# Patient Record
Sex: MALE | Race: WHITE
[De-identification: names, ages, dates, MRNs, and addresses within clinical notes are randomized per-mention and may not be internally consistent; named-entity substitution may affect disease eponyms.]

---

## 2019-12-30 ENCOUNTER — RX ONLY (RX ONLY)
Age: 44
End: 2019-12-30

## 2019-12-30 ENCOUNTER — OPTICAL OFFICE (OUTPATIENT)
Dept: URBAN - NONMETROPOLITAN AREA CLINIC 5 | Facility: CLINIC | Age: 44
Setting detail: OPHTHALMOLOGY
End: 2019-12-30
Payer: COMMERCIAL

## 2019-12-30 ENCOUNTER — DOCTOR'S OFFICE (OUTPATIENT)
Dept: URBAN - NONMETROPOLITAN AREA CLINIC 2 | Facility: CLINIC | Age: 44
Setting detail: OPHTHALMOLOGY
End: 2019-12-30
Payer: COMMERCIAL

## 2019-12-30 DIAGNOSIS — H52.13: ICD-10-CM

## 2019-12-30 DIAGNOSIS — H50.10: ICD-10-CM

## 2019-12-30 PROCEDURE — V2102 SINGL VISN SPHERE 7.12-20.00: HCPCS | Performed by: OPTOMETRIST

## 2019-12-30 PROCEDURE — V2100 LENS SPHER SINGLE PLANO 4.00: HCPCS | Performed by: OPTOMETRIST

## 2019-12-30 PROCEDURE — V2784 LENS POLYCARB OR EQUAL: HCPCS | Performed by: OPTOMETRIST

## 2019-12-30 PROCEDURE — V2020 VISION SVCS FRAMES PURCHASES: HCPCS | Performed by: OPTOMETRIST

## 2019-12-30 PROCEDURE — 92004 COMPRE OPH EXAM NEW PT 1/>: CPT | Performed by: OPTOMETRIST

## 2019-12-30 PROCEDURE — 92015 DETERMINE REFRACTIVE STATE: CPT | Performed by: OPTOMETRIST

## 2019-12-30 ASSESSMENT — SPHEQUIV_DERIVED
OD_SPHEQUIV: -2.875
OS_SPHEQUIV: -3.875
OD_SPHEQUIV: -3

## 2019-12-30 ASSESSMENT — REFRACTION_MANIFEST
OD_VA1: 20/25
OD_CYLINDER: -0.50
OU_VA: 20/20
OD_VA2: 20/25
OS_SPHERE: -3.00
OD_AXIS: 095
OD_VA3: 20/
OS_VA2: 20/20
OD_SPHERE: -2.75
OS_VA3: 20/
OS_VA1: 20/25

## 2019-12-30 ASSESSMENT — REFRACTION_CURRENTRX
OD_VPRISM_DIRECTION: SV
OD_OVR_VA: 20/
OD_SPHERE: -2.75
OS_VPRISM_DIRECTION: SV
OD_AXIS: 113
OD_CYLINDER: -0.75
OS_OVR_VA: 20/
OS_SPHERE: -3.00
OS_CYLINDER: SPH

## 2019-12-30 ASSESSMENT — CONFRONTATIONAL VISUAL FIELD TEST (CVF)
OD_FINDINGS: FULL
OS_FINDINGS: FULL

## 2019-12-30 ASSESSMENT — AXIALLENGTH_DERIVED
OS_AL: 24.4919
OD_AL: 24.0313
OD_AL: 24.082

## 2019-12-30 ASSESSMENT — REFRACTION_AUTOREFRACTION
OD_CYLINDER: -0.75
OS_CYLINDER: -0.25
OS_SPHERE: -3.75
OS_AXIS: 153
OD_AXIS: 097
OD_SPHERE: -2.50

## 2019-12-30 ASSESSMENT — KERATOMETRY
OS_K2POWER_DIOPTERS: 45.50
OD_AXISANGLE_DEGREES: 073
OD_K2POWER_DIOPTERS: 45.75
OS_K1POWER_DIOPTERS: 45.00
OD_K1POWER_DIOPTERS: 45.00
OS_AXISANGLE_DEGREES: 167

## 2019-12-30 ASSESSMENT — VISUAL ACUITY
OD_BCVA: 20/25
OS_BCVA: 20/25-2

## 2023-03-08 ENCOUNTER — OFFICE VISIT (OUTPATIENT)
Dept: URGENT CARE | Facility: CLINIC | Age: 48
End: 2023-03-08

## 2023-03-08 VITALS
TEMPERATURE: 98.6 F | OXYGEN SATURATION: 98 % | HEART RATE: 85 BPM | RESPIRATION RATE: 20 BRPM | WEIGHT: 192.4 LBS | SYSTOLIC BLOOD PRESSURE: 138 MMHG | DIASTOLIC BLOOD PRESSURE: 75 MMHG

## 2023-03-08 DIAGNOSIS — M79.89 BILATERAL SWELLING OF FEET: Primary | ICD-10-CM

## 2023-03-08 RX ORDER — CEPHALEXIN 500 MG/1
500 CAPSULE ORAL EVERY 8 HOURS SCHEDULED
Qty: 21 CAPSULE | Refills: 0 | Status: SHIPPED | OUTPATIENT
Start: 2023-03-08 | End: 2023-03-15

## 2023-03-08 NOTE — PATIENT INSTRUCTIONS
Take prescribed medications as instructed  Sure to call your family doctor tonight or as early as tomorrow morning to follow-up regarding bilateral swelling of the feet  Compression stockings  Place 1-2 pillows under your feet while resting at home for elevation and to help with swelling  Wear proper footwear  If you experience any chest pain, shortness of breath, or worsening pain in the feet/calves -go to the emergency room  Follow up with PCP in 3-5 days  Proceed to  ER if symptoms worsen  Edema   WHAT YOU NEED TO KNOW:   Edema is swelling throughout your body  Edema is usually a sign that you are retaining fluid  The swelling may be caused by heart failure or kidney, thyroid, or liver disease  It may also be caused by medicines such as antidepressants, blood pressure medicines, or hormones  Sudden swelling around the lips or face may be a sign of a severe allergic reaction  Swelling of an arm or leg may be caused by blockage of your veins  DISCHARGE INSTRUCTIONS:   Return to the emergency department if:   You have shortness of breath at rest, especially when you lie down  You cough up pink, foamy sputum  You have chest pain  Your heartbeat is fast or uneven  Call your doctor if:   The swollen area feels cold and is pale or blue in color  The swollen area feels warm, painful, and is red in color  You have increased swelling or swelling in other parts of your body  You have questions or concerns about your condition or care  Medicines:   Medicines  help to get rid of extra body fluid  Take your medicine as directed  Contact your healthcare provider if you think your medicine is not helping or if you have side effects  Tell your provider if you are allergic to any medicine  Keep a list of the medicines, vitamins, and herbs you take  Include the amounts, and when and why you take them  Bring the list or the pill bottles to follow-up visits   Carry your medicine list with you in case of an emergency  Manage edema:   Elevate  your arms or legs as directed  Raise them above the level of your heart as often as you can  This will help decrease swelling and pain  Prop them on pillows or blankets to keep them elevated comfortably  Wear pressure stockings as directed  The stockings are tight and put pressure on your legs  This helps to keep fluid from collecting in your legs or ankles  Limit your salt intake  Salt causes your body to hold water  Ask about any other changes to your diet  Stay active  Do not stand or sit for long periods of time  Ask your healthcare provider about the best exercise plan for you  Keep your skin moist  using lotion, cream, or ointment  Ask your healthcare provider what to use and how often to use it  Follow up with your doctor as directed:  Write down your questions so you remember to ask them during your visits  © Copyright [employer] 2022 Information is for End User's use only and may not be sold, redistributed or otherwise used for commercial purposes  The above information is an  only  It is not intended as medical advice for individual conditions or treatments  Talk to your doctor, nurse or pharmacist before following any medical regimen to see if it is safe and effective for you

## 2023-03-08 NOTE — PROGRESS NOTES
[facility]        NAME: Nona Galvez is a 50 y o  male  : 1975    MRN: [id number]  DATE: 2023  TIME: 4:46 PM    Assessment and Plan   Bilateral swelling of feet [M79 89]  1  Bilateral swelling of feet  cephalexin (KEFLEX) 500 mg capsule        Patient has bilateral 2+ pitting edema of the feet started 2 days ago  Swelling starts at the high ankles bilaterally and radiates down the feet  Denies any calf pain or weakness in the calf  Palpable pulses bilaterally in the dorsalis pedis and posterior tibialis  There is mild redness of the left lower leg, sending in Keflex  PT has history of Crohn's and gets infusions per his GI doctor  PT admits to pain with bearing weight  Strongly encourage patient to call his family doctor today or as early as tomorrow morning for follow-up regarding swelling in the feet  Given advice for at home remedies  Strongly encourage patient to go to the emergency room if symptoms worsen or do not improve  Patient Instructions     Take prescribed medications as instructed  Sure to call your family doctor tonight or as early as tomorrow morning to follow-up regarding bilateral swelling of the feet  Compression stockings  Place 1-2 pillows under your feet while resting at home for elevation and to help with swelling  Wear proper footwear  If you experience any chest pain, shortness of breath, or worsening pain in the feet/calves -go to the emergency room  Follow up with PCP in 3-5 days  Proceed to  ER if symptoms worsen  Chief Complaint     Chief Complaint   Patient presents with   • feet swelling     With pain  Started yesterday  History of Present Illness       27-year-old male presents with bilateral swelling of the feet that began about 2 days ago  PT states that he has had this in the past once, states it went away on its own  PT has not been taking any medications for pain    Admits to mild pain in the feet bilaterally with walking and bearing weight  He denies any pain or swelling in the calves  PT denies any recent symptoms or illnesses  Denies any fever, chills, chest pain, shortness of breath, abdominal pain, nausea, vomiting, diarrhea  Denies any recent injuries to the lower extremities  Review of Systems   Review of Systems   Constitutional: Negative  HENT: Negative  Respiratory: Negative  Cardiovascular: Negative  Gastrointestinal: Negative  Musculoskeletal: Positive for arthralgias and joint swelling  Skin: Positive for color change  Neurological: Negative  Current Medications       Current Outpatient Medications:   •  cephalexin (KEFLEX) 500 mg capsule, Take 1 capsule (500 mg total) by mouth every 8 (eight) hours for 7 days, Disp: 21 capsule, Rfl: 0    Current Allergies     Allergies as of 03/08/2023 - never reviewed   Allergen Reaction Noted   • Tetanus-diphth-acell pertussis Other (See Comments) 03/08/2023            The following portions of the patient's history were reviewed and updated as appropriate: allergies, current medications, past family history, past medical history, past social history, past surgical history and problem list      Past Medical History:   Diagnosis Date   • Crohn's disease ([facility] [[email or web address] [facility] )        No past surgical history on file  No family history on file  Medications have been verified  Objective   /75   Pulse 85   Temp 98 6 °F (37 °C)   Resp 20   Wt 87 3 kg (192 lb 6 4 oz)   SpO2 98%        Physical Exam     Physical Exam  Constitutional:       General: He is not in acute distress  Appearance: Normal appearance  HENT:      Head: Normocephalic  Eyes:      Extraocular Movements: Extraocular movements intact  Pupils: Pupils are equal, round, and reactive to light  Cardiovascular:      Rate and Rhythm: Normal rate and regular rhythm  Pulses: Normal pulses  Heart sounds: Normal heart sounds  No murmur heard      No friction rub  No gallop  Pulmonary:      Effort: Pulmonary effort is normal  No respiratory distress  Breath sounds: Normal breath sounds  No stridor  No wheezing, rhonchi or rales  Chest:      Chest wall: No tenderness  Abdominal:      General: Abdomen is flat  Bowel sounds are normal       Palpations: Abdomen is soft  Musculoskeletal:      Right ankle: Swelling present  No deformity  Normal pulse  Right Achilles Tendon: Normal       Left ankle: Swelling present  No deformity  Normal pulse  Left Achilles Tendon: Normal       Right foot: Normal capillary refill  Swelling and tenderness (mild) present  Normal pulse  Left foot: Normal capillary refill  Swelling and tenderness (mild) present  Normal pulse  Comments: There is bilateral 2+ pitting edema from the high ankle level bilaterally extending down into the feet  There is very mild erythema of the left medial high ankle region  No open wounds or drainage  Equal sensation bilaterally  Good range of motion with plantar/dorsiflexion/inversion/eversion  Dorsalis pedis and posterior tibialis pulses palpable  Equal sensation bilaterally  Skin:     General: Skin is warm and dry  Capillary Refill: Capillary refill takes less than 2 seconds  Neurological:      General: No focal deficit present  Mental Status: He is alert and oriented to person, place, and time     Psychiatric:         Mood and Affect: Mood normal          Behavior: Behavior normal

## 2023-12-05 ENCOUNTER — HOSPITAL ENCOUNTER (INPATIENT)
Facility: HOSPITAL | Age: 48
LOS: 5 days | Discharge: HOME | DRG: 245 | End: 2023-12-10
Attending: EMERGENCY MEDICINE | Admitting: FAMILY MEDICINE
Payer: COMMERCIAL

## 2023-12-05 ENCOUNTER — APPOINTMENT (EMERGENCY)
Dept: RADIOLOGY | Facility: HOSPITAL | Age: 48
DRG: 245 | End: 2023-12-05
Payer: COMMERCIAL

## 2023-12-05 ENCOUNTER — APPOINTMENT (EMERGENCY)
Dept: CT IMAGING | Facility: HOSPITAL | Age: 48
DRG: 245 | End: 2023-12-05
Payer: COMMERCIAL

## 2023-12-05 ENCOUNTER — TELEPHONE (OUTPATIENT)
Dept: OTHER | Facility: HOSPITAL | Age: 48
End: 2023-12-05

## 2023-12-05 ENCOUNTER — APPOINTMENT (INPATIENT)
Dept: RADIOLOGY | Facility: HOSPITAL | Age: 48
DRG: 245 | End: 2023-12-05
Payer: COMMERCIAL

## 2023-12-05 DIAGNOSIS — N28.89 RIGHT KIDNEY MASS: ICD-10-CM

## 2023-12-05 DIAGNOSIS — I10 ESSENTIAL (PRIMARY) HYPERTENSION: ICD-10-CM

## 2023-12-05 DIAGNOSIS — K56.609 SBO (SMALL BOWEL OBSTRUCTION) (HCC): Primary | ICD-10-CM

## 2023-12-05 DIAGNOSIS — K50.118 CROHN'S COLITIS, OTHER COMPLICATION (HCC): ICD-10-CM

## 2023-12-05 LAB
ALBUMIN SERPL BCP-MCNC: 4.5 G/DL (ref 3.5–5)
ALP SERPL-CCNC: 74 U/L (ref 34–104)
ALT SERPL W P-5'-P-CCNC: 28 U/L (ref 7–52)
ANION GAP SERPL CALCULATED.3IONS-SCNC: 11 MMOL/L
AST SERPL W P-5'-P-CCNC: 12 U/L (ref 13–39)
BASOPHILS # BLD AUTO: 0.09 THOUSANDS/ÂΜL (ref 0–0.1)
BASOPHILS NFR BLD AUTO: 1 % (ref 0–1)
BILIRUB SERPL-MCNC: 0.82 MG/DL (ref 0.2–1)
BUN SERPL-MCNC: 19 MG/DL (ref 5–25)
CALCIUM SERPL-MCNC: 9.8 MG/DL (ref 8.4–10.2)
CHLORIDE SERPL-SCNC: 99 MMOL/L (ref 96–108)
CO2 SERPL-SCNC: 28 MMOL/L (ref 21–32)
CREAT SERPL-MCNC: 1.01 MG/DL (ref 0.6–1.3)
CRP SERPL QL: 2.4 MG/L
EOSINOPHIL # BLD AUTO: 0.03 THOUSAND/ÂΜL (ref 0–0.61)
EOSINOPHIL NFR BLD AUTO: 0 % (ref 0–6)
ERYTHROCYTE [DISTWIDTH] IN BLOOD BY AUTOMATED COUNT: 15.9 % (ref 11.6–15.1)
ERYTHROCYTE [SEDIMENTATION RATE] IN BLOOD: <1 MM/HOUR (ref 0–14)
GFR SERPL CREATININE-BSD FRML MDRD: 87 ML/MIN/1.73SQ M
GLUCOSE SERPL-MCNC: 102 MG/DL (ref 65–140)
HCT VFR BLD AUTO: 47.9 % (ref 36.5–49.3)
HGB BLD-MCNC: 15.6 G/DL (ref 12–17)
IMM GRANULOCYTES # BLD AUTO: 0.37 THOUSAND/UL (ref 0–0.2)
IMM GRANULOCYTES NFR BLD AUTO: 2 % (ref 0–2)
LACTATE SERPL-SCNC: 1.6 MMOL/L (ref 0.5–2)
LACTATE SERPL-SCNC: 2.3 MMOL/L (ref 0.5–2)
LIPASE SERPL-CCNC: 13 U/L (ref 11–82)
LYMPHOCYTES # BLD AUTO: 1.29 THOUSANDS/ÂΜL (ref 0.6–4.47)
LYMPHOCYTES NFR BLD AUTO: 8 % (ref 14–44)
MCH RBC QN AUTO: 26.8 PG (ref 26.8–34.3)
MCHC RBC AUTO-ENTMCNC: 32.6 G/DL (ref 31.4–37.4)
MCV RBC AUTO: 82 FL (ref 82–98)
MONOCYTES # BLD AUTO: 0.84 THOUSAND/ÂΜL (ref 0.17–1.22)
MONOCYTES NFR BLD AUTO: 6 % (ref 4–12)
NEUTROPHILS # BLD AUTO: 12.72 THOUSANDS/ÂΜL (ref 1.85–7.62)
NEUTS SEG NFR BLD AUTO: 83 % (ref 43–75)
NRBC BLD AUTO-RTO: 0 /100 WBCS
PLATELET # BLD AUTO: 363 THOUSANDS/UL (ref 149–390)
PMV BLD AUTO: 8.9 FL (ref 8.9–12.7)
POTASSIUM SERPL-SCNC: 3.4 MMOL/L (ref 3.5–5.3)
PROT SERPL-MCNC: 6.4 G/DL (ref 6.4–8.4)
RBC # BLD AUTO: 5.82 MILLION/UL (ref 3.88–5.62)
SODIUM SERPL-SCNC: 138 MMOL/L (ref 135–147)
WBC # BLD AUTO: 15.34 THOUSAND/UL (ref 4.31–10.16)

## 2023-12-05 PROCEDURE — 85652 RBC SED RATE AUTOMATED: CPT | Performed by: EMERGENCY MEDICINE

## 2023-12-05 PROCEDURE — 96365 THER/PROPH/DIAG IV INF INIT: CPT

## 2023-12-05 PROCEDURE — 99222 1ST HOSP IP/OBS MODERATE 55: CPT | Performed by: UROLOGY

## 2023-12-05 PROCEDURE — 99284 EMERGENCY DEPT VISIT MOD MDM: CPT

## 2023-12-05 PROCEDURE — 83605 ASSAY OF LACTIC ACID: CPT | Performed by: EMERGENCY MEDICINE

## 2023-12-05 PROCEDURE — 85025 COMPLETE CBC W/AUTO DIFF WBC: CPT | Performed by: EMERGENCY MEDICINE

## 2023-12-05 PROCEDURE — 71045 X-RAY EXAM CHEST 1 VIEW: CPT

## 2023-12-05 PROCEDURE — G1004 CDSM NDSC: HCPCS

## 2023-12-05 PROCEDURE — 96376 TX/PRO/DX INJ SAME DRUG ADON: CPT

## 2023-12-05 PROCEDURE — 96361 HYDRATE IV INFUSION ADD-ON: CPT

## 2023-12-05 PROCEDURE — 86140 C-REACTIVE PROTEIN: CPT | Performed by: EMERGENCY MEDICINE

## 2023-12-05 PROCEDURE — 80053 COMPREHEN METABOLIC PANEL: CPT | Performed by: EMERGENCY MEDICINE

## 2023-12-05 PROCEDURE — 96366 THER/PROPH/DIAG IV INF ADDON: CPT

## 2023-12-05 PROCEDURE — 83605 ASSAY OF LACTIC ACID: CPT | Performed by: FAMILY MEDICINE

## 2023-12-05 PROCEDURE — 96375 TX/PRO/DX INJ NEW DRUG ADDON: CPT

## 2023-12-05 PROCEDURE — 36415 COLL VENOUS BLD VENIPUNCTURE: CPT | Performed by: EMERGENCY MEDICINE

## 2023-12-05 PROCEDURE — 99223 1ST HOSP IP/OBS HIGH 75: CPT | Performed by: SURGERY

## 2023-12-05 PROCEDURE — C9113 INJ PANTOPRAZOLE SODIUM, VIA: HCPCS | Performed by: PHYSICIAN ASSISTANT

## 2023-12-05 PROCEDURE — 83690 ASSAY OF LIPASE: CPT | Performed by: EMERGENCY MEDICINE

## 2023-12-05 PROCEDURE — 74177 CT ABD & PELVIS W/CONTRAST: CPT

## 2023-12-05 PROCEDURE — 99223 1ST HOSP IP/OBS HIGH 75: CPT | Performed by: FAMILY MEDICINE

## 2023-12-05 PROCEDURE — 99285 EMERGENCY DEPT VISIT HI MDM: CPT | Performed by: EMERGENCY MEDICINE

## 2023-12-05 RX ORDER — SODIUM CHLORIDE, SODIUM GLUCONATE, SODIUM ACETATE, POTASSIUM CHLORIDE, MAGNESIUM CHLORIDE, SODIUM PHOSPHATE, DIBASIC, AND POTASSIUM PHOSPHATE .53; .5; .37; .037; .03; .012; .00082 G/100ML; G/100ML; G/100ML; G/100ML; G/100ML; G/100ML; G/100ML
100 INJECTION, SOLUTION INTRAVENOUS CONTINUOUS
Status: DISCONTINUED | OUTPATIENT
Start: 2023-12-05 | End: 2023-12-08

## 2023-12-05 RX ORDER — ONDANSETRON 2 MG/ML
4 INJECTION INTRAMUSCULAR; INTRAVENOUS EVERY 6 HOURS PRN
Status: DISCONTINUED | OUTPATIENT
Start: 2023-12-05 | End: 2023-12-10 | Stop reason: HOSPADM

## 2023-12-05 RX ORDER — HYDROMORPHONE HCL/PF 1 MG/ML
0.5 SYRINGE (ML) INJECTION ONCE
Status: COMPLETED | OUTPATIENT
Start: 2023-12-05 | End: 2023-12-05

## 2023-12-05 RX ORDER — OMEPRAZOLE 40 MG/1
40 CAPSULE, DELAYED RELEASE ORAL DAILY
COMMUNITY
Start: 2023-11-29

## 2023-12-05 RX ORDER — SULFAMETHOXAZOLE AND TRIMETHOPRIM 400; 80 MG/1; MG/1
1 TABLET ORAL DAILY
COMMUNITY
Start: 2023-11-28 | End: 2023-12-10

## 2023-12-05 RX ORDER — UPADACITINIB 45 MG/1
45 TABLET, EXTENDED RELEASE ORAL DAILY
Status: ON HOLD | COMMUNITY
Start: 2023-10-04 | End: 2023-12-10 | Stop reason: SDUPTHER

## 2023-12-05 RX ORDER — POTASSIUM CHLORIDE 14.9 MG/ML
20 INJECTION INTRAVENOUS ONCE
Status: COMPLETED | OUTPATIENT
Start: 2023-12-05 | End: 2023-12-05

## 2023-12-05 RX ORDER — ONDANSETRON 2 MG/ML
4 INJECTION INTRAMUSCULAR; INTRAVENOUS ONCE
Status: COMPLETED | OUTPATIENT
Start: 2023-12-05 | End: 2023-12-05

## 2023-12-05 RX ORDER — MORPHINE SULFATE 4 MG/ML
4 INJECTION, SOLUTION INTRAMUSCULAR; INTRAVENOUS EVERY 4 HOURS PRN
Status: DISCONTINUED | OUTPATIENT
Start: 2023-12-05 | End: 2023-12-09

## 2023-12-05 RX ORDER — AMLODIPINE BESYLATE 2.5 MG/1
2.5 TABLET ORAL DAILY
COMMUNITY
Start: 2023-09-14

## 2023-12-05 RX ORDER — HYDROMORPHONE HCL/PF 1 MG/ML
0.5 SYRINGE (ML) INJECTION
Status: DISCONTINUED | OUTPATIENT
Start: 2023-12-05 | End: 2023-12-10 | Stop reason: HOSPADM

## 2023-12-05 RX ORDER — PANTOPRAZOLE SODIUM 40 MG/10ML
40 INJECTION, POWDER, LYOPHILIZED, FOR SOLUTION INTRAVENOUS
Status: DISCONTINUED | OUTPATIENT
Start: 2023-12-05 | End: 2023-12-10 | Stop reason: HOSPADM

## 2023-12-05 RX ORDER — FLUTICASONE PROPIONATE 50 MCG
1 SPRAY, SUSPENSION (ML) NASAL DAILY
COMMUNITY
Start: 2023-09-14 | End: 2023-12-10

## 2023-12-05 RX ORDER — PREDNISONE 10 MG/1
TABLET ORAL DAILY
Status: ON HOLD | COMMUNITY
Start: 2023-11-28 | End: 2023-12-10 | Stop reason: SDUPTHER

## 2023-12-05 RX ORDER — HYDROMORPHONE HCL/PF 1 MG/ML
1 SYRINGE (ML) INJECTION ONCE
Status: COMPLETED | OUTPATIENT
Start: 2023-12-05 | End: 2023-12-05

## 2023-12-05 RX ORDER — LABETALOL HYDROCHLORIDE 5 MG/ML
10 INJECTION, SOLUTION INTRAVENOUS EVERY 4 HOURS PRN
Status: DISCONTINUED | OUTPATIENT
Start: 2023-12-05 | End: 2023-12-10 | Stop reason: HOSPADM

## 2023-12-05 RX ORDER — ENOXAPARIN SODIUM 100 MG/ML
40 INJECTION SUBCUTANEOUS DAILY
Status: DISCONTINUED | OUTPATIENT
Start: 2023-12-05 | End: 2023-12-10 | Stop reason: HOSPADM

## 2023-12-05 RX ADMIN — MORPHINE SULFATE 4 MG: 4 INJECTION, SOLUTION INTRAMUSCULAR; INTRAVENOUS at 15:38

## 2023-12-05 RX ADMIN — ONDANSETRON 4 MG: 2 INJECTION INTRAMUSCULAR; INTRAVENOUS at 06:17

## 2023-12-05 RX ADMIN — SODIUM CHLORIDE, SODIUM GLUCONATE, SODIUM ACETATE, POTASSIUM CHLORIDE, MAGNESIUM CHLORIDE, SODIUM PHOSPHATE, DIBASIC, AND POTASSIUM PHOSPHATE 100 ML/HR: .53; .5; .37; .037; .03; .012; .00082 INJECTION, SOLUTION INTRAVENOUS at 13:02

## 2023-12-05 RX ADMIN — ENOXAPARIN SODIUM 40 MG: 40 INJECTION SUBCUTANEOUS at 12:54

## 2023-12-05 RX ADMIN — LABETALOL HYDROCHLORIDE 10 MG: 5 INJECTION, SOLUTION INTRAVENOUS at 12:56

## 2023-12-05 RX ADMIN — POTASSIUM CHLORIDE 20 MEQ: 14.9 INJECTION, SOLUTION INTRAVENOUS at 07:17

## 2023-12-05 RX ADMIN — IOHEXOL 100 ML: 350 INJECTION, SOLUTION INTRAVENOUS at 08:15

## 2023-12-05 RX ADMIN — HYDROMORPHONE HYDROCHLORIDE 0.5 MG: 1 INJECTION, SOLUTION INTRAMUSCULAR; INTRAVENOUS; SUBCUTANEOUS at 20:07

## 2023-12-05 RX ADMIN — HYDROMORPHONE HYDROCHLORIDE 0.5 MG: 1 INJECTION, SOLUTION INTRAMUSCULAR; INTRAVENOUS; SUBCUTANEOUS at 06:18

## 2023-12-05 RX ADMIN — HYDROMORPHONE HYDROCHLORIDE 1 MG: 1 INJECTION, SOLUTION INTRAMUSCULAR; INTRAVENOUS; SUBCUTANEOUS at 09:07

## 2023-12-05 RX ADMIN — PANTOPRAZOLE SODIUM 40 MG: 40 INJECTION, POWDER, LYOPHILIZED, FOR SOLUTION INTRAVENOUS at 17:15

## 2023-12-05 RX ADMIN — SODIUM CHLORIDE 1000 ML: 0.9 INJECTION, SOLUTION INTRAVENOUS at 06:16

## 2023-12-05 NOTE — ASSESSMENT & PLAN NOTE
CT abdomen and pelvis 12/05/2023: Heterogeneously enhancing mass within the right kidney most concerning for renal cell carcinoma.    Urology consult appreciated

## 2023-12-05 NOTE — ASSESSMENT & PLAN NOTE
PMH of Crohn's disease, complicated by fibrostenotic region, s/p R hemicolectomy w anastomosis 2012,   H/o recent admission for Crohn's flare,   - CT scan 11/22/2023: possible SBO, enteritis, colitis. - Colonoscopy 11/24/2023: ulcer in colon, regions of severe colitis. - Was discharged on prednisone taper, Rinvoq 45 mg daily, states he is compliant.     GI consult appreciated

## 2023-12-05 NOTE — PLAN OF CARE
Problem: PAIN - ADULT  Goal: Verbalizes/displays adequate comfort level or baseline comfort level  Description: Interventions:  - Encourage patient to monitor pain and request assistance  - Assess pain using appropriate pain scale  - Administer analgesics based on type and severity of pain and evaluate response  - Implement non-pharmacological measures as appropriate and evaluate response  - Consider cultural and social influences on pain and pain management  - Notify physician/advanced practitioner if interventions unsuccessful or patient reports new pain  Outcome: Progressing     Problem: INFECTION - ADULT  Goal: Absence or prevention of progression during hospitalization  Description: INTERVENTIONS:  - Assess and monitor for signs and symptoms of infection  - Monitor lab/diagnostic results  - Monitor all insertion sites, i.e. indwelling lines, tubes, and drains  - Monitor endotracheal if appropriate and nasal secretions for changes in amount and color  - [place] appropriate cooling/warming therapies per order  - Administer medications as ordered  - Instruct and encourage patient and family to use good hand hygiene technique  - Identify and instruct in appropriate isolation precautions for identified infection/condition  Outcome: Progressing  Goal: Absence of fever/infection during neutropenic period  Description: INTERVENTIONS:  - Monitor WBC    Outcome: Progressing     Problem: SAFETY ADULT  Goal: Patient will remain free of falls  Description: INTERVENTIONS:  - Educate patient/family on patient safety including physical limitations  - Instruct patient to call for assistance with activity   - Consult OT/PT to assist with strengthening/mobility   - Keep Call bell within reach  - Keep bed low and locked with side rails adjusted as appropriate  - Keep care items and personal belongings within reach  - Initiate and maintain comfort rounds  - Make Fall Risk Sign visible to staff  - Offer Toileting every 2 Hours, in advance of need  - Initiate/Maintain fall alarm  - Obtain necessary fall risk management equipment: non-skid footwear  - Apply yellow socks and bracelet for high fall risk patients  - Consider moving patient to room near nurses station  Outcome: Progressing  Goal: Maintain or return to baseline ADL function  Description: INTERVENTIONS:  -  Assess patient's ability to carry out ADLs; assess patient's baseline for ADL function and identify physical deficits which impact ability to perform ADLs (bathing, care of mouth/teeth, toileting, grooming, dressing, etc.)  - Assess/evaluate cause of self-care deficits   - Assess range of motion  - Assess patient's mobility; develop plan if impaired  - Assess patient's need for assistive devices and provide as appropriate  - Encourage maximum independence but intervene and supervise when necessary  - Involve family in performance of ADLs  - Assess for home care needs following discharge   - Consider OT consult to assist with ADL evaluation and planning for discharge  - Provide patient education as appropriate  Outcome: Progressing  Goal: Maintains/Returns to pre admission functional level  Description: INTERVENTIONS:  - Perform AM-PAC 6 Click Basic Mobility/ Daily Activity assessment daily.  - Set and communicate daily mobility goal to care team and patient/family/caregiver. - Collaborate with rehabilitation services on mobility goals if consulted  - Perform Range of Motion 3 times a day. - Reposition patient every 2 hours.   - Dangle patient 3 times a day  - Stand patient 3 times a day  - Ambulate patient 3 times a day  - Out of bed to chair 3 times a day   - Out of bed for meals 3 times a day  - Out of bed for toileting  - Record patient progress and toleration of activity level   Outcome: Progressing     Problem: DISCHARGE PLANNING  Goal: Discharge to home or other facility with appropriate resources  Description: INTERVENTIONS:  - Identify barriers to discharge w/patient and caregiver  - Arrange for needed discharge resources and transportation as appropriate  - Identify discharge learning needs (meds, wound care, etc.)  - Arrange for interpretive services to assist at discharge as needed  - Refer to Case Management Department for coordinating discharge planning if the patient needs post-hospital services based on physician/advanced practitioner order or complex needs related to functional status, cognitive ability, or social support system  Outcome: Progressing     Problem: Knowledge Deficit  Goal: Patient/family/caregiver demonstrates understanding of disease process, treatment plan, medications, and discharge instructions  Description: Complete learning assessment and assess knowledge base.   Interventions:  - Provide teaching at level of understanding  - Provide teaching via preferred learning methods  Outcome: Progressing     Problem: GASTROINTESTINAL - ADULT  Goal: Minimal or absence of nausea and/or vomiting  Description: INTERVENTIONS:  - Administer IV fluids if ordered to ensure adequate hydration  - Maintain NPO status until nausea and vomiting are resolved  - Nasogastric tube if ordered  - Administer ordered antiemetic medications as needed  - Provide nonpharmacologic comfort measures as appropriate  - Advance diet as tolerated, if ordered  - Consider nutrition services referral to assist patient with adequate nutrition and appropriate food choices  Outcome: Progressing  Goal: Maintains or returns to baseline bowel function  Description: INTERVENTIONS:  - Assess bowel function  - Encourage oral fluids to ensure adequate hydration  - Administer IV fluids if ordered to ensure adequate hydration  - Administer ordered medications as needed  - Encourage mobilization and activity  - Consider nutritional services referral to assist patient with adequate nutrition and appropriate food choices  Outcome: Progressing  Goal: Maintains adequate nutritional intake  Description: INTERVENTIONS:  - Monitor percentage of each meal consumed  - Identify factors contributing to decreased intake, treat as appropriate  - Assist with meals as needed  - Monitor I&O, weight, and lab values if indicated  - Obtain nutrition services referral as needed  Outcome: Progressing  Goal: Oral mucous membranes remain intact  Description: INTERVENTIONS  - Assess oral mucosa and hygiene practices  - Implement preventative oral hygiene regimen  - Implement oral medicated treatments as ordered  - Initiate Nutrition services referral as needed  Outcome: Progressing     Problem: GENITOURINARY - ADULT  Goal: Maintains or returns to baseline urinary function  Description: INTERVENTIONS:  - Assess urinary function  - Encourage oral fluids to ensure adequate hydration if ordered  - Administer IV fluids as ordered to ensure adequate hydration  - Administer ordered medications as needed  - Offer frequent toileting  - Follow urinary retention protocol if ordered  Outcome: Progressing  Goal: Absence of urinary retention  Description: INTERVENTIONS:  - Assess patient’s ability to void and empty bladder  - Monitor I/O  - Bladder scan as needed  - Discuss with physician/AP medications to alleviate retention as needed  - Discuss catheterization for long term situations as appropriate  Outcome: Progressing     Problem: METABOLIC, FLUID AND ELECTROLYTES - ADULT  Goal: Electrolytes maintained within normal limits  Description: INTERVENTIONS:  - Monitor labs and assess patient for signs and symptoms of electrolyte imbalances  - Administer electrolyte replacement as ordered  - Monitor response to electrolyte replacements, including repeat lab results as appropriate  - Instruct patient on fluid and nutrition as appropriate  Outcome: Progressing  Goal: Fluid balance maintained  Description: INTERVENTIONS:  - Monitor labs   - Monitor I/O and WT  - Instruct patient on fluid and nutrition as appropriate  - Assess for signs & symptoms of volume excess or deficit  Outcome: Progressing  Goal: Glucose maintained within target range  Description: INTERVENTIONS:  - Monitor Blood Glucose as ordered  - Assess for signs and symptoms of hyperglycemia and hypoglycemia  - Administer ordered medications to maintain glucose within target range  - Assess nutritional intake and initiate nutrition service referral as needed  Outcome: Progressing     Problem: HEMATOLOGIC - ADULT  Goal: Maintains hematologic stability  Description: INTERVENTIONS  - Assess for signs and symptoms of bleeding or hemorrhage  - Monitor labs  - Administer supportive blood products/factors as ordered and appropriate  Outcome: Progressing     Problem: MUSCULOSKELETAL - ADULT  Goal: Maintain or return mobility to safest level of function  Description: INTERVENTIONS:  - Assess patient's ability to carry out ADLs; assess patient's baseline for ADL function and identify physical deficits which impact ability to perform ADLs (bathing, care of mouth/teeth, toileting, grooming, dressing, etc.)  - Assess/evaluate cause of self-care deficits   - Assess range of motion  - Assess patient's mobility  - Assess patient's need for assistive devices and provide as appropriate  - Encourage maximum independence but intervene and supervise when necessary  - Involve family in performance of ADLs  - Assess for home care needs following discharge   - Consider OT consult to assist with ADL evaluation and planning for discharge  - Provide patient education as appropriate  Outcome: Progressing  Goal: Maintain proper alignment of affected body part  Description: INTERVENTIONS:  - Support, maintain and protect limb and body alignment  - Provide patient/ family with appropriate education  Outcome: Progressing

## 2023-12-05 NOTE — CONSULTS
Consultation - Urology  Mima Edwards 50 y.o. male MRN: [id number]  Unit/Bed#: RM02 Encounter: [id number]    ASSESSMENT  49 y/o M w/ PMH  hypertension, Crohn's disease (s/p 2 bowel resections), and vitamin D deficiency who presents with abdominal pain and nausea/vomiting. CTAP revealing SBO. Urology was consulted for evaluation of right kidney mass. Right kidney mass  -Incidental finding of a 2.8 cm heterogeneously enhancing right kidney mass suspicious for renal cell carcinoma on CT AP with contrast.    -Patient reports known history of this. Says he had follow-up renal ultrasounds to monitor. He said previously it was read as a benign cyst.  He said he was aware that the read had changed but had not not followed up urology.  -Per chart review was last seen by urology for a telemedicine visit in May 2020 by Sydney Plascencia MD of [facility]. At that time he was being followed for 2 lesions 1 of 2.2 cm and 1 of 1.9 cm in the right kidney which has been stable for many years. Patient opted for active surveillance with repeat imaging he was told to repeat imaging in 2 years and return for follow-up however patient was lost to follow-up.  -Per chart this was found on imaging as early as 2016  -Denies any urological symptoms, no irritative or obstructive voiding symptoms or hematuria. Denies back pain. Endorses abdominal pain however likely related to his current presentation of small bowel obstruction and Crohn's flare. Denies unintentional weight loss. -Kidney function is stable, no INES  -No signs of metastases on CT imaging    PLAN:  -Discussed with on-call urology AP Stepan Cisneros. Dr. Gwendolyn Terry is the on-call urologist.  -No urgent management is necessary, but he should have close outpatient follow up for discussion of surgical management options.        SUBJECTIVE:  Chief Complaint:    HPI: Mima Edwards is a 50y.o. year old male with PMH hypertension, Crohn's disease, and vitamin D deficiency who presents with abdominal pain, nausea, vomiting. Upon work-up in the ED was noted to have signs and symptoms and diagnostic findings correlating with small bowel obstruction. Incidental finding on CT scan revealed a 2.8 cm right kidney mass suspicious for renal cell carcinoma. Patient denies any urological symptoms. No issues voiding. No hematuria. Denies back pain. Denies weight loss. Has abdominal pain and nausea/vomiting likely related to his SBO. Review of Systems:  Review of Systems   Constitutional:  Negative for appetite change, chills, fever and unexpected weight change. HENT: Negative. Eyes: Negative. Respiratory: Negative. Gastrointestinal:  Positive for abdominal distention, abdominal pain, nausea and vomiting. Genitourinary:  Negative for decreased urine volume, difficulty urinating, dysuria, flank pain, frequency, hematuria and urgency. Musculoskeletal: Negative. Skin: Negative. Neurological: Negative. Hematological: Negative. Psychiatric/Behavioral: Negative. Previous History:  Historical Information   Past Medical History:   Diagnosis Date    Crohn's disease [facility])      Past Surgical History:   Procedure Laterality Date    COLECTOMY      right hemicolectomy? 2012     Social History   Social History     Substance and Sexual Activity   Alcohol Use Never     Social History     Substance and Sexual Activity   Drug Use Never     Social History     Tobacco Use   Smoking Status Never   Smokeless Tobacco Never     Family History: non-contributory    Meds/Allergies   Home meds:   Prior to Admission medications    Medication Sig Start Date End Date Taking?  Authorizing Provider   amLODIPine (NORVASC) 2.5 mg tablet Take 2.5 mg by mouth daily 9/14/23  Yes Historical Provider, MD   fluticasone (FLONASE) 50 mcg/act nasal spray 1 spray into each nostril daily 9/14/23  Yes Historical Provider, MD   omeprazole (PriLOSEC) 40 MG capsule Take 40 mg by mouth daily 11/29/23 Yes Historical Provider, MD   predniSONE 10 mg tablet Take by mouth daily 11/28/23 1/23/24 Yes Historical Provider, MD   sulfamethoxazole-trimethoprim (BACTRIM) 400-80 mg per tablet Take 1 tablet by mouth daily 11/28/23 1/23/24 Yes Historical Provider, MD   Upadacitinib ER (Rinvoq) 45 MG TB24 Take 45 mg by mouth daily 10/4/23  Yes Historical Provider, MD     Allergies: Allergies   Allergen Reactions    Tetanus-Diphth-Acell Pertussis Other (See Comments)     Swelling, fever and redness at injection site           OBJECTIVE:   Vitals: Blood pressure (!) 178/106, pulse 60, temperature 97.8 °F (36.6 °C), temperature source Temporal, resp. rate 17, height 6' 1" (1.854 m), weight 86.2 kg (190 lb), SpO2 95 %. Body mass index is 25.07 kg/m².     I/Os:  I/O         12/03 0701 12/04 0700 12/04 0701 12/05 0700 12/05 0701 12/06 0700    Emesis/NG output   325    Total Output   325    Net   -325                   Lines/Tubes:  Invasive Devices       Peripheral Intravenous Line  Duration             Peripheral IV 12/05/23 Right Antecubital <1 day              Drain  Duration             NG/OG/Enteral Tube Nasogastric 14 Fr Right nare <1 day                    Current Inpatient Medications:  Scheduled Meds:  Current Facility-Administered Medications   Medication Dose Route Frequency Provider Last Rate    enoxaparin  40 mg Subcutaneous Daily Galilea Pope MD      HYDROmorphone  0.5 mg Intravenous Q3H PRN Toño John MD      iohexol  50 mL Oral Once in imaging Toño John MD      labetalol  10 mg Intravenous Q4H PRN Toño John MD      morphine injection  2 mg Intravenous Q4H PRN Galilea Pope MD      Or    morphine injection  4 mg Intravenous Q4H PRN Toño John MD      multi-electrolyte  100 mL/hr Intravenous Continuous Galilea Pope  mL/hr (12/05/23 1302)    ondansetron  4 mg Intravenous Q6H PRN Toño John MD      pantoprazole  40 mg Intravenous Q24H [facility] Mariaa Mcclellan PA-C       Continuous Infusions:multi-electrolyte, 100 mL/hr, Last Rate: 100 mL/hr (12/05/23 1302)      PRN Meds:    HYDROmorphone    iohexol    labetalol    morphine injection **OR** morphine injection    ondansetron    Diagnostics:  CBC:   Results from last 7 days   Lab Units 12/05/23  0613   WBC Thousand/uL 15.34*   HEMOGLOBIN g/dL 15.6   HEMATOCRIT % 47.9   PLATELETS Thousands/uL 363     BMP/CMP:  Results from last 7 days   Lab Units 12/05/23  0613   POTASSIUM mmol/L 3.4*   CHLORIDE mmol/L 99   CO2 mmol/L 28   BUN mg/dL 19   CREATININE mg/dL 1.01   CALCIUM mg/dL 9.8     Coags:     Lipid panel:     HgbA1c: No results found for: "HGBA1C"  Urinalysis: No results found for: "COLORU", "CLARITYU", "SPECGRAV", "PHUR", "LEUKOCYTESUR", "NITRITE", "PROTEINUA", "GLUCOSEU", "Jovana Sharps", "BILIRUBINUR", "BLOODU",   Urine Culture: No results found for: "URINECX"  Wound Culure: No results found for: "WOUNDCULT"  Blood Culture: No results found for: "Anjum Mtz"    Imaging Studies: I have personally reviewed pertinent reports. CT abdomen pelvis with contrast    Result Date: 12/5/2023  Impression: Distended loops of small bowel compatible with small bowel obstruction with a transition point likely within the central abdomen just to the right of midline. Suspected prior partial colectomy. Small ascites in the abdomen and mesentery. Heterogeneously enhancing mass within the right kidney most concerning for renal cell carcinoma. The study was marked in [facility] for immediate notification. Workstation performed: [id number]      EKG, Pathology, and Other Studies: I have personally reviewed pertinent reports.     VTE Prophylaxis: Sequential compression device (Venodyne)     PHYSICAL EXAM:  GA: Pt is in NAD, appears nontoxic  Head: Normocephalic, atraumatic  Eyes: EOMI  Ears: Gross hearing intact  Cardio/Vas: RRR, normal S1/S2, no murmur noted  Pulm: normal lung effort on RA, anterior lung fields CTA  Abd: Abdomen soft, mildly distended, tympanic to percussion, tender to palpation slightly right to the midline of the upper abdomen, prior exploratory laparotomy scar noted, no masses palpated  Extr: No LE edema, no calf tenderness  Skin: Warm, dry.  No cyanosis, pallor, or rubor  MSK: No gross motor deficits  Neuro: Speech clear, no gross neurological deficits   Psych: Appropriate mood and affect      ADITYA Fraga  12/5/2023

## 2023-12-05 NOTE — TELEPHONE ENCOUNTER
Patient presented to ED and incidentally found to have renal mass. CT: There is a heterogeneously enhancing mass measuring approximately 2.8 cm within the right kidney medially highly concerning for renal cell carcinoma. There are right renal cysts. There is a tiny cyst in the lower pole of the left kidney. No hydronephrosis or hydroureter. Please arrange outpatient follow up upon discharge for further work up.

## 2023-12-05 NOTE — ASSESSMENT & PLAN NOTE
Hx of SBO secondary to Crohn' disesase  PMH of Crohn's disease, complicated by fibrostenotic region, s/p R hemicolectomy w anastomosis 2012,     Presented to the ER for worsening abdominal pain, nausea, vomiting since last night after eating. In ER today:  IVF, pain ctrl, NPO, NG tube    CT scan 11/05/2023: Distended loops of small bowel compatible with small bowel obstruction. Prior partial colectomy. Small ascites in the abdomen and mesentery.     Per surgery - "No acute surgical intervention warranted at this time"    Afebrile, V/S stable, hypertensive SBP 170s  Mild hypokalemia, 3.4  Lactic acidosis, 2.3  Leukocytosis, WBC 16.34  Abdomen:soft, distended, tympany, tender to palpation, no peritoneal signs    Plan:  IVF  Pain ctrl  NG tube, continue suction for now  NPO  CBC w diff  CMP  Procalcitonin  GI consult appreciated      NG tube, suction  IVF  Analgesics PRN  Antiemetics PRN   IV Protonix  Monitor for bowel function, I/Os  GI consult appreciated  Urology consult appreciated  Serial abdom exam  Address hypokalemia,  E-lytes as needed

## 2023-12-05 NOTE — ED PROVIDER NOTES
History  Chief Complaint   Patient presents with    Abdominal Pain     Ems arrival, lower abdominal cramping x 1 day, hx of chron's disease     55-year-old male with a past medical history of Crohn's disease, complicated by fibrostenotic region status post right hemicolectomy with anastomosis in , recent admission to [facility] for several days for Crohn's flare with CT scan showing possible SBO and enteritis, colitis, discharged after colonoscopy showing evidence suggestive of ulcer in colon and regions of severe colitis, who now presents for worsening abdominal pain that abruptly worsened tonight. Upon discharge, the patient was started on prednisone taper, he is on Rinvoq 45 mg daily and reports compliance with this. He reports that the discomfort has worsened since last night after eating, he has had several episodes of nausea and vomiting, nonbloody. States that he had his last bowel movement yesterday and states that it was normal, no diarrhea or constipation prior to this. Denies any fevers or chills. Reports that his abdominal pain is severe, mostly over the lower abdomen. No radiation to the back. Review of systems otherwise negative. Prior to Admission Medications   Prescriptions Last Dose Informant Patient Reported? Taking?    Upadacitinib ER (Rinvoq) 45 MG   Yes Yes   Sig: Take 45 mg by mouth daily   amLODIPine (NORVASC) 2.5 mg tablet 2023  Yes Yes   Sig: Take 2.5 mg by mouth daily   fluticasone (FLONASE) 50 mcg/act nasal spray More than a month  Yes No   Si spray into each nostril daily   omeprazole (PriLOSEC) 40 MG capsule 2023  Yes Yes   Sig: Take 40 mg by mouth daily   predniSONE 10 mg tablet 2023  Yes Yes   Sig: Take by mouth daily   sulfamethoxazole-trimethoprim (BACTRIM) 400-80 mg per tablet 2023  Yes Yes   Sig: Take 1 tablet by mouth daily      Facility-Administered Medications: None       Past Medical History:   Diagnosis Date    Crohn's disease [facility])        Past Surgical History:   Procedure Laterality Date    COLECTOMY      right hemicolectomy? 2012       History reviewed. No pertinent family history. I have reviewed and agree with the history as documented. E-Cigarette/Vaping    E-Cigarette Use Never User      E-Cigarette/Vaping Substances     Social History     Tobacco Use    Smoking status: Never    Smokeless tobacco: Never   Vaping Use    Vaping Use: Never used   Substance Use Topics    Alcohol use: Never    Drug use: Never       Review of Systems   Constitutional:  Negative for chills, diaphoresis, fatigue and fever. HENT:  Negative for congestion and sore throat. Eyes:  Negative for visual disturbance. Respiratory:  Negative for cough, chest tightness and shortness of breath. Cardiovascular:  Negative for chest pain, palpitations and leg swelling. Gastrointestinal:  Positive for abdominal pain, nausea and vomiting. Negative for abdominal distention, constipation and diarrhea. Genitourinary:  Negative for difficulty urinating and dysuria. Musculoskeletal:  Negative for arthralgias and myalgias. Skin:  Negative for rash. Neurological:  Negative for dizziness, weakness, light-headedness, numbness and headaches. Psychiatric/Behavioral:  Negative for agitation, behavioral problems and confusion. The patient is not nervous/anxious. All other systems reviewed and are negative. Physical Exam  Physical Exam  Constitutional:       Appearance: He is well-developed. HENT:      Head: Normocephalic and atraumatic. Cardiovascular:      Rate and Rhythm: Normal rate and regular rhythm. Heart sounds: Normal heart sounds. No murmur heard. Pulmonary:      Effort: Pulmonary effort is normal. No respiratory distress. Breath sounds: Normal breath sounds. Abdominal:      General: Bowel sounds are normal. There is distension. Palpations: Abdomen is soft. Tenderness:  There is abdominal tenderness in the right lower quadrant, suprapubic area and left lower quadrant. There is no right CVA tenderness, left CVA tenderness, guarding or rebound. Musculoskeletal:         General: No deformity. Skin:     General: Skin is warm. Findings: No rash. Neurological:      Mental Status: He is alert and oriented to person, place, and time. Psychiatric:         Behavior: Behavior normal.         Thought Content:  Thought content normal.         Judgment: Judgment normal.         Vital Signs  ED Triage Vitals [12/05/23 0558]   Temperature Pulse Respirations Blood Pressure SpO2   97.8 °F (36.6 °C) 61 18 (!) 202/113 99 %      Temp Source Heart Rate Source Patient Position - Orthostatic VS BP Location FiO2 (%)   Temporal Monitor Lying Left arm --      Pain Score       10 - Worst Possible Pain           Vitals:    12/05/23 1301 12/05/23 1628 12/05/23 1629 12/05/23 2020   BP: 142/89 (!) 161/106 (!) 161/106 128/89   Pulse: 69   72   Patient Position - Orthostatic VS:             Visual Acuity      ED Medications  Medications   iohexol (OMNIPAQUE) 240 MG/ML solution 50 mL (has no administration in time range)   multi-electrolyte (PLASMALYTE-A/ISOLYTE-S PH 7.4) IV solution (100 mL/hr Intravenous New Bag 12/5/23 1302)   ondansetron (ZOFRAN) injection 4 mg (has no administration in time range)   enoxaparin (LOVENOX) subcutaneous injection 40 mg (40 mg Subcutaneous Given 12/5/23 1254)   labetalol (NORMODYNE) injection 10 mg (10 mg Intravenous Given 12/5/23 1256)   morphine injection 2 mg ( Intravenous See Alternative 12/5/23 1538)     Or   morphine injection 4 mg (4 mg Intravenous Given 12/5/23 1538)   HYDROmorphone (DILAUDID) injection 0.5 mg (0.5 mg Intravenous Given 12/5/23 2007)   pantoprazole (PROTONIX) injection 40 mg (40 mg Intravenous Given 12/5/23 1715)   HYDROmorphone (DILAUDID) injection 0.5 mg (0.5 mg Intravenous Given 12/5/23 0618)   ondansetron (ZOFRAN) injection 4 mg (4 mg Intravenous Given 12/5/23 0617)   sodium chloride 0.9 % bolus 1,000 mL (0 mL Intravenous Stopped 12/5/23 0716)   potassium chloride 20 mEq IVPB (premix) (0 mEq Intravenous Stopped 12/5/23 1156)   iohexol (OMNIPAQUE) 350 MG/ML injection (MULTI-DOSE) 100 mL (100 mL Intravenous Given 12/5/23 0815)   HYDROmorphone (DILAUDID) injection 1 mg (1 mg Intravenous Given 12/5/23 0907)       Diagnostic Studies  Results Reviewed       Procedure Component Value Units Date/Time    Lactic acid 2 Hours [[id number]]  (Normal) Collected: 12/05/23 1317    Lab Status: Final result Specimen: Blood from Arm, Right Updated: 12/05/23 1338     LACTIC ACID 1.6 mmol/L     Narrative:      Result may be elevated if tourniquet was used during collection. Sedimentation rate, automated [[id number]]  (Normal) Collected: 12/05/23 0613    Lab Status: Final result Specimen: Blood from Arm, Right Updated: 12/05/23 0653     Sed Rate <1 mm/hour     Lactic acid, plasma (w/reflex if result > 2.0) [[id number]]  (Abnormal) Collected: 12/05/23 0613    Lab Status: Final result Specimen: Blood from Arm, Right Updated: 12/05/23 0643     LACTIC ACID 2.3 mmol/L     Narrative:      Result may be elevated if tourniquet was used during collection.     Comprehensive metabolic panel [[id number]]  (Abnormal) Collected: 12/05/23 0613    Lab Status: Final result Specimen: Blood from Arm, Right Updated: 12/05/23 0641     Sodium 138 mmol/L      Potassium 3.4 mmol/L      Chloride 99 mmol/L      CO2 28 mmol/L      ANION GAP 11 mmol/L      BUN 19 mg/dL      Creatinine 1.01 mg/dL      Glucose 102 mg/dL      Calcium 9.8 mg/dL      AST 12 U/L      ALT 28 U/L      Alkaline Phosphatase 74 U/L      Total Protein 6.4 g/dL      Albumin 4.5 g/dL      Total Bilirubin 0.82 mg/dL      eGFR 87 ml/min/1.73sq m     Narrative:      [place] guidelines for Chronic Kidney Disease (CKD):     Stage 1 with normal or high GFR (GFR > 90 mL/min/1.73 square meters)    Stage 2 Mild CKD (GFR = 60-89 mL/min/1.73 square meters)    Stage 3A Moderate CKD (GFR = 45-59 mL/min/1.73 square meters)    Stage 3B Moderate CKD (GFR = 30-44 mL/min/1.73 square meters)    Stage 4 Severe CKD (GFR = 15-29 mL/min/1.73 square meters)    Stage 5 End Stage CKD (GFR <15 mL/min/1.73 square meters)  Note: GFR calculation is accurate only with a steady state creatinine    Lipase [[id number]]  (Normal) Collected: 12/05/23 0613    Lab Status: Final result Specimen: Blood from Arm, Right Updated: 12/05/23 0641     Lipase 13 u/L     C-reactive protein [[id number]]  (Normal) Collected: 12/05/23 0613    Lab Status: Final result Specimen: Blood from Arm, Right Updated: 12/05/23 0641     CRP 2.4 mg/L     CBC and differential [[id number]]  (Abnormal) Collected: 12/05/23 0613    Lab Status: Final result Specimen: Blood from Arm, Right Updated: 12/05/23 0619     WBC 15.34 Thousand/uL      RBC 5.82 Million/uL      Hemoglobin 15.6 g/dL      Hematocrit 47.9 %      MCV 82 fL      MCH 26.8 pg      MCHC 32.6 g/dL      RDW 15.9 %      MPV 8.9 fL      Platelets 777 Thousands/uL      nRBC 0 /100 WBCs      Neutrophils Relative 83 %      Immat GRANS % 2 %      Lymphocytes Relative 8 %      Monocytes Relative 6 %      Eosinophils Relative 0 %      Basophils Relative 1 %      Neutrophils Absolute 12.72 Thousands/µL      Immature Grans Absolute 0.37 Thousand/uL      Lymphocytes Absolute 1.29 Thousands/µL      Monocytes Absolute 0.84 Thousand/µL      Eosinophils Absolute 0.03 Thousand/µL      Basophils Absolute 0.09 Thousands/µL     UA w Reflex to Microscopic w Reflex to Culture [[id number]]     Lab Status: No result Specimen: Urine                    CT abdomen pelvis with contrast   Final Result by Odette Lama MD (12/05 0831)      Distended loops of small bowel compatible with small bowel obstruction with a transition point likely within the central abdomen just to the right of midline. Suspected prior partial colectomy. Small ascites in the abdomen and mesentery. Heterogeneously enhancing mass within the right kidney most concerning for renal cell carcinoma. The study was marked in [facility] for immediate notification. Workstation performed: [id number]         XR chest 1 view portable   Final Result by Trudi Louis MD (12/05 1538)      No acute cardiopulmonary disease. Workstation performed: [id number]         XR chest 1 view portable    (Results Pending)              Procedures  Procedures         ED Course  ED Course as of 12/05/23 2153   Tue Dec 05, 2023   0624 WBC(!): 15.34  Concerning for acute process vs. Steroid use.   0642 Potassium(!): 3.4  Will replete. 9559 LACTIC ACID(!!): 2.3  Fluids started. Quan.Rosa [ ] Upright CXR, CT abd/pelvis  [ ] UA   0705 No air under diaphragm. SBIRT 20yo+      Flowsheet Row Most Recent Value   Initial Alcohol Screen: US AUDIT-C     1. How often do you have a drink containing alcohol? 0 Filed at: 12/05/2023 0601   2. How many drinks containing alcohol do you have on a typical day you are drinking? 0 Filed at: 12/05/2023 0601   3a. Male UNDER 65: How often do you have five or more drinks on one occasion? 0 Filed at: 12/05/2023 0601   Audit-C Score 0 Filed at: 12/05/2023 0601   BULMARO: How many times in the past year have you. .. Used an illegal drug or used a prescription medication for non-medical reasons? Never Filed at: 12/05/2023 0154                      Medical Decision Making  I reviewed the patient's medical chart, PMHx, prior encounters, medications. I extensively reviewed patient's recent admission at [facility], where he was admitted from the 22nd and underwent CT scan demonstrating Crohn's flare with evidence of bowel obstruction. My DDx includes: Crohn's flare, appendicitis, colitis, bowel obstruction, diverticulitis    Will obtain GI labs, CT abdomen pelvis. Will treat symptoms, Dilaudid and Zofran given. Will reassess.     Strongly suspect bowel obstruction. Lactic acidosis of 2.3, fluids given. WBC count of 15 unsure if due to steroids vs. Infection. Patient signed out to Dr. Anaya Brahser pending CT scan results. Amount and/or Complexity of Data Reviewed  Labs: ordered. Decision-making details documented in ED Course. Radiology: ordered. Risk  Prescription drug management. Decision regarding hospitalization. Disposition  Final diagnoses:   SBO (small bowel obstruction) ([address])     Time reflects when diagnosis was documented in both MDM as applicable and the Disposition within this note       Time User Action Codes Description Comment    12/5/2023  9:34 AM Ovidiorobina Lucretia FERRARA Add [K56.609] SBO (small bowel obstruction) ([address])     12/5/2023 12:11 PM Galilea Pope Add [N28.89] Right kidney mass     12/5/2023 12:16 PM Galilea Pope Add [K50.118] Crohn's colitis, other complication [facility])           ED Disposition       ED Disposition   Admit    Condition   Stable    Date/Time   Tue Dec 5, 2023 9937    Comment   Case was discussed with JOAN and the patient's admission status was agreed to be Admission Status: inpatient status to the service of Dr. Olivia Berman.                Follow-up Information       Follow up With Specialties Details Why Contact Info Additional [id number] [facility] Follow up follow up for incidental renal mass [address] [place] [phone] [id number] [facility], [address] [place] [address] [address], [address]            Current Discharge Medication List        CONTINUE these medications which have NOT CHANGED    Details   amLODIPine (NORVASC) 2.5 mg tablet Take 2.5 mg by mouth daily      omeprazole (PriLOSEC) 40 MG capsule Take 40 mg by mouth daily      predniSONE 10 mg tablet Take by mouth daily      sulfamethoxazole-trimethoprim (BACTRIM) 400-80 mg per tablet Take 1 tablet by mouth daily      Upadacitinib ER (Rinvoq) 45 MG TB24 Take 45 mg by mouth daily      fluticasone (FLONASE) 50 mcg/act nasal spray 1 spray into each nostril daily             No discharge procedures on file.     PDMP Review       None            ED Provider  Electronically Signed by             Irene Olvera MD  12/05/23 1076

## 2023-12-05 NOTE — H&P
[address]  Progress Note  Name: Saqib Stern  MRN: [id number]  Unit/Bed#: 602-24 I Date of Admission: 12/5/2023   Date of Service: 12/5/2023 I Hospital Day: 0    Assessment/Plan   SBO (small bowel obstruction) ([address] )  Assessment & Plan  Hx of SBO secondary to Crohn' disesase  PMH of Crohn's disease, complicated by fibrostenotic region, s/p R hemicolectomy w anastomosis 2012,     Presented to the ER for worsening abdominal pain, nausea, vomiting since last night after eating. In ER today:  IVF, pain ctrl, NPO, NG tube    CT scan 11/05/2023: Distended loops of small bowel compatible with small bowel obstruction. Prior partial colectomy. Small ascites in the abdomen and mesentery. Per surgery - "No acute surgical intervention warranted at this time"    Afebrile, V/S stable, hypertensive SBP 170s  Mild hypokalemia, 3.4  Lactic acidosis, 2.3  Leukocytosis, WBC 16.34  Abdomen:soft, distended, tympany, tender to palpation, no peritoneal signs    Plan:  IVF  NG tube, suction  NPO  Pain ctrl  Antiemetics PRN  IV pantoprazole 40 mg, daily  CBC w diff  CMP, re-check K tomorrow AM  Procalcitonin  Lactic acid  Monitor bowel function  Serial abdom exam  I/Os  GI consult appreciated  Urology consult appreciated    Crohn's colitis, other complication [facility])  Assessment & Plan  PMH of Crohn's disease, complicated by fibrostenotic region, s/p R hemicolectomy w anastomosis 2012,   H/o recent admission for Crohn's flare,   - CT scan 11/22/2023: possible SBO, enteritis, colitis. - Colonoscopy 11/24/2023: ulcer in colon, regions of severe colitis. - Was discharged on prednisone taper, Rinvoq 45 mg daily, states he is compliant.     GI consult appreciated      Essential (primary) hypertension  Assessment & Plan  /106  As pt is currently NPO due to SBO, hold PO meds, ctrl BP w labetalol IV PRN if BP >175    Right kidney mass  Assessment & Plan  CT abdomen and pelvis 12/05/2023: Heterogeneously enhancing mass within the right kidney most concerning for renal cell carcinoma. Urology consult appreciated  F/U w urology o/p after d/c           VTE Prophylaxis: Enoxaparin (Lovenox)  / sequential compression device   Code Status: Level 1 - Full code  POLST: POLST is not applicable to this patient  Discussion with family:    Anticipated Length of Stay:  Patient will be admitted on an Inpatient basis with an anticipated length of stay of  > 2 midnights. Justification for Hospital Stay: Small bowel obstruction    Total Time for Visit, including Counseling / Coordination of Care: 60 minutes. Greater than 50% of this total time spent on direct patient counseling and coordination of care. Chief Complaint:   Abdominal pain    History of Present Illness:    50 y.o. male PMH of Crohn's disease complicated by fibrostenotic region status post right hemicolectomy with anastomosis in 2012, with a history of recent admission to [place] for Crohn's flare, presented to the emergency room with C/O abdominal pain, nausea, vomiting. Patient was recently admitted to [place]  for the Crohn's flare where he stayed for an inpatient treatment for several days, where a CT scan and colonoscopy were performed. At that time CT scan showed possible small bowel obstruction, enteritis, colitis, and colonoscopy showed evidence suggestive of ulceration of colon, regions of severe colitis. Patient was treated with IV Solu-Medrol and evaluated by Gastroenterology. Patient's symptoms improved,he tolerated diet well, remained hemodynamically and medically stable and was discharged with long prednisone taper,omeprazole,and Bactrim. Yesterday in the evening, after he ate dinner, patient started experiencing abdominal discomfort abdominal pain, nausea, episodes of vomiting. His last bowel movement was yesterday, no blood in the stool was noticed by the patient.  His abdominal pain worsened since yesterday, and today became " severe", so the patient could not tolerate it anymore, and came to the ER. Review of Systems:    Review of Systems   Constitutional:  Positive for appetite change. Negative for chills, diaphoresis and fever. HENT: Negative. Eyes: Negative. Respiratory:  Negative for chest tightness, shortness of breath and wheezing. Cardiovascular:  Negative for chest pain and palpitations. Gastrointestinal:  Positive for abdominal distention, abdominal pain, nausea and vomiting. Negative for blood in stool and diarrhea. Endocrine: Negative. Genitourinary:  Negative for difficulty urinating, dysuria, flank pain, frequency and urgency. Musculoskeletal: Negative. Skin: Negative. Allergic/Immunologic:        Reports no known allergies. Neurological:  Negative for dizziness, seizures, light-headedness and headaches. Hematological: Negative. Psychiatric/Behavioral:  Negative for confusion. Past Medical and Surgical History:     Past Medical History:   Diagnosis Date    Crohn's disease [facility])        Past Surgical History:   Procedure Laterality Date    COLECTOMY      right hemicolectomy? 2012       Meds/Allergies:    Prior to Admission medications    Medication Sig Start Date End Date Taking?  Authorizing Provider   amLODIPine (NORVASC) 2.5 mg tablet Take 2.5 mg by mouth daily 9/14/23  Yes Historical Provider, MD   omeprazole (PriLOSEC) 40 MG capsule Take 40 mg by mouth daily 11/29/23  Yes Historical Provider, MD   predniSONE 10 mg tablet Take by mouth daily 11/28/23 1/23/24 Yes Historical Provider, MD   sulfamethoxazole-trimethoprim (BACTRIM) 400-80 mg per tablet Take 1 tablet by mouth daily 11/28/23 1/23/24 Yes Historical Provider, MD   Upadacitinib ER (Rinvoq) 45 MG TB24 Take 45 mg by mouth daily 10/4/23  Yes Historical Provider, MD   fluticasone (FLONASE) 50 mcg/act nasal spray 1 spray into each nostril daily 9/14/23   Historical Provider, MD     I have reviewed home medications using allscripts. Allergies: Allergies   Allergen Reactions    Tetanus-Diphth-Acell Pertussis Other (See Comments)     Swelling, fever and redness at injection site       Social History:     Marital Status: Single   Occupation: not relevant  Substance Use History:   Social History     Substance and Sexual Activity   Alcohol Use Never     Social History     Tobacco Use   Smoking Status Never   Smokeless Tobacco Never     Social History     Substance and Sexual Activity   Drug Use Never       Family History:    non-contributory    Physical Exam:     Vitals:   Blood Pressure: (!) 161/106 (12/05/23 1629)  Pulse: 69 (12/05/23 1301)  Temperature: 97.8 °F (36.6 °C) (12/05/23 1629)  Temp Source: Temporal (12/05/23 0558)  Respirations: 18 (12/05/23 1629)  Height: 6' 1" (185.4 cm) (12/05/23 1629)  Weight - Scale: 87.2 kg (192 lb 3.2 oz) (12/05/23 1629)  SpO2: 95 % (12/05/23 1131)    Physical Exam  Vitals and nursing note reviewed. Constitutional:       Appearance: He is ill-appearing. HENT:      Head: Normocephalic and atraumatic. Nose: Nose normal.      Mouth/Throat:      Mouth: Mucous membranes are moist.      Pharynx: Oropharynx is clear. Eyes:      Conjunctiva/sclera: Conjunctivae normal.   Cardiovascular:      Rate and Rhythm: Normal rate and regular rhythm. Pulses: Normal pulses. Heart sounds: Normal heart sounds. Pulmonary:      Effort: Pulmonary effort is normal.      Breath sounds: Normal breath sounds. Abdominal:      General: A surgical scar is present. Bowel sounds are decreased. There is distension. Palpations: There is no pulsatile mass. Tenderness: There is abdominal tenderness. There is no guarding or rebound. Musculoskeletal:      Cervical back: Normal range of motion and neck supple. No rigidity. Neurological:      Mental Status: He is alert. Additional Data:     Lab Results: I have personally reviewed pertinent reports.       Results from last 7 days   Lab Units 12/05/23  0613   WBC Thousand/uL 15.34*   HEMOGLOBIN g/dL 15.6   HEMATOCRIT % 47.9   PLATELETS Thousands/uL 363   NEUTROS PCT % 83*   LYMPHS PCT % 8*   MONOS PCT % 6   EOS PCT % 0     Results from last 7 days   Lab Units 12/05/23  0613   SODIUM mmol/L 138   POTASSIUM mmol/L 3.4*   CHLORIDE mmol/L 99   CO2 mmol/L 28   BUN mg/dL 19   CREATININE mg/dL 1.01   ANION GAP mmol/L 11   CALCIUM mg/dL 9.8   ALBUMIN g/dL 4.5   TOTAL BILIRUBIN mg/dL 0.82   ALK PHOS U/L 74   ALT U/L 28   AST U/L 12*   GLUCOSE RANDOM mg/dL 102                 Results from last 7 days   Lab Units 12/05/23  1317 12/05/23  0613   LACTIC ACID mmol/L 1.6 2.3*       Imaging: I have personally reviewed pertinent reports. CT abdomen pelvis with contrast   Final Result by Asad Dunlap MD (12/05 0831)      Distended loops of small bowel compatible with small bowel obstruction with a transition point likely within the central abdomen just to the right of midline. Suspected prior partial colectomy. Small ascites in the abdomen and mesentery. Heterogeneously enhancing mass within the right kidney most concerning for renal cell carcinoma. The study was marked in [facility] for immediate notification. Workstation performed: [id number]         XR chest 1 view portable   Final Result by Susan Pink MD (12/05 1538)      No acute cardiopulmonary disease. Workstation performed: [id number]         XR chest 1 view portable    (Results Pending)       [facility] Records Reviewed: Yes     ** Please Note: This note has been constructed using a voice recognition system.  **

## 2023-12-05 NOTE — ED NOTES
Poor hygiene noted. Pt aware of need for urine specimen.  Unable to void at this time     Andrez Mott RN  12/05/23 2763

## 2023-12-05 NOTE — CONSULTS
Consultation - General Surgery   Fely Hernandez 50 y.o. male MRN: [id number]  Unit/Bed#: RM02 Encounter: [id number]    ASSESSMENT:  51 y/o M w/ PMH hypertension, Crohn's disease, and vitamin D deficiency who presents with abdominal pain and nausea/vomiting. SBO  -History of SBO secondary to Crohn's in the past  -Afebrile, vital stable, hypertensive SBP 170s  -Mild hypokalemia, 3.4  -Lactic acidosis, 2.3  -Leukocytosis, WBC 16.34  -CTAP with contrast reviewed. There are distended loops of small bowel with transition point in the central abdomen just to the right of midline compatible with small bowel obstruction. There is evidence of prior surgical history of partial colectomy.  -Abdomen is soft, distended and tympanic, with tenderness to palpation slightly to the right of midline, no peritoneal signs    Crohn's disease  -Per patient was diagnosed in his teenage years, underwent 2 surgeries in his 29's and reports a right hemicolectomy for "perforated bowel."   -Was recently discharged from [facility] for a Crohn's flare and was started on prednisone taper  -Says he was started on Rinvoq     Right kidney mass  -2.8 cm heterogeneous enhancing mass in the right kidney concerning for renal cell carcinoma incidentally found on CT w/ contrast on admission  -Per patient he is aware of this and previously had follow-up renal ultrasounds, at first this was thought to be a benign cyst however he knows he was supposed to follow-up with urology as an outpatient but never kept his appointments  -Denies any urological or systemic symptoms      PLAN:   -No surgical indication indicated at this time  -Recommend conservative medical management and supportive care  -Recommend NG tube placement for stomach decompression due to patient's abdominal distention, and dilated stomach on CT scan. Attached to low intermittent suction and flush with sterile water every 4 hours for patency.   -IVF  -Trend LA  -Analgesics/antiemetics as needed  -IV Protonix  -Monitor for bowel function, I/Os  -OOB ambulation, patient's tube can be clamped if patient is out of bed ambulating  -GI consultation for management of Crohn's  -Urology consultation for management of right kidney mass  -Serial abdominal exam  -Replete potassium, a.m. labs, replete lytes as needed  -Incentive spirometer (mild patchy dependent atelectasis in lower lobes incidentally seen on CT scan)  -Medical management of comorbid conditions per SLIM       SUBJECTIVE:    Chief Complaint: Abdominal pain, nausea/vomiting    HPI:  Lety Zayas is a 50 y.o. male with PMH hypertension, Crohn's disease, and vitamin D deficiency who presents with abdominal pain, nausea, vomiting. Patient was recently admitted to [facility] 2 weeks ago for similar symptoms. Work-up at that time showed elevated ERCP and CT scan of the abdomen with contrast showed right lower quadrant enteritis/recurrent Crohn's disease with possible small bowel dilation/obstruction diffuse wall thickening of sigmoid colon indicating colitis/recurrent Crohn's disease and interval increase of right renal mass and splenomegaly. He was hospitalized for this Crohn's flare and started on IV Solu-Medrol, evaluated by GI, and underwent colonoscopy. His symptoms improved and he tolerated diet and was discharged home with a prednisone taper, omeprazole, and Bactrim. Per patient he was feeling well after he was discharged but yesterday he had severe acute onset abdominal pain. Pain mostly localized to the midline, slightly to the right side of the abdomen. He has not passed any flatus, but has had a normal nonbloody bowel movement yesterday. Denies fever/chills. ROS:  Review of Systems   Constitutional:  Negative for appetite change, chills, fever and unexpected weight change. HENT: Negative. Eyes: Negative. Respiratory: Negative. Cardiovascular: Negative.     Gastrointestinal:  Positive for abdominal distention, abdominal pain, nausea and vomiting. Negative for constipation. Genitourinary: Negative. Musculoskeletal: Negative. Skin: Negative. Neurological: Negative. Hematological: Negative. Psychiatric/Behavioral: Negative. Historical Information   Past Medical History:   Diagnosis Date    Crohn's disease ([address])      History reviewed. No pertinent surgical history. Social History   Social History     Substance and Sexual Activity   Alcohol Use Never     Social History     Substance and Sexual Activity   Drug Use Never     Social History     Tobacco Use   Smoking Status Never   Smokeless Tobacco Never       Family History:   non-contributory    Meds/Allergies   all medications and allergies reviewed  Allergies   Allergen Reactions    Tetanus-Diphth-Acell Pertussis Other (See Comments)     Swelling, fever and redness at injection site         OBJECTIVE:  First Vitals:   Blood Pressure: (!) 202/113 (12/05/23 0558)  Pulse: 61 (12/05/23 0558)  Temperature: 97.8 °F (36.6 °C) (12/05/23 0558)  Temp Source: Temporal (12/05/23 0558)  Respirations: 18 (12/05/23 0558)  Height: 6' 1" (185.4 cm) (12/05/23 0558)  Weight - Scale: 86.2 kg (190 lb) (12/05/23 0558)  SpO2: 99 % (12/05/23 0558) Body mass index is 25.07 kg/m². Current Vitals:   Blood Pressure: (!) 179/109 (12/05/23 0900)  Pulse: 64 (12/05/23 0900)  Temperature: 97.8 °F (36.6 °C) (12/05/23 0558)  Temp Source: Temporal (12/05/23 0558)  Respirations: 17 (12/05/23 0900)  Height: 6' 1" (185.4 cm) (12/05/23 0558)  Weight - Scale: 86.2 kg (190 lb) (12/05/23 0558)  SpO2: 94 % (12/05/23 0900)   I/Os:  No intake or output data in the 24 hours ending 12/05/23 1011  Lines/Drains:  Invasive Devices       Peripheral Intravenous Line  Duration             Peripheral IV 12/05/23 Right Antecubital <1 day                    Physical Exam  Vitals reviewed. Constitutional:       General: He is not in acute distress.   HENT:      Head: Normocephalic and atraumatic. Cardiovascular:      Rate and Rhythm: Normal rate and regular rhythm. Heart sounds: No murmur heard. Pulmonary:      Effort: Pulmonary effort is normal.      Breath sounds: No wheezing, rhonchi or rales. Abdominal:      General: There is distension. Palpations: Abdomen is soft. Tenderness: There is abdominal tenderness (Supraumbilical, midline). There is no guarding or rebound. Comments: Scant bowel sounds  Midline laparotomy scar noted   Musculoskeletal:         General: No tenderness. Cervical back: Neck supple. Right lower leg: No edema. Left lower leg: No edema. Skin:     General: Skin is warm and dry. Neurological:      General: No focal deficit present. Mental Status: He is alert. Lab Results: I have personally reviewed pertinent lab results.     Recent Results (from the past 36 hour(s))   CBC and differential    Collection Time: 12/05/23  6:13 AM   Result Value Ref Range    WBC 15.34 (H) 4.31 - 10.16 Thousand/uL    RBC 5.82 (H) 3.88 - 5.62 Million/uL    Hemoglobin 15.6 12.0 - 17.0 g/dL    Hematocrit 47.9 36.5 - 49.3 %    MCV 82 82 - 98 fL    MCH 26.8 26.8 - 34.3 pg    MCHC 32.6 31.4 - 37.4 g/dL    RDW 15.9 (H) 11.6 - 15.1 %    MPV 8.9 8.9 - 12.7 fL    Platelets 908 121 - 061 Thousands/uL    nRBC 0 /100 WBCs    Neutrophils Relative 83 (H) 43 - 75 %    Immat GRANS % 2 0 - 2 %    Lymphocytes Relative 8 (L) 14 - 44 %    Monocytes Relative 6 4 - 12 %    Eosinophils Relative 0 0 - 6 %    Basophils Relative 1 0 - 1 %    Neutrophils Absolute 12.72 (H) 1.85 - 7.62 Thousands/µL    Immature Grans Absolute 0.37 (H) 0.00 - 0.20 Thousand/uL    Lymphocytes Absolute 1.29 0.60 - 4.47 Thousands/µL    Monocytes Absolute 0.84 0.17 - 1.22 Thousand/µL    Eosinophils Absolute 0.03 0.00 - 0.61 Thousand/µL    Basophils Absolute 0.09 0.00 - 0.10 Thousands/µL   Comprehensive metabolic panel    Collection Time: 12/05/23  6:13 AM   Result Value Ref Range    Sodium 138 135 - 147 mmol/L    Potassium 3.4 (L) 3.5 - 5.3 mmol/L    Chloride 99 96 - 108 mmol/L    CO2 28 21 - 32 mmol/L    ANION GAP 11 mmol/L    BUN 19 5 - 25 mg/dL    Creatinine 1.01 0.60 - 1.30 mg/dL    Glucose 102 65 - 140 mg/dL    Calcium 9.8 8.4 - 10.2 mg/dL    AST 12 (L) 13 - 39 U/L    ALT 28 7 - 52 U/L    Alkaline Phosphatase 74 34 - 104 U/L    Total Protein 6.4 6.4 - 8.4 g/dL    Albumin 4.5 3.5 - 5.0 g/dL    Total Bilirubin 0.82 0.20 - 1.00 mg/dL    eGFR 87 ml/min/1.73sq m   Lipase    Collection Time: 12/05/23  6:13 AM   Result Value Ref Range    Lipase 13 11 - 82 u/L   Sedimentation rate, automated    Collection Time: 12/05/23  6:13 AM   Result Value Ref Range    Sed Rate <1 0 - 14 mm/hour   C-reactive protein    Collection Time: 12/05/23  6:13 AM   Result Value Ref Range    CRP 2.4 <3.0 mg/L   Lactic acid, plasma (w/reflex if result > 2.0)    Collection Time: 12/05/23  6:13 AM   Result Value Ref Range    LACTIC ACID 2.3 (HH) 0.5 - 2.0 mmol/L     Imaging: I have personally reviewed pertinent reports. CT abdomen pelvis with contrast    Result Date: 12/5/2023  Impression: Distended loops of small bowel compatible with small bowel obstruction with a transition point likely within the central abdomen just to the right of midline. Suspected prior partial colectomy. Small ascites in the abdomen and mesentery. Heterogeneously enhancing mass within the right kidney most concerning for renal cell carcinoma. The study was marked in [facility] for immediate notification. Workstation performed: [id number]     EKG, Pathology, and Other Studies: I have personally reviewed pertinent reports.         ADITYA Fraga  12/5/2023

## 2023-12-06 LAB
ALBUMIN SERPL BCP-MCNC: 3.6 G/DL (ref 3.5–5)
ALP SERPL-CCNC: 73 U/L (ref 34–104)
ALT SERPL W P-5'-P-CCNC: 20 U/L (ref 7–52)
ANION GAP SERPL CALCULATED.3IONS-SCNC: 8 MMOL/L
AST SERPL W P-5'-P-CCNC: 10 U/L (ref 13–39)
BASOPHILS # BLD AUTO: 0.06 THOUSANDS/ÂΜL (ref 0–0.1)
BASOPHILS NFR BLD AUTO: 1 % (ref 0–1)
BILIRUB SERPL-MCNC: 0.95 MG/DL (ref 0.2–1)
BILIRUB UR QL STRIP: NEGATIVE
BUN SERPL-MCNC: 15 MG/DL (ref 5–25)
CALCIUM SERPL-MCNC: 8.6 MG/DL (ref 8.4–10.2)
CHLORIDE SERPL-SCNC: 99 MMOL/L (ref 96–108)
CLARITY UR: CLEAR
CO2 SERPL-SCNC: 28 MMOL/L (ref 21–32)
COLOR UR: YELLOW
CREAT SERPL-MCNC: 0.9 MG/DL (ref 0.6–1.3)
EOSINOPHIL # BLD AUTO: 0.08 THOUSAND/ÂΜL (ref 0–0.61)
EOSINOPHIL NFR BLD AUTO: 1 % (ref 0–6)
ERYTHROCYTE [DISTWIDTH] IN BLOOD BY AUTOMATED COUNT: 15.9 % (ref 11.6–15.1)
GFR SERPL CREATININE-BSD FRML MDRD: 100 ML/MIN/1.73SQ M
GLUCOSE SERPL-MCNC: 101 MG/DL (ref 65–140)
GLUCOSE UR STRIP-MCNC: NEGATIVE MG/DL
HCT VFR BLD AUTO: 44.5 % (ref 36.5–49.3)
HGB BLD-MCNC: 14.4 G/DL (ref 12–17)
HGB UR QL STRIP.AUTO: NEGATIVE
IMM GRANULOCYTES # BLD AUTO: 0.16 THOUSAND/UL (ref 0–0.2)
IMM GRANULOCYTES NFR BLD AUTO: 1 % (ref 0–2)
KETONES UR STRIP-MCNC: NEGATIVE MG/DL
LEUKOCYTE ESTERASE UR QL STRIP: NEGATIVE
LYMPHOCYTES # BLD AUTO: 0.81 THOUSANDS/ÂΜL (ref 0.6–4.47)
LYMPHOCYTES NFR BLD AUTO: 6 % (ref 14–44)
MAGNESIUM SERPL-MCNC: 2.5 MG/DL (ref 1.9–2.7)
MCH RBC QN AUTO: 26.8 PG (ref 26.8–34.3)
MCHC RBC AUTO-ENTMCNC: 32.4 G/DL (ref 31.4–37.4)
MCV RBC AUTO: 83 FL (ref 82–98)
MONOCYTES # BLD AUTO: 0.7 THOUSAND/ÂΜL (ref 0.17–1.22)
MONOCYTES NFR BLD AUTO: 6 % (ref 4–12)
NEUTROPHILS # BLD AUTO: 10.85 THOUSANDS/ÂΜL (ref 1.85–7.62)
NEUTS SEG NFR BLD AUTO: 85 % (ref 43–75)
NITRITE UR QL STRIP: NEGATIVE
NRBC BLD AUTO-RTO: 0 /100 WBCS
PH UR STRIP.AUTO: 7 [PH]
PLATELET # BLD AUTO: 286 THOUSANDS/UL (ref 149–390)
PMV BLD AUTO: 9.3 FL (ref 8.9–12.7)
POTASSIUM SERPL-SCNC: 4 MMOL/L (ref 3.5–5.3)
PROCALCITONIN SERPL-MCNC: <0.05 NG/ML
PROT SERPL-MCNC: 5.2 G/DL (ref 6.4–8.4)
PROT UR STRIP-MCNC: NEGATIVE MG/DL
RBC # BLD AUTO: 5.37 MILLION/UL (ref 3.88–5.62)
SODIUM SERPL-SCNC: 135 MMOL/L (ref 135–147)
SP GR UR STRIP.AUTO: 1.02 (ref 1–1.03)
UROBILINOGEN UR QL STRIP.AUTO: 1 E.U./DL
WBC # BLD AUTO: 12.66 THOUSAND/UL (ref 4.31–10.16)

## 2023-12-06 PROCEDURE — 84145 PROCALCITONIN (PCT): CPT

## 2023-12-06 PROCEDURE — 83735 ASSAY OF MAGNESIUM: CPT | Performed by: FAMILY MEDICINE

## 2023-12-06 PROCEDURE — 99222 1ST HOSP IP/OBS MODERATE 55: CPT | Performed by: INTERNAL MEDICINE

## 2023-12-06 PROCEDURE — 99232 SBSQ HOSP IP/OBS MODERATE 35: CPT | Performed by: SURGERY

## 2023-12-06 PROCEDURE — 81003 URINALYSIS AUTO W/O SCOPE: CPT | Performed by: FAMILY MEDICINE

## 2023-12-06 PROCEDURE — 85025 COMPLETE CBC W/AUTO DIFF WBC: CPT | Performed by: FAMILY MEDICINE

## 2023-12-06 PROCEDURE — C9113 INJ PANTOPRAZOLE SODIUM, VIA: HCPCS | Performed by: PHYSICIAN ASSISTANT

## 2023-12-06 PROCEDURE — 99232 SBSQ HOSP IP/OBS MODERATE 35: CPT | Performed by: FAMILY MEDICINE

## 2023-12-06 PROCEDURE — 80053 COMPREHEN METABOLIC PANEL: CPT | Performed by: FAMILY MEDICINE

## 2023-12-06 RX ORDER — METHYLPREDNISOLONE SODIUM SUCCINATE 40 MG/ML
20 INJECTION, POWDER, LYOPHILIZED, FOR SOLUTION INTRAMUSCULAR; INTRAVENOUS EVERY 8 HOURS SCHEDULED
Status: DISCONTINUED | OUTPATIENT
Start: 2023-12-06 | End: 2023-12-09

## 2023-12-06 RX ADMIN — SODIUM CHLORIDE, SODIUM GLUCONATE, SODIUM ACETATE, POTASSIUM CHLORIDE, MAGNESIUM CHLORIDE, SODIUM PHOSPHATE, DIBASIC, AND POTASSIUM PHOSPHATE 100 ML/HR: .53; .5; .37; .037; .03; .012; .00082 INJECTION, SOLUTION INTRAVENOUS at 12:23

## 2023-12-06 RX ADMIN — ENOXAPARIN SODIUM 40 MG: 40 INJECTION SUBCUTANEOUS at 08:04

## 2023-12-06 RX ADMIN — MORPHINE SULFATE 4 MG: 4 INJECTION, SOLUTION INTRAMUSCULAR; INTRAVENOUS at 00:19

## 2023-12-06 RX ADMIN — MORPHINE SULFATE 4 MG: 4 INJECTION, SOLUTION INTRAMUSCULAR; INTRAVENOUS at 12:32

## 2023-12-06 RX ADMIN — MORPHINE SULFATE 2 MG: 2 INJECTION, SOLUTION INTRAMUSCULAR; INTRAVENOUS at 15:53

## 2023-12-06 RX ADMIN — MORPHINE SULFATE 4 MG: 4 INJECTION, SOLUTION INTRAMUSCULAR; INTRAVENOUS at 06:16

## 2023-12-06 RX ADMIN — PANTOPRAZOLE SODIUM 40 MG: 40 INJECTION, POWDER, LYOPHILIZED, FOR SOLUTION INTRAVENOUS at 08:03

## 2023-12-06 RX ADMIN — MORPHINE SULFATE 4 MG: 4 INJECTION, SOLUTION INTRAMUSCULAR; INTRAVENOUS at 19:44

## 2023-12-06 RX ADMIN — METHYLPREDNISOLONE SODIUM SUCCINATE 20 MG: 40 INJECTION, POWDER, FOR SOLUTION INTRAMUSCULAR; INTRAVENOUS at 13:48

## 2023-12-06 RX ADMIN — SODIUM CHLORIDE, SODIUM GLUCONATE, SODIUM ACETATE, POTASSIUM CHLORIDE, MAGNESIUM CHLORIDE, SODIUM PHOSPHATE, DIBASIC, AND POTASSIUM PHOSPHATE 100 ML/HR: .53; .5; .37; .037; .03; .012; .00082 INJECTION, SOLUTION INTRAVENOUS at 00:20

## 2023-12-06 RX ADMIN — METHYLPREDNISOLONE SODIUM SUCCINATE 20 MG: 40 INJECTION, POWDER, FOR SOLUTION INTRAMUSCULAR; INTRAVENOUS at 21:03

## 2023-12-06 RX ADMIN — METHYLPREDNISOLONE SODIUM SUCCINATE 20 MG: 40 INJECTION, POWDER, FOR SOLUTION INTRAMUSCULAR; INTRAVENOUS at 09:30

## 2023-12-06 NOTE — ASSESSMENT & PLAN NOTE
CT abdomen and pelvis 12/05/2023: Heterogeneously enhancing mass within the right kidney most concerning for renal cell carcinoma.    Urology consult appreciated  F/U w urology o/p after d/c

## 2023-12-06 NOTE — PROGRESS NOTES
Progress Note - General Surgery   Bryan Bryant 50 y.o. male MRN: [id number]  Unit/Bed#: 985-92 Encounter: [id number]    Assessment:  59-year-old gentleman with known hypertension, Crohn's disease, admitted with underlying Crohn's flare and associated small bowel obstruction. Recent admission is noted prior for what appeared to be Crohn's flare with work-up showing inflammation within the sigmoid and rectum. However patient small bowel obstruction unclear if this is secondary to Crohn's versus adhesions. NG tube placed yesterday with improved abdominal exam.  Still distended. States he has been passing small amount of flatus. Leukocytosis improving. Plan:    Small bowel obstruction:  -NG tube to low intermittent wall suction  -Encourage ambulation  -IV fluid hydration  -We will plan for Gastrografin challenge tomorrow      Crohn's disease:  -Potential ongoing flare as he was on a steroid taper  -Appreciate GI evaluation recommendations  -As per noted. Stat IV steroids will be started, but will defer to GI for definitive Crohn's management    Leukocytosis:  -Multifactorial could be underlying steroid treatment, Crohn's flare, or hypovolemia volume contraction due to small bowel obstruction  -IV hydration  -IV steroids as per GI  -Daily labs trend WBC    Lactic acidosis:  -Resolved      Subjective/Objective   Chief Complaint: Abdominal discomfort    Subjective: Patient does complain of abdominal distention and pain. However he does feel better than yesterday. He was able to pass lobed of gas. No fevers or chills. No nausea vomiting. Objective:     Blood pressure 136/91, pulse 79, temperature 98 °F (36.7 °C), resp. rate 18, height 6' 1" (1.854 m), weight 87.2 kg (192 lb 3.2 oz), SpO2 95 %. ,Body mass index is 25.36 kg/m².       Intake/Output Summary (Last 24 hours) at 12/6/2023 1324  Last data filed at 12/6/2023 1235  Gross per 24 hour   Intake 2030 ml   Output 1850 ml   Net 180 ml       Invasive Devices Peripheral Intravenous Line  Duration             Peripheral IV 12/05/23 Right Antecubital 1 day              Drain  Duration             NG/OG/Enteral Tube Nasogastric 14 Fr Right nare 1 day                    Physical Exam:   General: No acute distress, but appears uncomfortable  HEENT: Normocephalic, atraumatic, trachea midline, NG tube in place with enteric output  CV: S1 Leanna, regular rhythm  Pulmonary: No use accessory muscles, noes respiratory distress, no audible wheezing  GI: Abdomen is distended but soft, diffusely tender  MSK: Extremities are clubbing or cyanosis  Neurologic: Alert and oriented x3, cranial 2 through 12 grossly intact  Psych: Mood affect appropriate              Lab, Imaging and other studies:I have personally reviewed pertinent lab results.   , CBC:   Lab Results   Component Value Date    WBC 12.66 (H) 12/06/2023    HGB 14.4 12/06/2023    HCT 44.5 12/06/2023    MCV 83 12/06/2023     12/06/2023    RBC 5.37 12/06/2023    MCH 26.8 12/06/2023    MCHC 32.4 12/06/2023    RDW 15.9 (H) 12/06/2023    MPV 9.3 12/06/2023    NRBC 0 12/06/2023   , CMP:   Lab Results   Component Value Date    SODIUM 135 12/06/2023    K 4.0 12/06/2023    CL 99 12/06/2023    CO2 28 12/06/2023    BUN 15 12/06/2023    CREATININE 0.90 12/06/2023    CALCIUM 8.6 12/06/2023    AST 10 (L) 12/06/2023    ALT 20 12/06/2023    ALKPHOS 73 12/06/2023    EGFR 100 12/06/2023   , Coagulation: No results found for: "PT", "INR", "APTT", Urinalysis:   Lab Results   Component Value Date    COLORU Yellow 12/06/2023    CLARITYU Clear 12/06/2023    SPECGRAV 1.020 12/06/2023    PHUR 7.0 12/06/2023    LEUKOCYTESUR Negative 12/06/2023    NITRITE Negative 12/06/2023    GLUCOSEU Negative 12/06/2023    KETONESU Negative 12/06/2023    BILIRUBINUR Negative 12/06/2023    BLOODU Negative 12/06/2023   , Amylase: No results found for: "AMYLASE", Lipase: No results found for: "LIPASE"  VTE Pharmacologic Prophylaxis: Enoxaparin (Lovenox)  VTE Mechanical Prophylaxis: sequential compression device

## 2023-12-06 NOTE — CASE MANAGEMENT
Case Management Discharge Planning Note    Patient name Frida Vargas  Location [address] [facility] [id number] MRN [id number]  : 1975 Date 2023       Current Admission Date: 2023  Current Admission Diagnosis:SBO (small bowel obstruction) [facility])   Patient Active Problem List    Diagnosis Date Noted    Right kidney mass 2023    SBO (small bowel obstruction) ([address]) 2023    Crohn's colitis, other complication ([address])     Essential (primary) hypertension 2023      LOS (days): 1  Geometric Mean LOS (GMLOS) (days):   Days to GMLOS:     OBJECTIVE:  Risk of Unplanned Readmission Score: 7.56         Current admission status: Inpatient   Preferred Pharmacy:   [address] [place] [address] [id number] [facility] [place] [facility] [place]  [facility]  [address] [place] [address]  Phone: [phone] Fax: [phone]    Primary Care Provider: Gagandeep Perkins PA-C    Primary Insurance: [facility]  Secondary Insurance:     DISCHARGE DETAILS:  Chart review done, no CM needs identified at this time. Will follow if needs arise.

## 2023-12-06 NOTE — PROGRESS NOTES
[address]  Progress Note  Name: Jared Kulkarni  MRN: [id number]  Unit/Bed#: 174-47 I Date of Admission: 12/5/2023   Date of Service: 12/6/2023 I Hospital Day: 1    Assessment/Plan   * SBO (small bowel obstruction) ([address] )  Assessment & Plan  Hx of SBO secondary to Crohn' disesase  PMH of Crohn's disease, complicated by fibrostenotic region, s/p R hemicolectomy w anastomosis 2012,     Presented to the ER for worsening abdominal pain, nausea, vomiting since last night after eating. In ER today:  IVF, pain ctrl, NPO, NG tube    CT scan 11/05/2023: Distended loops of small bowel compatible with small bowel obstruction. Prior partial colectomy. Small ascites in the abdomen and mesentery. Per surgery - "No acute surgical intervention warranted at this time"    Afebrile, V/S stable, /91 (improved from yesterday)  Potassium normalized today 4.0, from 3.4 yesterday  Lactic acidosis today 1.6, improved from 2.3  Leukocytosis, WBC 12.66, trending down from 16.34  Abdomen:soft, distended, tympany, tender to palpation, no peritoneal signs    Plan:  IVF  NG tube, suction  NPO  Pain ctrl  Antiemetics PRN  IV pantoprazole 40 mg, daily  CBC w diff  CMP  Procalcitonin  Monitor bowel function  Serial abdom exam  I/Os  GI consult appreciated  Urology consult appreciated    Crohn's colitis, other complication [facility])  Assessment & Plan  PMH of Crohn's disease, complicated by fibrostenotic region, s/p R hemicolectomy w anastomosis 2012,   H/o recent admission for Crohn's flare,   - CT scan 11/22/2023: possible SBO, enteritis, colitis. - Colonoscopy 11/24/2023: ulcer in colon, regions of severe colitis. - Was discharged on prednisone taper, Rinvoq 45 mg daily, states he is compliant. GI is on board, input appreciated    Hold antibiotics for now. Restart Rinvoq 45 mg daily if on formulary. (Patient has some supplies of Rinvoq with him).   Continue n.p.o. and NG tube, monitor NG tube output per shift.  Continue to maintain a large bore IV. Continue to trend blood work and vital signs, specifically monitoring WBCs and any signs of infection. Continue with serial abdominal exams. Continue to monitor stool output for any overt signs of GI bleeding. Start IV Solu-Medrol 20 mg 3 times daily to treat Crohn's flareup.and can convert to p.o. once patient's NG tube is out and is tolerating p.o. Continue PPI as ordered. Continue PRN antiemetics as ordered. Continue supportive care. Essential (primary) hypertension  Assessment & Plan  BP today 136/91, improved from yesterday  As pt is currently NPO due to SBO, hold PO meds, ctrl BP w labetalol IV PRN if BP >175    Right kidney mass  Assessment & Plan  CT abdomen and pelvis 12/05/2023: Heterogeneously enhancing mass within the right kidney most concerning for renal cell carcinoma. Urology consult appreciated  F/U w urology o/p after d/c           Subjective/Objective     Subjective:   Patient seen today at bedside, states that he has abdominal pain and tenderness decreased today, and he feels slightly better. He passed flatus 2 times with very little relief of his symptoms. Patient reports dryness of mouth. Surinder June has his Renvoq with him, but unsure how many tablets left, said he will check it later. Denies nausea or vomiting, chest pain or shortness of breath fever or chills. No bowel movement today. Objective:  Vitals: Blood pressure 136/91, pulse 79, temperature 98 °F (36.7 °C), resp. rate 18, height 6' 1" (1.854 m), weight 87.2 kg (192 lb 3.2 oz), SpO2 95 %. ,Body mass index is 25.36 kg/m².       Intake/Output Summary (Last 24 hours) at 12/6/2023 1143  Last data filed at 12/6/2023 0901  Gross per 24 hour   Intake 1030 ml   Output 1850 ml   Net -820 ml       Invasive Devices       Peripheral Intravenous Line  Duration             Peripheral IV 12/05/23 Right Antecubital 1 day              Drain  Duration             NG/OG/Enteral Tube Nasogastric 14 Fr Right nare 1 day                . CW  Physical Exam: /91   Pulse 79   Temp 98 °F (36.7 °C)   Resp 18   Ht 6' 1" (1.854 m)   Wt 87.2 kg (192 lb 3.2 oz)   SpO2 95%   BMI 25.36 kg/m²   General appearance: alert and oriented, in no acute distress  Head: Normocephalic, without obvious abnormality, atraumatic  Eyes: negative findings: conjunctivae and sclerae normal  Ears: normal TM's and external ear canals both ears  Nose: Nares normal. Septum midline. Mucosa normal. No drainage or sinus tenderness. Throat: normal findings: lips normal without lesions and tongue midline and normal  Neck: no adenopathy, no carotid bruit, no JVD, supple, symmetrical, trachea midline, and thyroid not enlarged, symmetric, no tenderness/mass/nodules  Lungs: clear to auscultation bilaterally  Heart: regular rate and rhythm, S1, S2 normal, no murmur, click, rub or gallop  Abdomen: normal findings: no bruits heard and abnormal findings:  hypoactive bowel sounds and moderate tenderness in the periumbilical area, in the RUQ, and in the RLQ  Extremities: extremities normal, warm and well-perfused; no cyanosis, clubbing, or edema  Pulses: 2+ and symmetric  Skin: Skin color, texture, turgor normal. No rashes or lesions  Neurologic: Grossly normal    Lab, Imaging and other studies: I have personally reviewed pertinent reports.     VTE Pharmacologic Prophylaxis: Enoxaparin (Lovenox)  VTE Mechanical Prophylaxis: sequential compression device

## 2023-12-06 NOTE — ASSESSMENT & PLAN NOTE
Hx of SBO secondary to Crohn' disesase  PMH of Crohn's disease, complicated by fibrostenotic region, s/p R hemicolectomy w anastomosis 2012,     Presented to the ER for worsening abdominal pain, nausea, vomiting since last night after eating. In ER today:  IVF, pain ctrl, NPO, NG tube    CT scan 11/05/2023: Distended loops of small bowel compatible with small bowel obstruction. Prior partial colectomy. Small ascites in the abdomen and mesentery.     Per surgery - "No acute surgical intervention warranted at this time"    Afebrile, V/S stable, /91 (improved from yesterday)  Potassium normalized today 4.0, from 3.4 yesterday  Lactic acidosis today 1.6, improved from 2.3  Leukocytosis, WBC 12.66, trending down from 16.34  Abdomen:soft, distended, tympany, tender to palpation, no peritoneal signs    Plan:  IVF  NG tube, suction  NPO  Pain ctrl  Antiemetics PRN  IV pantoprazole 40 mg, daily  CBC w diff  CMP  Procalcitonin  Monitor bowel function  Serial abdom exam  I/Os  GI consult appreciated  Urology consult appreciated

## 2023-12-06 NOTE — ASSESSMENT & PLAN NOTE
BP today 136/91, improved from yesterday  As pt is currently NPO due to SBO, hold PO meds, ctrl BP w labetalol IV PRN if BP >175

## 2023-12-06 NOTE — UTILIZATION REVIEW
NOTIFICATION OF INPATIENT ADMISSION   AUTHORIZATION REQUEST   SERVICING FACILITY:   [facility] [address]  [address]  Tax ID:  [id number]  NPI: [id number] ATTENDING PROVIDER:  Attending Name and NPI#: Moises Lugo Md [[id number]]  Address: [address] [place] [address]  Phone: [phone]     ADMISSION INFORMATION:  [facility]  Place of Service Code: 21  Inpatient Admission Date/Time: 12/5/23  9:34 AM  Discharge Date/Time: No discharge date for patient encounter. Admitting Diagnosis Code/Description:  SBO (small bowel obstruction) ([address]) [K56.609]  Abdominal pain [R10.9]  Right kidney mass [N28.89]  Crohn's colitis, other complication ([address]) [X59.512]     UTILIZATION REVIEW CONTACT:  Samy Alcaraz Utilization   Network Utilization Review Department  Phone: [phone]  Fax [phone]  Email: [email or web address]  Contact for approvals/pending authorizations, clinical reviews, and discharge. PHYSICIAN ADVISORY SERVICES:  Medical Necessity Denial & Yxrc-dc-Hibv Review  Phone: [phone]  Fax: [phone]  Email: [email or web address]     DISCHARGE SUPPORT TEAM:  For Patients Discharge Needs & Updates  Phone: [phone] opt. 2 Fax: [phone]  Email: [email or web address]

## 2023-12-06 NOTE — CONSULTS
Consultation - [address]  [facility]  Diane Shelby 50 y.o. male MRN: [id number]  Unit/Bed#: 849-88 Encounter: [id number]        Inpatient consult to gastroenterology  Consult performed by: Marylen Climes, CRNP  Consult ordered by: Virgilio Garcia MD          Reason for Consult / Principal Problem:     Crohn's colitis    ASSESSMENT AND PLAN:     The patient is a 50 y.o. male with a past medical history of Crohn's disease status post right hemicolectomy with anastomosis in 2012, who is being seen for a consultation today for his Crohn's colitis flareup. His CT scan of the abdomen pelvis did show distended loops of small bowel compatible with small bowel obstruction with a small amount of ascites and a heterogeneously enhancing mass within the right kidney most concerning for renal cell carcinoma. A urology consult was completed and the recommendation for 2 proceed with a possible biopsy versus radical nephrectomy, but does not require urgent urologic surgical intervention at this time secondary to his small bowel obstruction, with which general surgery also agreed. Serology: (12/6/23): AST 10, total protein 5.2, WBCs 12.66, RDW 15.9. Exam:  Oral mucosa normal upon visual inspection, without any sores, lesions, or ulcerations. The patient does have an NG tube to wall suction that is putting out a moderate amount of brown liquid. Sclera without icterus and benign. Lung sounds CTA b/l. Normal S1 & S2 upon exam. Abdomen is softly distended, with moderate tenderness noted upon exam of the bilateral lower quadrants with mild to moderate guarding, without any significant rebound tenderness noted upon exam with hypoactive bowel sounds x4. No edema noted of the b/l lower extremities upon exam today. Skin is non-icteric.      Crohn's colitis  Small bowel obstruction  Abdominal pain  Nausea and vomiting  Leukocytosis  Ascites  Right kidney mass    At this point in time since the patient continues with the nausea he should continue n.p.o. and the NG tube as ordered. We will start him on IV Solu-Medrol 20 mg 3 times daily to try to address the inflammation component and treat the current Crohn's flareup. If on formulary would recommend restarting Rinvoq 45 mg daily. Although the white blood cell count is elevated, since he does not have a fever and there is no specific or identified infection, would recommend holding off antibiotics for now. We currently do not see any role for any endoscopic procedures, unless the patient would develop any overt signs of GI bleeding. Continue n.p.o. and NG tube as ordered for now. Continue to maintain a large bore IV. Continue to trend blood work and vital signs, specifically monitoring WBCs and any signs of infection. Continue with serial abdominal exams. Continue to monitor NG tube output per shift. Continue to monitor stool output for any overt signs of GI bleeding. Start IV Solu-Medrol 20 mg 3 times daily and can convert to p.o. once patient's NG tube is out and is tolerating p.o. Continue PPI as ordered. Continue PRN antiemetics as ordered. Continue rest of medications as per primary team.   Continue supportive care. GI will continue to follow. ______________________________________________________________________    HPI: Patient is a 50 y.o. male with a past medical history of Crohn's disease status post right hemicolectomy with anastomosis in 2012 who presented to the ED on December 5, 2023 for worsening nausea, vomiting (non-bloody), and lower abdominal pain. The patient continues to report nausea, but without vomiting as he does have an NG tube in which has put out 350 mL overnight. The patient continues to report bilateral lower abdominal pain. He has not had an active fever since admission, although his white count is elevated. He is currently n.p.o. with sips with meds.     The patient reports that he was doing well from his recent discharge from [facility] for a Crohn's flareup until yesterday. He was discharged home on a 2-month taper of prednisone and was still on the 40 mg dose as well as continuing with the omeprazole and Bactrim that he was discharged on as well. The patient is currently taking Rinvok 45 mg daily and was previously on Entyvio. He is also been on Remicade, Humira, Lialda, sulfasalazine, Mercaptopurine, none of which the patient has been able to manage his symptoms effectively. He reports that usually the prednisone taper helps with a flare up, but it only seemed to help for less than 2 weeks this time. He is currently following with Dr Neo Melton for his Crohns treatment. The patient denies any diarrhea, constipation, straining, melena or bloody stools. Last BM: 12/4/23. Flatus: No.    Serology Upon Admission: (12/5/23) potassium 3.4, AST 12, lactic acid 2.3, WBCs 15.34, RBC 5.82, RDW 15.9. Patient denied any personal or family history of colon cancer or any family history of crohn's or ulcerative colitis. Tobacco/Vaping: Denied  ETOH: Denied  Marijuana: Denied  Illicit Drug Use: Denied    Meds: Protonix 40 mg daily and Zofran 4 mg 4 times daily as needed for nausea. NSAID Use: Denied    Imaging: (12/5/23) CT of the abdomen and pelvis with contrast: Distended loops of small bowel compatible with small bowel obstruction with a transition point likely within the central abdomen just to the right of midline. Suspected prior partial colectomy. Small ascites in the abdomen and mesentery. Heterogeneously enhancing mass within the right kidney most concerning for renal cell carcinoma. Endoscopy History: EGD: (None):     COLONOSCOPY: (11/24/23): Ulcer in the colon with multiple regions of severe colitis. REVIEW OF SYSTEMS:    CONSTITUTIONAL: Denies any fever, chills, rigors, and weight loss. HEENT: No earache or tinnitus. Denies hearing loss or visual disturbances.   CARDIOVASCULAR: No chest pain or palpitations. RESPIRATORY: Denies any cough, hemoptysis, shortness of breath or dyspnea on exertion. GASTROINTESTINAL: As noted in the History of Present Illness. GENITOURINARY: No problems with urination. Denies any hematuria or dysuria. NEUROLOGIC: No dizziness or vertigo, denies headaches. MUSCULOSKELETAL: Denies any muscle or joint pain. SKIN: Denies skin rashes or itching. ENDOCRINE: Denies excessive thirst. Denies intolerance to heat or cold. PSYCHOSOCIAL: Denies depression or anxiety. Denies any recent memory loss. Historical Information   Past Medical History:   Diagnosis Date    Crohn's disease [facility])      Past Surgical History:   Procedure Laterality Date    COLECTOMY      right hemicolectomy? 2012     Social History   Social History     Substance and Sexual Activity   Alcohol Use Never     Social History     Substance and Sexual Activity   Drug Use Never     Social History     Tobacco Use   Smoking Status Never   Smokeless Tobacco Never     History reviewed. No pertinent family history.     Meds/Allergies     Medications Prior to Admission   Medication    amLODIPine (NORVASC) 2.5 mg tablet    omeprazole (PriLOSEC) 40 MG capsule    predniSONE 10 mg tablet    sulfamethoxazole-trimethoprim (BACTRIM) 400-80 mg per tablet    Upadacitinib ER (Rinvoq) 45 MG TB24    fluticasone (FLONASE) 50 mcg/act nasal spray     Current Facility-Administered Medications   Medication Dose Route Frequency    enoxaparin (LOVENOX) subcutaneous injection 40 mg  40 mg Subcutaneous Daily    HYDROmorphone (DILAUDID) injection 0.5 mg  0.5 mg Intravenous Q3H PRN    iohexol (OMNIPAQUE) 240 MG/ML solution 50 mL  50 mL Oral Once in imaging    labetalol (NORMODYNE) injection 10 mg  10 mg Intravenous Q4H PRN    morphine injection 2 mg  2 mg Intravenous Q4H PRN    Or    morphine injection 4 mg  4 mg Intravenous Q4H PRN    multi-electrolyte (PLASMALYTE-A/ISOLYTE-S PH 7.4) IV solution  100 mL/hr Intravenous Continuous ondansetron (ZOFRAN) injection 4 mg  4 mg Intravenous Q6H PRN    pantoprazole (PROTONIX) injection 40 mg  40 mg Intravenous Q24H [facility]       Allergies   Allergen Reactions    Tetanus-Diphth-Acell Pertussis Other (See Comments)     Swelling, fever and redness at injection site           Objective     Blood pressure 157/89, pulse 66, temperature 98 °F (36.7 °C), resp. rate 18, height 6' 1" (1.854 m), weight 87.2 kg (192 lb 3.2 oz), SpO2 97 %. Body mass index is 25.36 kg/m². Intake/Output Summary (Last 24 hours) at 12/6/2023 0731  Last data filed at 12/6/2023 0602  Gross per 24 hour   Intake 1030 ml   Output 2025 ml   Net -995 ml         PHYSICAL EXAM:      General Appearance:   Alert, cooperative, no distress   HEENT:   Normocephalic, atraumatic, anicteric. Neck:  Supple, symmetrical, trachea midline   Lungs:   Clear to auscultation bilaterally; no rales, rhonchi or wheezing; respirations unlabored    Heart[de-identified]   Regular rate and rhythm; no murmur, rub, or gallop.    Abdomen:   Soft, non-tender, non-distended; normal bowel sounds; no masses, no organomegaly    Genitalia:   Deferred    Rectal:   Deferred    Extremities:  No cyanosis, clubbing or edema    Pulses:  2+ and symmetric all extremities    Skin:  No jaundice, rashes, or lesions    Lymph nodes:  No palpable cervical lymphadenopathy        Lab Results:   Admission on 12/05/2023   Component Date Value    WBC 12/05/2023 15.34 (H)     RBC 12/05/2023 5.82 (H)     Hemoglobin 12/05/2023 15.6     Hematocrit 12/05/2023 47.9     MCV 12/05/2023 82     MCH 12/05/2023 26.8     MCHC 12/05/2023 32.6     RDW 12/05/2023 15.9 (H)     MPV 12/05/2023 8.9     Platelets 26/46/4991 363     nRBC 12/05/2023 0     Neutrophils Relative 12/05/2023 83 (H)     Immat GRANS % 12/05/2023 2     Lymphocytes Relative 12/05/2023 8 (L)     Monocytes Relative 12/05/2023 6     Eosinophils Relative 12/05/2023 0     Basophils Relative 12/05/2023 1     Neutrophils Absolute 12/05/2023 12.72 (H) Immature Grans Absolute 12/05/2023 0.37 (H)     Lymphocytes Absolute 12/05/2023 1.29     Monocytes Absolute 12/05/2023 0.84     Eosinophils Absolute 12/05/2023 0.03     Basophils Absolute 12/05/2023 0.09     Sodium 12/05/2023 138     Potassium 12/05/2023 3.4 (L)     Chloride 12/05/2023 99     CO2 12/05/2023 28     ANION GAP 12/05/2023 11     BUN 12/05/2023 19     Creatinine 12/05/2023 1.01     Glucose 12/05/2023 102     Calcium 12/05/2023 9.8     AST 12/05/2023 12 (L)     ALT 12/05/2023 28     Alkaline Phosphatase 12/05/2023 74     Total Protein 12/05/2023 6.4     Albumin 12/05/2023 4.5     Total Bilirubin 12/05/2023 0.82     eGFR 12/05/2023 87     Lipase 12/05/2023 13     Sed Rate 12/05/2023 <1     CRP 12/05/2023 2.4     Color, UA 12/06/2023 Yellow     Clarity, UA 12/06/2023 Clear     Specific Gravity, UA 12/06/2023 1.020     pH, UA 12/06/2023 7.0     Leukocytes, UA 12/06/2023 Negative     Nitrite, UA 12/06/2023 Negative     Protein, UA 12/06/2023 Negative     Glucose, UA 12/06/2023 Negative     Ketones, UA 12/06/2023 Negative     Urobilinogen, UA 12/06/2023 1.0     Bilirubin, UA 12/06/2023 Negative     Occult Blood, UA 12/06/2023 Negative     LACTIC ACID 12/05/2023 2.3 (HH)     LACTIC ACID 12/05/2023 1.6     Sodium 12/06/2023 135     Potassium 12/06/2023 4.0     Chloride 12/06/2023 99     CO2 12/06/2023 28     ANION GAP 12/06/2023 8     BUN 12/06/2023 15     Creatinine 12/06/2023 0.90     Glucose 12/06/2023 101     Calcium 12/06/2023 8.6     AST 12/06/2023 10 (L)     ALT 12/06/2023 20     Alkaline Phosphatase 12/06/2023 73     Total Protein 12/06/2023 5.2 (L)     Albumin 12/06/2023 3.6     Total Bilirubin 12/06/2023 0.95     eGFR 12/06/2023 100     Magnesium 12/06/2023 2.5     WBC 12/06/2023 12.66 (H)     RBC 12/06/2023 5.37     Hemoglobin 12/06/2023 14.4     Hematocrit 12/06/2023 44.5     MCV 12/06/2023 83     MCH 12/06/2023 26.8     MCHC 12/06/2023 32.4     RDW 12/06/2023 15.9 (H)     MPV 12/06/2023 9.3 Platelets 61/70/9717 286     nRBC 12/06/2023 0     Neutrophils Relative 12/06/2023 85 (H)     Immat GRANS % 12/06/2023 1     Lymphocytes Relative 12/06/2023 6 (L)     Monocytes Relative 12/06/2023 6     Eosinophils Relative 12/06/2023 1     Basophils Relative 12/06/2023 1     Neutrophils Absolute 12/06/2023 10.85 (H)     Immature Grans Absolute 12/06/2023 0.16     Lymphocytes Absolute 12/06/2023 0.81     Monocytes Absolute 12/06/2023 0.70     Eosinophils Absolute 12/06/2023 0.08     Basophils Absolute 12/06/2023 0.06     Procalcitonin 12/06/2023 <0.05        Imaging Studies: I have personally reviewed pertinent imaging studies.

## 2023-12-06 NOTE — UTILIZATION REVIEW
Initial Clinical Review    Admission: Date/Time/Statement:   Admission Orders (From admission, onward)       Ordered        12/05/23 0934  INPATIENT ADMISSION  Once                          Orders Placed This Encounter   Procedures    INPATIENT ADMISSION     Standing Status:   Standing     Number of Occurrences:   1     Order Specific Question:   Level of Care     Answer:   Med Surg [16]     Order Specific Question:   Estimated length of stay     Answer:   More than 2 Midnights     Order Specific Question:   Certification     Answer:   I certify that inpatient services are medically necessary for this patient for a duration of greater than two midnights. See H&P and MD Progress Notes for additional information about the patient's course of treatment. ED Arrival Information       Expected   -    Arrival   12/5/2023 05:57    Acuity   Urgent              Means of arrival   Ambulance    Escorted by   [facility]    Service   Hospitalist    Admission type   Emergency              Arrival complaint   abd pain             Chief Complaint   Patient presents with    Abdominal Pain     Ems arrival, lower abdominal cramping x 1 day, hx of chron's disease       Initial Presentation: 50 y.o. male presents to ed on 12-5-23 via ems from home for evaluation and treatment of severe lower abdominal cramping pain, and non bloody vomiting after eating  for 1 day Recently treated for chron's flare at another hospital and discharged on oral prednisone and rinvoq r5 mg daily. He is compliant. Colonoscopy showed severe colitis with ulcer in colon, imaging shows possible SBO , enteritis and colitis. PMHX: chron's disease, R hemicolectomy with anastomosis 2012. Clinical assessment significant for pain 10/10, hypertension, abdominal tenderness BL lower quadrants and distension. LA 2.3, WBC 15.34.   Imaging shows distended small bowel loops compatible with SBO, ascites, heterogenous enhancing mass in R kidney concerning for renal cell cancer. Initially treated with iv dilaudid x2, iv zofran x1, iv Kcl 20 meq, iv multi electrolyte 100/hr, iv labetalol x1, iv mso4 x1. Admit to inpatient med surg for SBO, chron's disease, renal mass. Plan includes GI consult, Ngt, NPO, Urology consult. Surgery recommends conservative management. Continue iv fluids and start iv protonix. Date: 12-6-23    Day 2: inpatient med surg    Start iv solumedrol q8 hr. Continue iv fluids 100/hr and iv protonix. NPO. Continue NGT to low intermittent suction. Monitor intake / output. Collect urine culture, Mag, CBC diff, procalcitonin and CMP. GI consult 12-6: NGT output is brown liquid. Abdomen is softly distended and tender in all quadrants with guarding. Remains nauseous. Start iv solumedrol, continue NGT, continue NPO, restart rinvoq 45 mg daily and observe off antibiotic. Urology consult : reviewed imaging with enhancing mass in right kidney. Plan outpatient follow up.       ED Triage Vitals [12/05/23 0558]   97.8 °F (36.6 °C) 61 18 (!) 202/113 99 %      Temporal Monitor         10 - Worst Possible Pain          12/05/23 87.2 kg (192 lb 3.2 oz)     Additional Vital Signs:       Date/Time Temp Pulse Resp BP MAP (mmHg) SpO2 O2 Device   12/05/23 23:43:30 98 °F (36.7 °C) 66 18 157/89 112 97 % --   12/05/23 20:20:47 97.4 °F (36.3 °C) Abnormal  72 -- 128/89 102 93 % --   12/05/23 16:29:11 97.8 °F (36.6 °C) -- 18 161/106 Abnormal  124 -- --   12/05/23 16:28:28 -- -- 18 161/106 Abnormal  124 -- --   12/05/23 1301 -- 69 12 142/89 109 -- --   12/05/23 1131 -- 60 17 178/106 Abnormal  137 95 % --   12/05/23 0900 -- 64 17 179/109 Abnormal  138 94 % --   12/05/23 0830 -- 67 17 177/104 Abnormal  133 95 % --   12/05/23 0702 -- 71 -- 162/89 119 95 % --   12/05/23 0630 -- 66 18 168/101 Abnormal  129 93 % None (Room air)   12/05/23 0622 -- -- -- -- -- -- None (Room air)   12/05/23 0558 97.8 °F (36.6 °C) 61 18 202/113 Abnormal  149 99 % None (Room air) Pertinent Labs/Diagnostic Test Results:     XR chest 1 view portable   Final (12/06 0644)      No acute cardiopulmonary disease. NG tube terminating in the stomach. CT abdomen pelvis with contrast   Final (12/05 0831)      Distended loops of small bowel compatible with small bowel obstruction with a transition point likely within the central abdomen just to the right of midline. Suspected prior partial colectomy. Small ascites in the abdomen and mesentery. Heterogeneously enhancing mass within the right kidney most concerning for renal cell carcinoma. XR chest 1 view portable   Final (12/05 1538)      No acute cardiopulmonary disease.             Results from last 7 days   Lab Units 12/06/23  0435 12/05/23 0613   WBC Thousand/uL 12.66* 15.34*   HEMOGLOBIN g/dL 14.4 15.6   HEMATOCRIT % 44.5 47.9   PLATELETS Thousands/uL 286 363   NEUTROS ABS Thousands/µL 10.85* 12.72*         Results from last 7 days   Lab Units 12/06/23  0435 12/05/23 0613   SODIUM mmol/L 135 138   POTASSIUM mmol/L 4.0 3.4*   CHLORIDE mmol/L 99 99   CO2 mmol/L 28 28   ANION GAP mmol/L 8 11   BUN mg/dL 15 19   CREATININE mg/dL 0.90 1.01   EGFR ml/min/1.73sq m 100 87   CALCIUM mg/dL 8.6 9.8   MAGNESIUM mg/dL 2.5  --      Results from last 7 days   Lab Units 12/06/23  0435 12/05/23 0613   AST U/L 10* 12*   ALT U/L 20 28   ALK PHOS U/L 73 74   TOTAL PROTEIN g/dL 5.2* 6.4   ALBUMIN g/dL 3.6 4.5   TOTAL BILIRUBIN mg/dL 0.95 0.82         Results from last 7 days   Lab Units 12/06/23  0435 12/05/23 0613   GLUCOSE RANDOM mg/dL 101 102       Results from last 7 days   Lab Units 12/06/23 0435   PROCALCITONIN ng/ml <0.05     Results from last 7 days   Lab Units 12/05/23  1317 12/05/23 0613   LACTIC ACID mmol/L 1.6 2.3*           Results from last 7 days   Lab Units 12/05/23 0613   LIPASE u/L 13     Results from last 7 days   Lab Units 12/05/23 0613   CRP mg/L 2.4   SED RATE mm/hour <1             Results from last 7 days   Lab Units 12/06/23  0348   CLARITY UA  Clear   COLOR UA  Yellow   SPEC GRAV UA  1.020   PH UA  7.0   GLUCOSE UA mg/dl Negative   KETONES UA mg/dl Negative   BLOOD UA  Negative   PROTEIN UA mg/dl Negative   NITRITE UA  Negative   BILIRUBIN UA  Negative   UROBILINOGEN UA E.U./dl 1.0   LEUKOCYTES UA  Negative         ED Treatment:   Medication Administration from 12/05/2023 0557 to 12/05/2023 1627         Date/Time Order Dose Route Action     12/05/2023 0618 EST HYDROmorphone (DILAUDID) injection 0.5 mg 0.5 mg Intravenous Given     12/05/2023 0617 EST ondansetron (ZOFRAN) injection 4 mg 4 mg Intravenous Given     12/05/2023 0616 EST sodium chloride 0.9 % bolus 1,000 mL 1,000 mL Intravenous New Bag     12/05/2023 0732 EST potassium chloride 20 mEq IVPB (premix) -- Intravenous Rate/Dose Change     12/05/2023 0717 EST potassium chloride 20 mEq IVPB (premix) 20 mEq Intravenous New Bag     12/05/2023 3828 EST HYDROmorphone (DILAUDID) injection 1 mg 1 mg Intravenous Given     12/05/2023 1302 EST multi-electrolyte (PLASMALYTE-A/ISOLYTE-S PH 7.4) IV solution 100 mL/hr Intravenous New Bag     12/05/2023 1254 EST enoxaparin (LOVENOX) subcutaneous injection 40 mg 40 mg Subcutaneous Given     12/05/2023 1256 EST labetalol (NORMODYNE) injection 10 mg 10 mg Intravenous Given     12/05/2023 1538 EST morphine injection 4 mg 4 mg Intravenous Given          Past Medical History:   Diagnosis Date    Crohn's disease ([address])      Present on Admission:   Right kidney mass   SBO (small bowel obstruction) (HCC)   Crohn's colitis, other complication ([address])   Essential (primary) hypertension      Admitting Diagnosis:     SBO (small bowel obstruction) ([address]) [K56.609]  Abdominal pain [R10.9]  Right kidney mass [N28.89]  Crohn's colitis, other complication ([address]) [U42.399]    Age/Sex: 50 y.o. male    Scheduled Medications:    enoxaparin, 40 mg, Subcutaneous, Daily  methylPREDNISolone sodium succinate, 20 mg, Intravenous, Q8H [address]  pantoprazole, 40 mg, Intravenous, Q24H [address]      Continuous IV Infusions:  multi-electrolyte, 100 mL/hr, Intravenous, Continuous      PRN Meds:  HYDROmorphone, 0.5 mg, Intravenous, Q3H PRN  iohexol, 50 mL, Oral, Once in imaging  labetalol, 10 mg, Intravenous, Q4H PRN  morphine injection, 2 mg, Intravenous, Q4H PRN   Or  morphine injection, 4 mg, Intravenous, Q4H PRN  ondansetron, 4 mg, Intravenous, Q6H PRN        IP CONSULT TO ACUTE CARE SURGERY  IP CONSULT TO UROLOGY  IP CONSULT TO GASTROENTEROLOGY    Network Utilization Review Department  ATTENTION: Please call with any questions or concerns to [phone] and carefully listen to the prompts so that you are directed to the right person. All voicemails are confidential.   For Discharge needs, contact Care Management DC Support Team at [phone] opt. [phone]  Send all requests for admission clinical reviews, approved or denied determinations and any other requests to dedicated fax number below belonging to the campus where the patient is receiving treatment.  List of dedicated fax numbers for the Facilities:  [place] DENIALS (Administrative/Medical Necessity) [phone]   DISCHARGE SUPPORT TEAM (NETWORK) [id number] (Maternity/NICU/Pediatrics) [phone]   [address] [facility] [phone]   [address] [facility] [address] [address] [facility] [address] [facility] [address] [facility] [phone]

## 2023-12-06 NOTE — PLAN OF CARE
Problem: PAIN - ADULT  Goal: Verbalizes/displays adequate comfort level or baseline comfort level  Description: Interventions:  - Encourage patient to monitor pain and request assistance  - Assess pain using appropriate pain scale  - Administer analgesics based on type and severity of pain and evaluate response  - Implement non-pharmacological measures as appropriate and evaluate response  - Consider cultural and social influences on pain and pain management  - Notify physician/advanced practitioner if interventions unsuccessful or patient reports new pain  Outcome: Progressing     Problem: INFECTION - ADULT  Goal: Absence or prevention of progression during hospitalization  Description: INTERVENTIONS:  - Assess and monitor for signs and symptoms of infection  - Monitor lab/diagnostic results  - Monitor all insertion sites, i.e. indwelling lines, tubes, and drains  - Monitor endotracheal if appropriate and nasal secretions for changes in amount and color  - [place] appropriate cooling/warming therapies per order  - Administer medications as ordered  - Instruct and encourage patient and family to use good hand hygiene technique  - Identify and instruct in appropriate isolation precautions for identified infection/condition  Outcome: Progressing  Goal: Absence of fever/infection during neutropenic period  Description: INTERVENTIONS:  - Monitor WBC    Outcome: Progressing     Problem: SAFETY ADULT  Goal: Patient will remain free of falls  Description: INTERVENTIONS:  - Educate patient/family on patient safety including physical limitations  - Instruct patient to call for assistance with activity   - Consult OT/PT to assist with strengthening/mobility   - Keep Call bell within reach  - Keep bed low and locked with side rails adjusted as appropriate  - Keep care items and personal belongings within reach  - Initiate and maintain comfort rounds  - Make Fall Risk Sign visible to staff  - Offer Toileting every 2 Hours, in advance of need  - Initiate/Maintain fall alarm  - Obtain necessary fall risk management equipment: non-skid footwear  - Apply yellow socks and bracelet for high fall risk patients  - Consider moving patient to room near nurses station  Outcome: Progressing  Goal: Maintain or return to baseline ADL function  Description: INTERVENTIONS:  -  Assess patient's ability to carry out ADLs; assess patient's baseline for ADL function and identify physical deficits which impact ability to perform ADLs (bathing, care of mouth/teeth, toileting, grooming, dressing, etc.)  - Assess/evaluate cause of self-care deficits   - Assess range of motion  - Assess patient's mobility; develop plan if impaired  - Assess patient's need for assistive devices and provide as appropriate  - Encourage maximum independence but intervene and supervise when necessary  - Involve family in performance of ADLs  - Assess for home care needs following discharge   - Consider OT consult to assist with ADL evaluation and planning for discharge  - Provide patient education as appropriate  Outcome: Progressing  Goal: Maintains/Returns to pre admission functional level  Description: INTERVENTIONS:  - Perform AM-PAC 6 Click Basic Mobility/ Daily Activity assessment daily.  - Set and communicate daily mobility goal to care team and patient/family/caregiver. - Collaborate with rehabilitation services on mobility goals if consulted  - Perform Range of Motion 3 times a day. - Reposition patient every 2 hours.   - Dangle patient 3 times a day  - Stand patient 3 times a day  - Ambulate patient 3 times a day  - Out of bed to chair 3 times a day   - Out of bed for meals 3 times a day  - Out of bed for toileting  - Record patient progress and toleration of activity level   Outcome: Progressing     Problem: DISCHARGE PLANNING  Goal: Discharge to home or other facility with appropriate resources  Description: INTERVENTIONS:  - Identify barriers to discharge w/patient and caregiver  - Arrange for needed discharge resources and transportation as appropriate  - Identify discharge learning needs (meds, wound care, etc.)  - Arrange for interpretive services to assist at discharge as needed  - Refer to Case Management Department for coordinating discharge planning if the patient needs post-hospital services based on physician/advanced practitioner order or complex needs related to functional status, cognitive ability, or social support system  Outcome: Progressing     Problem: Knowledge Deficit  Goal: Patient/family/caregiver demonstrates understanding of disease process, treatment plan, medications, and discharge instructions  Description: Complete learning assessment and assess knowledge base.   Interventions:  - Provide teaching at level of understanding  - Provide teaching via preferred learning methods  Outcome: Progressing     Problem: GASTROINTESTINAL - ADULT  Goal: Minimal or absence of nausea and/or vomiting  Description: INTERVENTIONS:  - Administer IV fluids if ordered to ensure adequate hydration  - Maintain NPO status until nausea and vomiting are resolved  - Nasogastric tube if ordered  - Administer ordered antiemetic medications as needed  - Provide nonpharmacologic comfort measures as appropriate  - Advance diet as tolerated, if ordered  - Consider nutrition services referral to assist patient with adequate nutrition and appropriate food choices  Outcome: Progressing  Goal: Maintains or returns to baseline bowel function  Description: INTERVENTIONS:  - Assess bowel function  - Encourage oral fluids to ensure adequate hydration  - Administer IV fluids if ordered to ensure adequate hydration  - Administer ordered medications as needed  - Encourage mobilization and activity  - Consider nutritional services referral to assist patient with adequate nutrition and appropriate food choices  Outcome: Progressing  Goal: Maintains adequate nutritional intake  Description: INTERVENTIONS:  - Monitor percentage of each meal consumed  - Identify factors contributing to decreased intake, treat as appropriate  - Assist with meals as needed  - Monitor I&O, weight, and lab values if indicated  - Obtain nutrition services referral as needed  Outcome: Progressing  Goal: Oral mucous membranes remain intact  Description: INTERVENTIONS  - Assess oral mucosa and hygiene practices  - Implement preventative oral hygiene regimen  - Implement oral medicated treatments as ordered  - Initiate Nutrition services referral as needed  Outcome: Progressing     Problem: GENITOURINARY - ADULT  Goal: Maintains or returns to baseline urinary function  Description: INTERVENTIONS:  - Assess urinary function  - Encourage oral fluids to ensure adequate hydration if ordered  - Administer IV fluids as ordered to ensure adequate hydration  - Administer ordered medications as needed  - Offer frequent toileting  - Follow urinary retention protocol if ordered  Outcome: Progressing  Goal: Absence of urinary retention  Description: INTERVENTIONS:  - Assess patient’s ability to void and empty bladder  - Monitor I/O  - Bladder scan as needed  - Discuss with physician/AP medications to alleviate retention as needed  - Discuss catheterization for long term situations as appropriate  Outcome: Progressing     Problem: METABOLIC, FLUID AND ELECTROLYTES - ADULT  Goal: Electrolytes maintained within normal limits  Description: INTERVENTIONS:  - Monitor labs and assess patient for signs and symptoms of electrolyte imbalances  - Administer electrolyte replacement as ordered  - Monitor response to electrolyte replacements, including repeat lab results as appropriate  - Instruct patient on fluid and nutrition as appropriate  Outcome: Progressing  Goal: Fluid balance maintained  Description: INTERVENTIONS:  - Monitor labs   - Monitor I/O and WT  - Instruct patient on fluid and nutrition as appropriate  - Assess for signs & symptoms of volume excess or deficit  Outcome: Progressing  Goal: Glucose maintained within target range  Description: INTERVENTIONS:  - Monitor Blood Glucose as ordered  - Assess for signs and symptoms of hyperglycemia and hypoglycemia  - Administer ordered medications to maintain glucose within target range  - Assess nutritional intake and initiate nutrition service referral as needed  Outcome: Progressing     Problem: HEMATOLOGIC - ADULT  Goal: Maintains hematologic stability  Description: INTERVENTIONS  - Assess for signs and symptoms of bleeding or hemorrhage  - Monitor labs  - Administer supportive blood products/factors as ordered and appropriate  Outcome: Progressing     Problem: MUSCULOSKELETAL - ADULT  Goal: Maintain or return mobility to safest level of function  Description: INTERVENTIONS:  - Assess patient's ability to carry out ADLs; assess patient's baseline for ADL function and identify physical deficits which impact ability to perform ADLs (bathing, care of mouth/teeth, toileting, grooming, dressing, etc.)  - Assess/evaluate cause of self-care deficits   - Assess range of motion  - Assess patient's mobility  - Assess patient's need for assistive devices and provide as appropriate  - Encourage maximum independence but intervene and supervise when necessary  - Involve family in performance of ADLs  - Assess for home care needs following discharge   - Consider OT consult to assist with ADL evaluation and planning for discharge  - Provide patient education as appropriate  Outcome: Progressing  Goal: Maintain proper alignment of affected body part  Description: INTERVENTIONS:  - Support, maintain and protect limb and body alignment  - Provide patient/ family with appropriate education  Outcome: Progressing

## 2023-12-06 NOTE — ASSESSMENT & PLAN NOTE
PMH of Crohn's disease, complicated by fibrostenotic region, s/p R hemicolectomy w anastomosis 2012,   H/o recent admission for Crohn's flare,   - CT scan 11/22/2023: possible SBO, enteritis, colitis. - Colonoscopy 11/24/2023: ulcer in colon, regions of severe colitis. - Was discharged on prednisone taper, Rinvoq 45 mg daily, states he is compliant. GI is on board, input appreciated    Hold antibiotics for now. Restart Rinvoq 45 mg daily if on formulary. (Patient has some supplies of Rinvoq with him). Continue n.p.o. and NG tube, monitor NG tube output per shift. Continue to maintain a large bore IV. Continue to trend blood work and vital signs, specifically monitoring WBCs and any signs of infection. Continue with serial abdominal exams. Continue to monitor stool output for any overt signs of GI bleeding. Start IV Solu-Medrol 20 mg 3 times daily to treat Crohn's flareup.and can convert to p.o. once patient's NG tube is out and is tolerating p.o. Continue PPI as ordered. Continue PRN antiemetics as ordered. Continue supportive care.

## 2023-12-07 ENCOUNTER — APPOINTMENT (INPATIENT)
Dept: RADIOLOGY | Facility: HOSPITAL | Age: 48
DRG: 245 | End: 2023-12-07
Payer: COMMERCIAL

## 2023-12-07 LAB
ALBUMIN SERPL BCP-MCNC: 3.8 G/DL (ref 3.5–5)
ALP SERPL-CCNC: 67 U/L (ref 34–104)
ALT SERPL W P-5'-P-CCNC: 21 U/L (ref 7–52)
ANION GAP SERPL CALCULATED.3IONS-SCNC: 10 MMOL/L
AST SERPL W P-5'-P-CCNC: 11 U/L (ref 13–39)
BASOPHILS # BLD AUTO: 0.03 THOUSANDS/ÂΜL (ref 0–0.1)
BASOPHILS NFR BLD AUTO: 0 % (ref 0–1)
BILIRUB SERPL-MCNC: 1.19 MG/DL (ref 0.2–1)
BUN SERPL-MCNC: 13 MG/DL (ref 5–25)
CALCIUM SERPL-MCNC: 8.9 MG/DL (ref 8.4–10.2)
CHLORIDE SERPL-SCNC: 98 MMOL/L (ref 96–108)
CO2 SERPL-SCNC: 26 MMOL/L (ref 21–32)
CREAT SERPL-MCNC: 0.93 MG/DL (ref 0.6–1.3)
EOSINOPHIL # BLD AUTO: 0 THOUSAND/ÂΜL (ref 0–0.61)
EOSINOPHIL NFR BLD AUTO: 0 % (ref 0–6)
ERYTHROCYTE [DISTWIDTH] IN BLOOD BY AUTOMATED COUNT: 15.5 % (ref 11.6–15.1)
GFR SERPL CREATININE-BSD FRML MDRD: 96 ML/MIN/1.73SQ M
GLUCOSE SERPL-MCNC: 125 MG/DL (ref 65–140)
HCT VFR BLD AUTO: 45.1 % (ref 36.5–49.3)
HGB BLD-MCNC: 14.8 G/DL (ref 12–17)
IMM GRANULOCYTES # BLD AUTO: 0.14 THOUSAND/UL (ref 0–0.2)
IMM GRANULOCYTES NFR BLD AUTO: 1 % (ref 0–2)
LYMPHOCYTES # BLD AUTO: 0.26 THOUSANDS/ÂΜL (ref 0.6–4.47)
LYMPHOCYTES NFR BLD AUTO: 2 % (ref 14–44)
MCH RBC QN AUTO: 27.4 PG (ref 26.8–34.3)
MCHC RBC AUTO-ENTMCNC: 32.8 G/DL (ref 31.4–37.4)
MCV RBC AUTO: 84 FL (ref 82–98)
MONOCYTES # BLD AUTO: 0.39 THOUSAND/ÂΜL (ref 0.17–1.22)
MONOCYTES NFR BLD AUTO: 4 % (ref 4–12)
NEUTROPHILS # BLD AUTO: 10.25 THOUSANDS/ÂΜL (ref 1.85–7.62)
NEUTS SEG NFR BLD AUTO: 93 % (ref 43–75)
NRBC BLD AUTO-RTO: 0 /100 WBCS
PLATELET # BLD AUTO: 347 THOUSANDS/UL (ref 149–390)
PMV BLD AUTO: 9 FL (ref 8.9–12.7)
POTASSIUM SERPL-SCNC: 4.6 MMOL/L (ref 3.5–5.3)
PROT SERPL-MCNC: 6 G/DL (ref 6.4–8.4)
RBC # BLD AUTO: 5.4 MILLION/UL (ref 3.88–5.62)
SODIUM SERPL-SCNC: 134 MMOL/L (ref 135–147)
WBC # BLD AUTO: 11.07 THOUSAND/UL (ref 4.31–10.16)

## 2023-12-07 PROCEDURE — NC001 PR NO CHARGE

## 2023-12-07 PROCEDURE — 80053 COMPREHEN METABOLIC PANEL: CPT

## 2023-12-07 PROCEDURE — 99233 SBSQ HOSP IP/OBS HIGH 50: CPT | Performed by: SURGERY

## 2023-12-07 PROCEDURE — C9113 INJ PANTOPRAZOLE SODIUM, VIA: HCPCS | Performed by: PHYSICIAN ASSISTANT

## 2023-12-07 PROCEDURE — 74018 RADEX ABDOMEN 1 VIEW: CPT

## 2023-12-07 PROCEDURE — 99233 SBSQ HOSP IP/OBS HIGH 50: CPT | Performed by: FAMILY MEDICINE

## 2023-12-07 PROCEDURE — 85025 COMPLETE CBC W/AUTO DIFF WBC: CPT

## 2023-12-07 RX ADMIN — METHYLPREDNISOLONE SODIUM SUCCINATE 20 MG: 40 INJECTION, POWDER, FOR SOLUTION INTRAMUSCULAR; INTRAVENOUS at 13:25

## 2023-12-07 RX ADMIN — HYDROMORPHONE HYDROCHLORIDE 0.5 MG: 1 INJECTION, SOLUTION INTRAMUSCULAR; INTRAVENOUS; SUBCUTANEOUS at 19:37

## 2023-12-07 RX ADMIN — MORPHINE SULFATE 4 MG: 4 INJECTION, SOLUTION INTRAMUSCULAR; INTRAVENOUS at 13:25

## 2023-12-07 RX ADMIN — MORPHINE SULFATE 4 MG: 4 INJECTION, SOLUTION INTRAMUSCULAR; INTRAVENOUS at 08:02

## 2023-12-07 RX ADMIN — METHYLPREDNISOLONE SODIUM SUCCINATE 20 MG: 40 INJECTION, POWDER, FOR SOLUTION INTRAMUSCULAR; INTRAVENOUS at 21:01

## 2023-12-07 RX ADMIN — HYDROMORPHONE HYDROCHLORIDE 0.5 MG: 1 INJECTION, SOLUTION INTRAMUSCULAR; INTRAVENOUS; SUBCUTANEOUS at 23:48

## 2023-12-07 RX ADMIN — METHYLPREDNISOLONE SODIUM SUCCINATE 20 MG: 40 INJECTION, POWDER, FOR SOLUTION INTRAMUSCULAR; INTRAVENOUS at 05:00

## 2023-12-07 RX ADMIN — SODIUM CHLORIDE, SODIUM GLUCONATE, SODIUM ACETATE, POTASSIUM CHLORIDE, MAGNESIUM CHLORIDE, SODIUM PHOSPHATE, DIBASIC, AND POTASSIUM PHOSPHATE 100 ML/HR: .53; .5; .37; .037; .03; .012; .00082 INJECTION, SOLUTION INTRAVENOUS at 23:07

## 2023-12-07 RX ADMIN — HYDROMORPHONE HYDROCHLORIDE 0.5 MG: 1 INJECTION, SOLUTION INTRAMUSCULAR; INTRAVENOUS; SUBCUTANEOUS at 04:03

## 2023-12-07 RX ADMIN — DIATRIZOATE MEGLUMINE AND DIATRIZOATE SODIUM 120 ML: 660; 100 LIQUID ORAL; RECTAL at 09:00

## 2023-12-07 RX ADMIN — SODIUM CHLORIDE, SODIUM GLUCONATE, SODIUM ACETATE, POTASSIUM CHLORIDE, MAGNESIUM CHLORIDE, SODIUM PHOSPHATE, DIBASIC, AND POTASSIUM PHOSPHATE 100 ML/HR: .53; .5; .37; .037; .03; .012; .00082 INJECTION, SOLUTION INTRAVENOUS at 11:10

## 2023-12-07 RX ADMIN — SODIUM CHLORIDE, SODIUM GLUCONATE, SODIUM ACETATE, POTASSIUM CHLORIDE, MAGNESIUM CHLORIDE, SODIUM PHOSPHATE, DIBASIC, AND POTASSIUM PHOSPHATE 100 ML/HR: .53; .5; .37; .037; .03; .012; .00082 INJECTION, SOLUTION INTRAVENOUS at 01:05

## 2023-12-07 RX ADMIN — PANTOPRAZOLE SODIUM 40 MG: 40 INJECTION, POWDER, LYOPHILIZED, FOR SOLUTION INTRAVENOUS at 08:01

## 2023-12-07 RX ADMIN — ENOXAPARIN SODIUM 40 MG: 40 INJECTION SUBCUTANEOUS at 08:01

## 2023-12-07 RX ADMIN — Medication 1 SPRAY: at 13:26

## 2023-12-07 RX ADMIN — ONDANSETRON 4 MG: 2 INJECTION INTRAMUSCULAR; INTRAVENOUS at 11:10

## 2023-12-07 RX ADMIN — MORPHINE SULFATE 4 MG: 4 INJECTION, SOLUTION INTRAMUSCULAR; INTRAVENOUS at 23:05

## 2023-12-07 NOTE — INCIDENTAL FINDINGS
The following findings require follow up:  Radiographic finding     Finding: CT scan abdomen pelvis with IV contrast 12/5/2023    KIDNEYS/URETERS: There is a heterogeneously enhancing mass measuring approximately 2.8 cm within the right kidney medially highly concerning for renal cell carcinoma. There are right renal cysts. There is a tiny cyst in the lower pole of the left kidney. No hydronephrosis or hydroureter. Follow up required: Outpatient follow-up with urology in the office after hospital discharge   Follow up should be done within 1-2 week(s)    Please notify the following clinician to assist with the follow up: Medicine primary service upon discharge to arrange follow-up as outpatient with urology in the office.     JERZY JamesC

## 2023-12-07 NOTE — PROGRESS NOTES
Progress Note - General Surgery   Herb Serna 50 y.o. male MRN: [id number]  Unit/Bed#: 413-01 Encounter: [id number]    Assessment:    Patient continues with same abdominal discomfort  NG tube to intermittent low wall suction    80-year-old male with Crohn's disease admitted with flare and associated small bowel obstruction. Unclear if this is related secondary to Crohn's versus intra-abdominal adhesions. Leukocytosis improved today at 11.07 (12.66 and 15.34)  BMP sodium 134 otherwise normal electrolytes. Creatinine 0.93. Total bilirubin 1.19 (0.95 and 0.82.)  Lactic acidosis present on admission now resolved. Abdominal exam reveals hypoactive bowel sounds throughout, nondistended. Midline surgical scar with a small incisional hernia at the top pole noted which is nontender. The abdomen is soft but he exhibits generalized tenderness in 4 quadrants without peritoneal signs. CT scan abdomen pelvis with IV contrast 12/5/2023 showed incidental finding of right medial kidney mass measuring 2.8 cm suspicious for renal cell carcinoma. Urology consultation was performed which recommended outpatient follow-up with urology in the office. Per Dr. Kenny Knowles, urologist who attested consultation 2 days ago:    "He does not require urgent urologic surgical intervention upon this admission furthermore, attempting kidney removal or partial nephrectomy in the setting of an acute small bowel obstruction is ill advised. He can see his urologist on an outpatient basis for further discussion of management of his renal mass."    Vital signs reviewed, afebrile. Plan: Will proceed with Gastrografin challenge study today. 120 mL of Gastrografin in 50 mL of water instilled via NG tube and then clamped. Obtain delayed films in 8 hours to see if the contrast makes it way to the colon.   Nursing instructed to leave NG tube clamped but in the event he develops nausea or vomiting then may reattach to wall suction. Patient started on IV steroids per GI, management of Crohn's flareup per gastroenterology recommendations  Leukocytosis improving, continue to trend a.m. labs. IV fluids for hydration  IV Protonix for GI acid suppression  Patient continues to be n.p.o.  NG tube to intermittent low wall suction  Serial abdominal exams  I&Os  Medical management direction the primary attending hospitalist service  Outpatient follow-up with urology after hospital discharge regarding finding on CT scan which demonstrated a right kidney mass. Subjective/Objective     Chief Complaint: Abdominal discomfort the same today. No fever. Dry mouth. Subjective: 49-year-old male admitted 2 days ago because of underlying Crohn's flare and associated small bowel obstruction. NG tube was placed and he remains on intermittent low wall suction. Generalized abdominal pain and discomfort same as it was yesterday. No bowel movement. He does not admit passing any flatus. Patient was started on IV steroids per GI. Plan to proceed with Gastrografin challenge study today.     Scheduled Meds:  Current Facility-Administered Medications   Medication Dose Route Frequency Provider Last Rate    enoxaparin  40 mg Subcutaneous Daily Galilea Pope MD      HYDROmorphone  0.5 mg Intravenous Q3H PRN Alexa Tena MD      iohexol  50 mL Oral Once in imaging Alexa Tena MD      labetalol  10 mg Intravenous Q4H PRN Alexa Tena MD      methylPREDNISolone sodium succinate  20 mg Intravenous Q8H [facility] HOME CELIA Ortiz      morphine injection  2 mg Intravenous Q4H PRN Galilea Pope MD      Or    morphine injection  4 mg Intravenous Q4H PRN Alexa Tena MD      multi-electrolyte  100 mL/hr Intravenous Continuous Galilea Poep  mL/hr (12/07/23 0105)    ondansetron  4 mg Intravenous Q6H PRN Alexa Tena MD      pantoprazole  40 mg Intravenous Q24H [facility] Mariaa Mcclellan PA-C Continuous Infusions:multi-electrolyte, 100 mL/hr, Last Rate: 100 mL/hr (12/07/23 0105)      PRN Meds:. HYDROmorphone    iohexol    labetalol    morphine injection **OR** morphine injection    ondansetron      Objective:     Blood pressure 122/73, pulse 58, temperature (!) 97.3 °F (36.3 °C), temperature source Oral, resp. rate 17, height 6' 1" (1.854 m), weight 87.2 kg (192 lb 3.2 oz), SpO2 94 %. ,Body mass index is 25.36 kg/m². Intake/Output Summary (Last 24 hours) at 12/7/2023 3126  Last data filed at 12/7/2023 0401  Gross per 24 hour   Intake 1000 ml   Output 1950 ml   Net -950 ml       Invasive Devices       Peripheral Intravenous Line  Duration             Peripheral IV 12/05/23 Right Antecubital 2 days              Drain  Duration             NG/OG/Enteral Tube Nasogastric 14 Fr Right nare 1 day                    Physical Exam:     Awake, chronically ill-appearing. No acute distress. Skin warm dry touch. No pallor or jaundice. ENT oral mucosa pink and moist.  Sclera white OU. Neck supple, trachea midline. Chest good inspiratory effort. Heart regular rate and rhythm. No murmur or gallop. Lungs clear to auscultation. Back no flank tenderness to percussion. Abdomen bowel sounds are heard though distant and hypoactive throughout 4 quadrants. Midline surgical scar. There is a 1 to 2 cm incisional hernia in the top pole of the midline. Abdomen for the most part is soft, he still exhibits generalized tenderness throughout without peritoneal signs. Extremities no calf tenderness, no peripheral edema or cyanosis. Ambulation not witnessed. Neurological cranial nerves appear symmetric. No tremor. Mental status normal.  Fully oriented. Speech clear. Lab, Imaging and other studies:I have personally reviewed pertinent lab results.   , CBC:   Lab Results   Component Value Date    WBC 11.07 (H) 12/07/2023    HGB 14.8 12/07/2023    HCT 45.1 12/07/2023    MCV 84 12/07/2023     12/07/2023 RBC 5.40 12/07/2023    MCH 27.4 12/07/2023    MCHC 32.8 12/07/2023    RDW 15.5 (H) 12/07/2023    MPV 9.0 12/07/2023    NRBC 0 12/07/2023   , CMP:   Lab Results   Component Value Date    SODIUM 134 (L) 12/07/2023    K 4.6 12/07/2023    CL 98 12/07/2023    CO2 26 12/07/2023    BUN 13 12/07/2023    CREATININE 0.93 12/07/2023    CALCIUM 8.9 12/07/2023    AST 11 (L) 12/07/2023    ALT 21 12/07/2023    ALKPHOS 67 12/07/2023    EGFR 96 12/07/2023     VTE Pharmacologic Prophylaxis: Enoxaparin (Lovenox)  VTE Mechanical Prophylaxis: sequential compression device    Elena Livingston PA-C

## 2023-12-07 NOTE — ASSESSMENT & PLAN NOTE
CT abdomen and pelvis 12/05/2023: Heterogeneously enhancing mass within the right kidney most concerning for renal cell carcinoma.    Urology consult appreciated  O/p f/u w urology in the office within 1-2 week(s) after d/c

## 2023-12-07 NOTE — PROGRESS NOTES
[address]  Progress Note  Name: Rachael Berger  MRN: [id number]  Unit/Bed#: 554-33 I Date of Admission: 12/5/2023   Date of Service: 12/7/2023 I Hospital Day: 2    Assessment/Plan   * SBO (small bowel obstruction) ([address])  Assessment & Plan  Hx of SBO secondary to Crohn' disesase  PMH of Crohn's disease, complicated by fibrostenotic region, s/p R hemicolectomy w anastomosis 2012,     Presented to the ER for worsening abdominal pain, nausea, vomiting since last night after eating. ER course:  IVF, pain ctrl, NPO, NG tube    CT scan 11/05/2023: Distended loops of small bowel compatible with small bowel obstruction. Prior partial colectomy. Small ascites in the abdomen and mesentery. Per surgery - "No acute surgical intervention warranted at this time"    Afebrile, V/S stable, /73  Potassium normalized, 4.6  Lactic acidosis resolved  Leukocytosis, WBC 11.07, trending down/improving from 12.66 yesterday     Plan:  Gastrografin challenge study   IVF  NG tube, suction  NPO  Pain ctrl  Antiemetics PRN  IV pantoprazole 40 mg, daily  CBC w diff  CMP  Monitor bowel function  Serial abdom exam  I/Os  GI consult appreciated  Urology consult appreciated    Crohn's colitis, other complication [facility])  Assessment & Plan  PMH of Crohn's disease, complicated by fibrostenotic region, s/p R hemicolectomy w anastomosis 2012,   H/o recent admission for Crohn's flare,   - CT scan 11/22/2023: possible SBO, enteritis, colitis. - Colonoscopy 11/24/2023: ulcer in colon, regions of severe colitis. - Was discharged on prednisone taper, Rinvoq 45 mg daily, states he is compliant. GI is on board, input appreciated    Hold antibiotics for now. Continue n.p.o. and NG tube, monitor NG tube output per shift. Continue to maintain a large bore IV. Continue to trend blood work and V/S, monitor WBCs and any signs of infection. Continue with serial abdominal exams.    Continue to monitor stool output for any overt signs of GI bleeding. Continue IV Solu-Medrol 20 mg TID for Crohn's flareup, can convert to p.o. once NG tube is out/ tolerates p.o. Continue PPI  Continue PRN antiemetics  Continue supportive care. Essential (primary) hypertension  Assessment & Plan  BP today 122/73  As pt is currently NPO, hold PO meds, ctrl BP w labetalol IV PRN if BP >175    Right kidney mass  Assessment & Plan  CT abdomen and pelvis 2023: Heterogeneously enhancing mass within the right kidney most concerning for renal cell carcinoma. Urology consult appreciated  O/p f/u w urology in the office within 1-2 week(s) after d/c           VTE Pharmacologic Prophylaxis:   Pharmacologic: Enoxaparin (Lovenox)  Mechanical VTE Prophylaxis in Place: Yes      Discussions with Specialists or Other Care Team Provider: Yes    Education and Discussions with Family / Patient: Yes    Time Spent for Care: I spent 20 minutes for the care of the patient. Greater than 50% of the total time was spent on counseling and coordination of care as described above. Current Length of Stay: 2 day(s)    Current Patient Status: Inpatient   Certification Statement: The patient will continue to require additional inpatient hospital stay due to Small Bowel Obstruction, the need for ruling out, NPO/NG requirement. Discharge Plan: 1+ day    Code Status: Level 1 - Full Code      Subjective:   Patient reports some improvement regarding his abdominal pain, he is passing flatus, had 1 bowel movement with some relief. No blood in stool. Nausea is minimal.  No vomiting occurred within last 24 hours. Had Gastrograffin study/test today in the morning, results pending. Denies chest pain, shortness of breath, fever, chills, palpitations. No flank pain, no problems with urination.     Objective:     Vitals:   Temp (24hrs), Av.9 °F (36.6 °C), Min:97.3 °F (36.3 °C), Max:98.3 °F (36.8 °C)    Temp:  [97.3 °F (36.3 °C)-98.3 °F (36.8 °C)] 97.3 °F (36.3 °C)  HR: [58-89] 58  Resp:  [16-17] 17  BP: (107-131)/(69-86) 122/73  SpO2:  [92 %-94 %] 94 %  Body mass index is 25.36 kg/m². Input and Output Summary (last 24 hours): Intake/Output Summary (Last 24 hours) at 12/7/2023 1416  Last data filed at 12/7/2023 0401  Gross per 24 hour   Intake 0 ml   Output 1800 ml   Net -1800 ml       Physical Exam:     Physical Exam  Vitals and nursing note reviewed. Constitutional:       General: He is not in acute distress. Appearance: He is well-developed. He is not toxic-appearing. HENT:      Head: Normocephalic and atraumatic. Right Ear: External ear normal.      Left Ear: External ear normal.      Nose: Nose normal.      Mouth/Throat:      Mouth: Mucous membranes are moist.      Pharynx: Oropharynx is clear. Eyes:      Conjunctiva/sclera: Conjunctivae normal.   Cardiovascular:      Rate and Rhythm: Normal rate and regular rhythm. Pulses: Normal pulses. Heart sounds: Normal heart sounds. No murmur heard. Pulmonary:      Effort: Pulmonary effort is normal. No respiratory distress. Breath sounds: Normal breath sounds. No wheezing. Chest:      Chest wall: No tenderness. Abdominal:      General: Bowel sounds are normal. There is distension. Palpations: Abdomen is soft. Tenderness: There is abdominal tenderness in the right upper quadrant and right lower quadrant. Musculoskeletal:         General: No swelling. Normal range of motion. Cervical back: Normal range of motion and neck supple. No rigidity. Skin:     General: Skin is warm and dry. Capillary Refill: Capillary refill takes less than 2 seconds. Coloration: Skin is not jaundiced. Neurological:      Mental Status: He is alert and oriented to person, place, and time.    Psychiatric:         Mood and Affect: Mood normal.       Additional Data:     Labs:    Results from last 7 days   Lab Units 12/07/23  0407   WBC Thousand/uL 11.07*   HEMOGLOBIN g/dL 14.8   HEMATOCRIT % 45.1   PLATELETS Thousands/uL 347   NEUTROS PCT % 93*   LYMPHS PCT % 2*   MONOS PCT % 4   EOS PCT % 0     Results from last 7 days   Lab Units 12/07/23  0407   SODIUM mmol/L 134*   POTASSIUM mmol/L 4.6   CHLORIDE mmol/L 98   CO2 mmol/L 26   BUN mg/dL 13   CREATININE mg/dL 0.93   ANION GAP mmol/L 10   CALCIUM mg/dL 8.9   ALBUMIN g/dL 3.8   TOTAL BILIRUBIN mg/dL 1.19*   ALK PHOS U/L 67   ALT U/L 21   AST U/L 11*   GLUCOSE RANDOM mg/dL 125                 Results from last 7 days   Lab Units 12/06/23  0435 12/05/23  1317 12/05/23  0613   LACTIC ACID mmol/L  --  1.6 2.3*   PROCALCITONIN ng/ml <0.05  --   --            * I Have Reviewed All Lab Data Listed Above. * Additional Pertinent Lab Tests Reviewed: All Labs Within Last 24 Hours Reviewed    Imaging:    XR chest 1 view portable   Final Result      No acute cardiopulmonary disease. NG tube terminating in the stomach. Workstation performed: [id number]         CT abdomen pelvis with contrast   Final Result      Distended loops of small bowel compatible with small bowel obstruction with a transition point likely within the central abdomen just to the right of midline. Suspected prior partial colectomy. Small ascites in the abdomen and mesentery. Heterogeneously enhancing mass within the right kidney most concerning for renal cell carcinoma. The study was marked in [facility] for immediate notification. Workstation performed: [id number]         XR chest 1 view portable   Final Result      No acute cardiopulmonary disease. Workstation performed: [id number]         XR abdomen 1 view kub    (Results Pending)           Imaging Personally Reviewed by Myself Includes:  I have reviewed all imaging report during this hospitalization.      Recent Cultures (last 7 days):           Last 24 Hours Medication List:   Current Facility-Administered Medications   Medication Dose Route Frequency Provider Last Rate    enoxaparin  40 mg Subcutaneous Daily Neymar Glass MD      HYDROmorphone  0.5 mg Intravenous Q3H PRN Neymar Glass MD      iohexol  50 mL Oral Once in imaging Neymar Glass MD      labetalol  10 mg Intravenous Q4H PRN Neymar Glass MD      methylPREDNISolone sodium succinate  20 mg Intravenous Q8H [facility] HOME CELIA Ortiz      morphine injection  2 mg Intravenous Q4H PRN Galilea Pope MD      Or    morphine injection  4 mg Intravenous Q4H PRN Neymar Glass MD      multi-electrolyte  100 mL/hr Intravenous Continuous Galilea Pope  mL/hr (12/07/23 1110)    ondansetron  4 mg Intravenous Q6H PRN Neymar Glass MD      pantoprazole  40 mg Intravenous Q24H [facility] Mariaa Mcclellan PA-C      phenol  1 spray Mouth/Throat Q2H PRN Neymar Galss MD          Today, Patient Was Seen By: Ninette Lanes, MD    ** Please Note: Dictation voice to text software may have been used in the creation of this document.  **

## 2023-12-07 NOTE — ASSESSMENT & PLAN NOTE
Hx of SBO secondary to Crohn' disesase  PMH of Crohn's disease, complicated by fibrostenotic region, s/p R hemicolectomy w anastomosis 2012,     Presented to the ER for worsening abdominal pain, nausea, vomiting since last night after eating. ER course:  IVF, pain ctrl, NPO, NG tube    CT scan 11/05/2023: Distended loops of small bowel compatible with small bowel obstruction. Prior partial colectomy. Small ascites in the abdomen and mesentery.     Per surgery - "No acute surgical intervention warranted at this time"    Afebrile, V/S stable, /73  Potassium normalized, 4.6  Lactic acidosis resolved  Leukocytosis, WBC 11.07, trending down/improving from 12.66 yesterday     Plan:  Gastrografin challenge study   IVF  NG tube, suction  NPO  Pain ctrl  Antiemetics PRN  IV pantoprazole 40 mg, daily  CBC w diff  CMP  Monitor bowel function  Serial abdom exam  I/Os  GI consult appreciated  Urology consult appreciated

## 2023-12-07 NOTE — ASSESSMENT & PLAN NOTE
PMH of Crohn's disease, complicated by fibrostenotic region, s/p R hemicolectomy w anastomosis 2012,   H/o recent admission for Crohn's flare,   - CT scan 11/22/2023: possible SBO, enteritis, colitis. - Colonoscopy 11/24/2023: ulcer in colon, regions of severe colitis. - Was discharged on prednisone taper, Rinvoq 45 mg daily, states he is compliant. GI is on board, input appreciated    Hold antibiotics for now. Continue n.p.o. and NG tube, monitor NG tube output per shift. Continue to maintain a large bore IV. Continue to trend blood work and V/S, monitor WBCs and any signs of infection. Continue with serial abdominal exams. Continue to monitor stool output for any overt signs of GI bleeding. Continue IV Solu-Medrol 20 mg TID for Crohn's flareup, can convert to p.o. once NG tube is out/ tolerates p.o. Continue PPI  Continue PRN antiemetics  Continue supportive care.

## 2023-12-07 NOTE — PLAN OF CARE
Problem: PAIN - ADULT  Goal: Verbalizes/displays adequate comfort level or baseline comfort level  Description: Interventions:  - Encourage patient to monitor pain and request assistance  - Assess pain using appropriate pain scale  - Administer analgesics based on type and severity of pain and evaluate response  - Implement non-pharmacological measures as appropriate and evaluate response  - Consider cultural and social influences on pain and pain management  - Notify physician/advanced practitioner if interventions unsuccessful or patient reports new pain  Outcome: Progressing     Problem: INFECTION - ADULT  Goal: Absence or prevention of progression during hospitalization  Description: INTERVENTIONS:  - Assess and monitor for signs and symptoms of infection  - Monitor lab/diagnostic results  - Monitor all insertion sites, i.e. indwelling lines, tubes, and drains  - Monitor endotracheal if appropriate and nasal secretions for changes in amount and color  - [place] appropriate cooling/warming therapies per order  - Administer medications as ordered  - Instruct and encourage patient and family to use good hand hygiene technique  - Identify and instruct in appropriate isolation precautions for identified infection/condition  Outcome: Progressing     Problem: SAFETY ADULT  Goal: Patient will remain free of falls  Description: INTERVENTIONS:  - Educate patient/family on patient safety including physical limitations  - Instruct patient to call for assistance with activity   - Consult OT/PT to assist with strengthening/mobility   - Keep Call bell within reach  - Keep bed low and locked with side rails adjusted as appropriate  - Keep care items and personal belongings within reach  - Initiate and maintain comfort rounds  - Make Fall Risk Sign visible to staff  - Offer Toileting every 2 Hours, in advance of need  - Initiate/Maintain fall alarm  - Obtain necessary fall risk management equipment: non-skid footwear  - Apply yellow socks and bracelet for high fall risk patients  - Consider moving patient to room near nurses station  Outcome: Progressing  Goal: Maintain or return to baseline ADL function  Description: INTERVENTIONS:  -  Assess patient's ability to carry out ADLs; assess patient's baseline for ADL function and identify physical deficits which impact ability to perform ADLs (bathing, care of mouth/teeth, toileting, grooming, dressing, etc.)  - Assess/evaluate cause of self-care deficits   - Assess range of motion  - Assess patient's mobility; develop plan if impaired  - Assess patient's need for assistive devices and provide as appropriate  - Encourage maximum independence but intervene and supervise when necessary  - Involve family in performance of ADLs  - Assess for home care needs following discharge   - Consider OT consult to assist with ADL evaluation and planning for discharge  - Provide patient education as appropriate  Outcome: Progressing  Goal: Maintains/Returns to pre admission functional level  Description: INTERVENTIONS:  - Perform AM-PAC 6 Click Basic Mobility/ Daily Activity assessment daily.  - Set and communicate daily mobility goal to care team and patient/family/caregiver. - Collaborate with rehabilitation services on mobility goals if consulted  - Perform Range of Motion 3 times a day. - Reposition patient every 2 hours.   - Dangle patient 3 times a day  - Stand patient 3 times a day  - Ambulate patient 3 times a day  - Out of bed to chair 3 times a day   - Out of bed for meals 3 times a day  - Out of bed for toileting  - Record patient progress and toleration of activity level   Outcome: Progressing     Problem: DISCHARGE PLANNING  Goal: Discharge to home or other facility with appropriate resources  Description: INTERVENTIONS:  - Identify barriers to discharge w/patient and caregiver  - Arrange for needed discharge resources and transportation as appropriate  - Identify discharge learning needs (meds, wound care, etc.)  - Arrange for interpretive services to assist at discharge as needed  - Refer to Case Management Department for coordinating discharge planning if the patient needs post-hospital services based on physician/advanced practitioner order or complex needs related to functional status, cognitive ability, or social support system  Outcome: Progressing     Problem: Knowledge Deficit  Goal: Patient/family/caregiver demonstrates understanding of disease process, treatment plan, medications, and discharge instructions  Description: Complete learning assessment and assess knowledge base.   Interventions:  - Provide teaching at level of understanding  - Provide teaching via preferred learning methods  Outcome: Progressing     Problem: GASTROINTESTINAL - ADULT  Goal: Minimal or absence of nausea and/or vomiting  Description: INTERVENTIONS:  - Administer IV fluids if ordered to ensure adequate hydration  - Maintain NPO status until nausea and vomiting are resolved  - Nasogastric tube if ordered  - Administer ordered antiemetic medications as needed  - Provide nonpharmacologic comfort measures as appropriate  - Advance diet as tolerated, if ordered  - Consider nutrition services referral to assist patient with adequate nutrition and appropriate food choices  Outcome: Progressing  Goal: Maintains or returns to baseline bowel function  Description: INTERVENTIONS:  - Assess bowel function  - Encourage oral fluids to ensure adequate hydration  - Administer IV fluids if ordered to ensure adequate hydration  - Administer ordered medications as needed  - Encourage mobilization and activity  - Consider nutritional services referral to assist patient with adequate nutrition and appropriate food choices  Outcome: Progressing  Goal: Maintains adequate nutritional intake  Description: INTERVENTIONS:  - Monitor percentage of each meal consumed  - Identify factors contributing to decreased intake, treat as appropriate  - Assist with meals as needed  - Monitor I&O, weight, and lab values if indicated  - Obtain nutrition services referral as needed  Outcome: Progressing  Goal: Oral mucous membranes remain intact  Description: INTERVENTIONS  - Assess oral mucosa and hygiene practices  - Implement preventative oral hygiene regimen  - Implement oral medicated treatments as ordered  - Initiate Nutrition services referral as needed  Outcome: Progressing     Problem: GENITOURINARY - ADULT  Goal: Maintains or returns to baseline urinary function  Description: INTERVENTIONS:  - Assess urinary function  - Encourage oral fluids to ensure adequate hydration if ordered  - Administer IV fluids as ordered to ensure adequate hydration  - Administer ordered medications as needed  - Offer frequent toileting  - Follow urinary retention protocol if ordered  Outcome: Progressing  Goal: Absence of urinary retention  Description: INTERVENTIONS:  - Assess patient’s ability to void and empty bladder  - Monitor I/O  - Bladder scan as needed  - Discuss with physician/AP medications to alleviate retention as needed  - Discuss catheterization for long term situations as appropriate  Outcome: Progressing     Problem: METABOLIC, FLUID AND ELECTROLYTES - ADULT  Goal: Electrolytes maintained within normal limits  Description: INTERVENTIONS:  - Monitor labs and assess patient for signs and symptoms of electrolyte imbalances  - Administer electrolyte replacement as ordered  - Monitor response to electrolyte replacements, including repeat lab results as appropriate  - Instruct patient on fluid and nutrition as appropriate  Outcome: Progressing  Goal: Fluid balance maintained  Description: INTERVENTIONS:  - Monitor labs   - Monitor I/O and WT  - Instruct patient on fluid and nutrition as appropriate  - Assess for signs & symptoms of volume excess or deficit  Outcome: Progressing  Goal: Glucose maintained within target range  Description: INTERVENTIONS:  - Monitor Blood Glucose as ordered  - Assess for signs and symptoms of hyperglycemia and hypoglycemia  - Administer ordered medications to maintain glucose within target range  - Assess nutritional intake and initiate nutrition service referral as needed  Outcome: Progressing     Problem: HEMATOLOGIC - ADULT  Goal: Maintains hematologic stability  Description: INTERVENTIONS  - Assess for signs and symptoms of bleeding or hemorrhage  - Monitor labs  - Administer supportive blood products/factors as ordered and appropriate  Outcome: Progressing     Problem: MUSCULOSKELETAL - ADULT  Goal: Maintain or return mobility to safest level of function  Description: INTERVENTIONS:  - Assess patient's ability to carry out ADLs; assess patient's baseline for ADL function and identify physical deficits which impact ability to perform ADLs (bathing, care of mouth/teeth, toileting, grooming, dressing, etc.)  - Assess/evaluate cause of self-care deficits   - Assess range of motion  - Assess patient's mobility  - Assess patient's need for assistive devices and provide as appropriate  - Encourage maximum independence but intervene and supervise when necessary  - Involve family in performance of ADLs  - Assess for home care needs following discharge   - Consider OT consult to assist with ADL evaluation and planning for discharge  - Provide patient education as appropriate  Outcome: Progressing  Goal: Maintain proper alignment of affected body part  Description: INTERVENTIONS:  - Support, maintain and protect limb and body alignment  - Provide patient/ family with appropriate education  Outcome: Progressing

## 2023-12-08 ENCOUNTER — APPOINTMENT (INPATIENT)
Dept: RADIOLOGY | Facility: HOSPITAL | Age: 48
DRG: 245 | End: 2023-12-08
Payer: COMMERCIAL

## 2023-12-08 LAB
ALBUMIN SERPL BCP-MCNC: 3.6 G/DL (ref 3.5–5)
ALP SERPL-CCNC: 62 U/L (ref 34–104)
ALT SERPL W P-5'-P-CCNC: 19 U/L (ref 7–52)
ANION GAP SERPL CALCULATED.3IONS-SCNC: 6 MMOL/L
AST SERPL W P-5'-P-CCNC: 9 U/L (ref 13–39)
BASOPHILS # BLD MANUAL: 0 THOUSAND/UL (ref 0–0.1)
BASOPHILS NFR MAR MANUAL: 0 % (ref 0–1)
BILIRUB SERPL-MCNC: 0.58 MG/DL (ref 0.2–1)
BUN SERPL-MCNC: 17 MG/DL (ref 5–25)
CALCIUM SERPL-MCNC: 8.6 MG/DL (ref 8.4–10.2)
CHLORIDE SERPL-SCNC: 103 MMOL/L (ref 96–108)
CO2 SERPL-SCNC: 28 MMOL/L (ref 21–32)
CREAT SERPL-MCNC: 0.85 MG/DL (ref 0.6–1.3)
EOSINOPHIL # BLD MANUAL: 0 THOUSAND/UL (ref 0–0.4)
EOSINOPHIL NFR BLD MANUAL: 0 % (ref 0–6)
ERYTHROCYTE [DISTWIDTH] IN BLOOD BY AUTOMATED COUNT: 15.4 % (ref 11.6–15.1)
GFR SERPL CREATININE-BSD FRML MDRD: 103 ML/MIN/1.73SQ M
GLUCOSE SERPL-MCNC: 121 MG/DL (ref 65–140)
HCT VFR BLD AUTO: 39.9 % (ref 36.5–49.3)
HGB BLD-MCNC: 13 G/DL (ref 12–17)
LYMPHOCYTES # BLD AUTO: 1.04 THOUSAND/UL (ref 0.6–4.47)
LYMPHOCYTES # BLD AUTO: 8 % (ref 14–44)
MCH RBC QN AUTO: 27.3 PG (ref 26.8–34.3)
MCHC RBC AUTO-ENTMCNC: 32.6 G/DL (ref 31.4–37.4)
MCV RBC AUTO: 84 FL (ref 82–98)
MONOCYTES # BLD AUTO: 0.78 THOUSAND/UL (ref 0–1.22)
MONOCYTES NFR BLD: 6 % (ref 4–12)
NEUTROPHILS # BLD MANUAL: 11.19 THOUSAND/UL (ref 1.85–7.62)
NEUTS SEG NFR BLD AUTO: 86 % (ref 43–75)
PLATELET # BLD AUTO: 340 THOUSANDS/UL (ref 149–390)
PLATELET BLD QL SMEAR: ADEQUATE
PMV BLD AUTO: 9 FL (ref 8.9–12.7)
POTASSIUM SERPL-SCNC: 4.1 MMOL/L (ref 3.5–5.3)
PROT SERPL-MCNC: 5.6 G/DL (ref 6.4–8.4)
RBC # BLD AUTO: 4.77 MILLION/UL (ref 3.88–5.62)
RBC MORPH BLD: NORMAL
SODIUM SERPL-SCNC: 137 MMOL/L (ref 135–147)
WBC # BLD AUTO: 13.01 THOUSAND/UL (ref 4.31–10.16)

## 2023-12-08 PROCEDURE — 85007 BL SMEAR W/DIFF WBC COUNT: CPT

## 2023-12-08 PROCEDURE — 74022 RADEX COMPL AQT ABD SERIES: CPT

## 2023-12-08 PROCEDURE — 80053 COMPREHEN METABOLIC PANEL: CPT

## 2023-12-08 PROCEDURE — 99232 SBSQ HOSP IP/OBS MODERATE 35: CPT | Performed by: SURGERY

## 2023-12-08 PROCEDURE — 99231 SBSQ HOSP IP/OBS SF/LOW 25: CPT | Performed by: INTERNAL MEDICINE

## 2023-12-08 PROCEDURE — 85027 COMPLETE CBC AUTOMATED: CPT

## 2023-12-08 PROCEDURE — C9113 INJ PANTOPRAZOLE SODIUM, VIA: HCPCS | Performed by: PHYSICIAN ASSISTANT

## 2023-12-08 PROCEDURE — NC001 PR NO CHARGE

## 2023-12-08 PROCEDURE — 99233 SBSQ HOSP IP/OBS HIGH 50: CPT | Performed by: FAMILY MEDICINE

## 2023-12-08 RX ORDER — AMLODIPINE BESYLATE 2.5 MG/1
2.5 TABLET ORAL DAILY
Status: DISCONTINUED | OUTPATIENT
Start: 2023-12-08 | End: 2023-12-10 | Stop reason: HOSPADM

## 2023-12-08 RX ADMIN — METHYLPREDNISOLONE SODIUM SUCCINATE 20 MG: 40 INJECTION, POWDER, FOR SOLUTION INTRAMUSCULAR; INTRAVENOUS at 05:44

## 2023-12-08 RX ADMIN — METHYLPREDNISOLONE SODIUM SUCCINATE 20 MG: 40 INJECTION, POWDER, FOR SOLUTION INTRAMUSCULAR; INTRAVENOUS at 21:02

## 2023-12-08 RX ADMIN — AMLODIPINE BESYLATE 2.5 MG: 2.5 TABLET ORAL at 12:41

## 2023-12-08 RX ADMIN — MORPHINE SULFATE 2 MG: 2 INJECTION, SOLUTION INTRAMUSCULAR; INTRAVENOUS at 21:02

## 2023-12-08 RX ADMIN — SODIUM CHLORIDE, SODIUM GLUCONATE, SODIUM ACETATE, POTASSIUM CHLORIDE, MAGNESIUM CHLORIDE, SODIUM PHOSPHATE, DIBASIC, AND POTASSIUM PHOSPHATE 100 ML/HR: .53; .5; .37; .037; .03; .012; .00082 INJECTION, SOLUTION INTRAVENOUS at 08:19

## 2023-12-08 RX ADMIN — HYDROMORPHONE HYDROCHLORIDE 0.5 MG: 1 INJECTION, SOLUTION INTRAMUSCULAR; INTRAVENOUS; SUBCUTANEOUS at 21:58

## 2023-12-08 RX ADMIN — ENOXAPARIN SODIUM 40 MG: 40 INJECTION SUBCUTANEOUS at 08:12

## 2023-12-08 RX ADMIN — METHYLPREDNISOLONE SODIUM SUCCINATE 20 MG: 40 INJECTION, POWDER, FOR SOLUTION INTRAMUSCULAR; INTRAVENOUS at 13:30

## 2023-12-08 RX ADMIN — PANTOPRAZOLE SODIUM 40 MG: 40 INJECTION, POWDER, LYOPHILIZED, FOR SOLUTION INTRAVENOUS at 08:12

## 2023-12-08 RX ADMIN — MORPHINE SULFATE 2 MG: 2 INJECTION, SOLUTION INTRAMUSCULAR; INTRAVENOUS at 04:14

## 2023-12-08 NOTE — PROGRESS NOTES
Progress Note- [facility] 50 y.o. male MRN: [id number]    Unit/Bed#: 413-01 Encounter: [id number]      Assessment and Plan:    Serology: AST 9, total protein 5.6, WBCs 13.01, RDW 15.4. Exam: Pt was laying in bed comfortably during exam today, A&O x 3, pleasant and cooperative. Abdomen is soft, flat, with very mild tenderness noted upon exam in the upper quadrants, without any guarding or rebound tenderness noted, with hypoactive bowel sounds x 4. Skin is non-icteric. No edema noted of the b/l lower extremities upon exam today. Crohn's colitis  Small bowel obstruction  Abdominal pain  Nausea and vomiting  Leukocytosis  Ascites  Right kidney mass    Continue n.p.o. and NG tube as ordered for now, consider discontinuing NG tube if continues with low out put or able to tolerate clamping without N/V. Continue to maintain a large bore IV. Continue to trend blood work and vital signs, specifically monitoring WBCs and any signs of infection. Continue with serial abdominal exams. Continue to monitor NG tube output per shift. Continue to monitor stool output for any overt signs of GI bleeding. Continue IV Solu-Medrol 20 mg 3 times daily and can convert to p.o.once patient's NG tube is out and is tolerating p.o. Continue PPI as ordered. Continue PRN antiemetics as ordered. Continue rest of medications as per primary team.   Continue supportive care. Encouraged patient to follow up with his GI team outpatient ASAP upon eventual discharge and that he should re-start his Rinvoq upon discharge until he is seen by his GI team outpatient to discuss his medication options. The patient was agreeable and verbalized an understanding. GI will continue to follow.    __________________________________________________________________    Subjective:     Patient is a 50 y.o. male  with a past medical history of Crohn's disease status post right hemicolectomy with anastomosis in 2012 who presented to the ED on December 5, 2023 for worsening nausea, vomiting (non-bloody), and lower abdominal pain. He did proceed with a gastrografin study yesterday. The patient has had 2 larger bowel movements today since the contrast without any blood or melena noted and is also passing flatus. The patient reports that the nausea has pretty much subsided and that the abdominal pain has significantly improved overall. He does continue with the NG tube and it does appear that the output is starting to slow down and in general he is feeling better. The patient reports he is hungry but is still n.p.o. with sips with meds. The patient is hopeful that sometime today the NG tube can be taken out and he can start on a clear liquid diet and slowly advance over time.     Medication Administration - last 24 hours from 12/07/2023 0943 to 12/08/2023 0943         Date/Time Order Dose Route Action Action by     12/08/2023 0819 EST multi-electrolyte (PLASMALYTE-A/ISOLYTE-S PH 7.4) IV solution 100 mL/hr Intravenous [address] Luis Tang RN     12/07/2023 2307 EST multi-electrolyte (PLASMALYTE-A/ISOLYTE-S PH 7.4) IV solution 100 mL/hr Intravenous New Bag Ita Roach RN     12/07/2023 1110 EST multi-electrolyte (PLASMALYTE-A/ISOLYTE-S PH 7.4) IV solution 100 mL/hr Intravenous New Bag Joce Spence     12/07/2023 1110 EST ondansetron (ZOFRAN) injection 4 mg 4 mg Intravenous Given [facility]     12/08/2023 4194 EST enoxaparin (LOVENOX) subcutaneous injection 40 mg 40 mg Subcutaneous Given Luis Tang RN     12/08/2023 0414 EST morphine injection 2 mg 2 mg Intravenous Given Liliana Hanson RN     12/07/2023 2305 EST morphine injection 2 mg -- Intravenous See Alternative Ita Roach RN     12/07/2023 1325 EST morphine injection 2 mg -- Intravenous See Alternative Insondra Cage     12/08/2023 0414 EST morphine injection 4 mg -- Intravenous See Alternative Liliana Hanson RN     12/07/2023 2305 EST morphine injection 4 mg 4 mg Intravenous Given Ashlee David RN     12/07/2023 1325 EST morphine injection 4 mg 4 mg Intravenous Given Ross Ascencio     12/07/2023 2348 EST HYDROmorphone (DILAUDID) injection 0.5 mg 0.5 mg Intravenous Given Ashlee David RN     12/07/2023 1937 EST HYDROmorphone (DILAUDID) injection 0.5 mg 0.5 mg Intravenous Given Ashlee David RN     12/08/2023 3686 EST pantoprazole (PROTONIX) injection 40 mg 40 mg Intravenous Given Duke Julien, KAYLIN     12/08/2023 0544 EST methylPREDNISolone sodium succinate (Solu-MEDROL) injection 20 mg 20 mg Intravenous Given Lupita Hamman, KAYLIN     12/07/2023 2101 EST methylPREDNISolone sodium succinate (Solu-MEDROL) injection 20 mg 20 mg Intravenous Given Ashlee David RN     12/07/2023 1325 EST methylPREDNISolone sodium succinate (Solu-MEDROL) injection 20 mg 20 mg Intravenous Given Ross Ascencio     12/07/2023 1326 EST phenol (CHLORASEPTIC) 1.4 % mucosal liquid 1 spray 1 spray Mouth/Throat Given Joce Spence            Objective:     Vitals: Blood pressure 138/90, pulse 58, temperature 97.7 °F (36.5 °C), resp. rate 19, height 6' 1" (1.854 m), weight 87.2 kg (192 lb 3.2 oz), SpO2 94 %. ,Body mass index is 25.36 kg/m².       Intake/Output Summary (Last 24 hours) at 12/8/2023 0943  Last data filed at 12/8/2023 0819  Gross per 24 hour   Intake 4393.33 ml   Output 1475 ml   Net 2918.33 ml       Physical Exam:   General Appearance: Awake and alert, in no acute distress  Abdomen: Soft, non-tender, non-distended; bowel sounds normal; no masses or no organomegaly    Invasive Devices       Peripheral Intravenous Line  Duration             Peripheral IV 12/05/23 Right Antecubital 3 days              Drain  Duration             NG/OG/Enteral Tube Nasogastric 14 Fr Right nare 2 days                    Lab Results:  Admission on 12/05/2023   Component Date Value    WBC 12/05/2023 15.34 (H)     RBC 12/05/2023 5.82 (H)     Hemoglobin 12/05/2023 15.6     Hematocrit 12/05/2023 47.9 MCV 12/05/2023 82     MCH 12/05/2023 26.8     MCHC 12/05/2023 32.6     RDW 12/05/2023 15.9 (H)     MPV 12/05/2023 8.9     Platelets 85/65/1526 363     nRBC 12/05/2023 0     Neutrophils Relative 12/05/2023 83 (H)     Immat GRANS % 12/05/2023 2     Lymphocytes Relative 12/05/2023 8 (L)     Monocytes Relative 12/05/2023 6     Eosinophils Relative 12/05/2023 0     Basophils Relative 12/05/2023 1     Neutrophils Absolute 12/05/2023 12.72 (H)     Immature Grans Absolute 12/05/2023 0.37 (H)     Lymphocytes Absolute 12/05/2023 1.29     Monocytes Absolute 12/05/2023 0.84     Eosinophils Absolute 12/05/2023 0.03     Basophils Absolute 12/05/2023 0.09     Sodium 12/05/2023 138     Potassium 12/05/2023 3.4 (L)     Chloride 12/05/2023 99     CO2 12/05/2023 28     ANION GAP 12/05/2023 11     BUN 12/05/2023 19     Creatinine 12/05/2023 1.01     Glucose 12/05/2023 102     Calcium 12/05/2023 9.8     AST 12/05/2023 12 (L)     ALT 12/05/2023 28     Alkaline Phosphatase 12/05/2023 74     Total Protein 12/05/2023 6.4     Albumin 12/05/2023 4.5     Total Bilirubin 12/05/2023 0.82     eGFR 12/05/2023 87     Lipase 12/05/2023 13     Sed Rate 12/05/2023 <1     CRP 12/05/2023 2.4     Color, UA 12/06/2023 Yellow     Clarity, UA 12/06/2023 Clear     Specific Gravity, UA 12/06/2023 1.020     pH, UA 12/06/2023 7.0     Leukocytes, UA 12/06/2023 Negative     Nitrite, UA 12/06/2023 Negative     Protein, UA 12/06/2023 Negative     Glucose, UA 12/06/2023 Negative     Ketones, UA 12/06/2023 Negative     Urobilinogen, UA 12/06/2023 1.0     Bilirubin, UA 12/06/2023 Negative     Occult Blood, UA 12/06/2023 Negative     LACTIC ACID 12/05/2023 2.3 (HH)     LACTIC ACID 12/05/2023 1.6     Sodium 12/06/2023 135     Potassium 12/06/2023 4.0     Chloride 12/06/2023 99     CO2 12/06/2023 28     ANION GAP 12/06/2023 8     BUN 12/06/2023 15     Creatinine 12/06/2023 0.90     Glucose 12/06/2023 101     Calcium 12/06/2023 8.6     AST 12/06/2023 10 (L)     ALT 12/06/2023 20     Alkaline Phosphatase 12/06/2023 73     Total Protein 12/06/2023 5.2 (L)     Albumin 12/06/2023 3.6     Total Bilirubin 12/06/2023 0.95     eGFR 12/06/2023 100     Magnesium 12/06/2023 2.5     WBC 12/06/2023 12.66 (H)     RBC 12/06/2023 5.37     Hemoglobin 12/06/2023 14.4     Hematocrit 12/06/2023 44.5     MCV 12/06/2023 83     MCH 12/06/2023 26.8     MCHC 12/06/2023 32.4     RDW 12/06/2023 15.9 (H)     MPV 12/06/2023 9.3     Platelets 70/11/2747 286     nRBC 12/06/2023 0     Neutrophils Relative 12/06/2023 85 (H)     Immat GRANS % 12/06/2023 1     Lymphocytes Relative 12/06/2023 6 (L)     Monocytes Relative 12/06/2023 6     Eosinophils Relative 12/06/2023 1     Basophils Relative 12/06/2023 1     Neutrophils Absolute 12/06/2023 10.85 (H)     Immature Grans Absolute 12/06/2023 0.16     Lymphocytes Absolute 12/06/2023 0.81     Monocytes Absolute 12/06/2023 0.70     Eosinophils Absolute 12/06/2023 0.08     Basophils Absolute 12/06/2023 0.06     Procalcitonin 12/06/2023 <0.05     WBC 12/07/2023 11.07 (H)     RBC 12/07/2023 5.40     Hemoglobin 12/07/2023 14.8     Hematocrit 12/07/2023 45.1     MCV 12/07/2023 84     MCH 12/07/2023 27.4     MCHC 12/07/2023 32.8     RDW 12/07/2023 15.5 (H)     MPV 12/07/2023 9.0     Platelets 65/32/5289 347     nRBC 12/07/2023 0     Neutrophils Relative 12/07/2023 93 (H)     Immat GRANS % 12/07/2023 1     Lymphocytes Relative 12/07/2023 2 (L)     Monocytes Relative 12/07/2023 4     Eosinophils Relative 12/07/2023 0     Basophils Relative 12/07/2023 0     Neutrophils Absolute 12/07/2023 10.25 (H)     Immature Grans Absolute 12/07/2023 0.14     Lymphocytes Absolute 12/07/2023 0.26 (L)     Monocytes Absolute 12/07/2023 0.39     Eosinophils Absolute 12/07/2023 0.00     Basophils Absolute 12/07/2023 0.03     Sodium 12/07/2023 134 (L)     Potassium 12/07/2023 4.6     Chloride 12/07/2023 98     CO2 12/07/2023 26     ANION GAP 12/07/2023 10     BUN 12/07/2023 13     Creatinine 12/07/2023 0.93     Glucose 12/07/2023 125     Calcium 12/07/2023 8.9     AST 12/07/2023 11 (L)     ALT 12/07/2023 21     Alkaline Phosphatase 12/07/2023 67     Total Protein 12/07/2023 6.0 (L)     Albumin 12/07/2023 3.8     Total Bilirubin 12/07/2023 1.19 (H)     eGFR 12/07/2023 96     WBC 12/08/2023 13.01 (H)     RBC 12/08/2023 4.77     Hemoglobin 12/08/2023 13.0     Hematocrit 12/08/2023 39.9     MCV 12/08/2023 84     MCH 12/08/2023 27.3     MCHC 12/08/2023 32.6     RDW 12/08/2023 15.4 (H)     MPV 12/08/2023 9.0     Platelets 53/97/1030 340     Sodium 12/08/2023 137     Potassium 12/08/2023 4.1     Chloride 12/08/2023 103     CO2 12/08/2023 28     ANION GAP 12/08/2023 6     BUN 12/08/2023 17     Creatinine 12/08/2023 0.85     Glucose 12/08/2023 121     Calcium 12/08/2023 8.6     AST 12/08/2023 9 (L)     ALT 12/08/2023 19     Alkaline Phosphatase 12/08/2023 62     Total Protein 12/08/2023 5.6 (L)     Albumin 12/08/2023 3.6     Total Bilirubin 12/08/2023 0.58     eGFR 12/08/2023 103     Segmented % 12/08/2023 86 (H)     Lymphocytes % 12/08/2023 8 (L)     Monocytes % 12/08/2023 6     Eosinophils, % 12/08/2023 0     Basophils % 12/08/2023 0     Absolute Neutrophils 12/08/2023 11.19 (H)     Lymphocytes Absolute 12/08/2023 1.04     Monocytes Absolute 12/08/2023 0.78     Eosinophils Absolute 12/08/2023 0.00     Basophils Absolute 12/08/2023 0.00     RBC Morphology 12/08/2023 Normal     Platelet Estimate 31/11/6325 Adequate        Imaging Studies: I have personally reviewed pertinent imaging studies.

## 2023-12-08 NOTE — PROGRESS NOTES
Progress Note - General Surgery   [facility] [place] 50 y.o. male MRN: [id number]  Unit/Bed#: 397-23 Encounter: [id number]    Assessment:     78-year-old male with known Crohn's disease admitted 3 days ago with worsening abdominal pain, nausea and vomiting, CT scan showed evidence of small bowel obstruction. Potential acute exacerbation and small bowel obstruction or possibly intra-abdominal adhesions from prior surgery. Previous right hemicolectomy 2012 for Crohn's disease. NG tube clamped overnight, reattached to wall suction this morning, minimal drainage. Gastrografin challenge study performed yesterday shows contrast in the colon and rectum, significant improvement of distended small bowel. Incidental finding on CT scan abdomen pelvis with IV contrast 12/5/2023 showed 2.8 cm right medial kidney mass suspicious for RCC. Urology consultation recommended outpatient follow-up after discharge. Abdominal exam shows nondistended abdomen, positive bowel sounds are heard in 4 quadrants. Midline surgical scar with a small incisional hernia noted. Minimal tenderness. No guarding or rebound. No masses are felt. CBC today showed a white count 13.01 (11.07 and 12.66)  Hemoglobin 13.0  CMP shows normal electrolytes and LFTs. Total protein 5.6, albumin 3.6. Creatinine normal 0.85. NG tube output yesterday 400 mL dark green bilious, this morning 100 mL recorded but when reattached to wall suction now just scant drainage noted. .  NG tube had been clamped overnight which he is tolerating. Vital signs reviewed, afebrile. Plan:  -Overall the patient is improved. Passing some flatus. Liquid bowel movement yesterday.  -Discussed via telephone with Dr. Omar Orona  -Abdominal x-ray this morning showed significant improvement with small bowel distention and also presence of contrast in colon and rectum.  -Will discontinue NG tube.   -Continue IV protonix  -IV steroids and management of Crohn's flareup by gastroenterology  -Advance clear liquids as tolerated, no carbonation. Patient was explained to drink in moderation.  -Out of bed to chair, ambulate in the hallway.  -He may shower, nursing can discontinue his IV fluids and cover site.  -Continue IV fluids for hydration until p.o. intake tolerated  -Analgesics and antiemetics as ordered as needed  -CBC, BMP in am ordered. Noted slight increase in leukocytosis today, trend white count. This may be because of his IV steroids. -Medical management per the attending primary service, updated Dr. Audie French  -Outpatient follow-up after hospital discharge with urologyHe will need with regards to incidental finding of right kidney mass, 2.8 cm, seen on CT scan which is suspicious for renal cell carcinoma. Urology consultation was performed during this inpatient hospital admission, attestation with Dr. Liu Brooks recommended no urgent intervention or transfer.  -Dr. Johnny White is the on-call general surgery starting tonight through the weekend. Subjective/Objective     Chief Complaint: Overall patient feeling better. Passing flatus. Had a liquid bowel movement with some clumps of formed soft stool yesterday around 5:30 in the evening. Denies nausea. Subjective: Patient denies any complaints this morning. He underwent Gastrografin study yesterday, repeat a.m. abdominal x-ray this morning was performed. He denies any nausea. Abdominal pain is significantly improved, feels less bloated. He denies any fever or chills. Nursing reports that the NG tube had been clamped overnight, apparently tube became dislodged when he turned his head and pulled the tube attached with a safety pin to his gown.   Nursing had to readvance the NG tube last evening around 7:30 PM.    Scheduled Meds:  Current Facility-Administered Medications   Medication Dose Route Frequency Provider Last Rate    enoxaparin  40 mg Subcutaneous Daily Juliano Saleh MD      HYDROmorphone 0.5 mg Intravenous Q3H PRN Virgilio Garcia MD      iohexol  50 mL Oral Once in imaging Virgilio Garcia MD      labetalol  10 mg Intravenous Q4H PRN Virgilio Garcia MD      methylPREDNISolone sodium succinate  20 mg Intravenous Q8H [facility] CELIA Crespo      morphine injection  2 mg Intravenous Q4H PRN Galilea Pope MD      Or    morphine injection  4 mg Intravenous Q4H PRN Virgilio Garcia MD      multi-electrolyte  100 mL/hr Intravenous Continuous Galilea Pope  mL/hr (12/08/23 0819)    ondansetron  4 mg Intravenous Q6H PRN Virgilio Garcia MD      pantoprazole  40 mg Intravenous Q24H [facility] Mariaa Mcclellan PA-C      phenol  1 spray Mouth/Throat Q2H PRN Galilea Pope MD       Continuous Infusions:multi-electrolyte, 100 mL/hr, Last Rate: 100 mL/hr (12/08/23 0819)      PRN Meds:. HYDROmorphone    iohexol    labetalol    morphine injection **OR** morphine injection    ondansetron    phenol      Objective:     Blood pressure 138/90, pulse 58, temperature 97.7 °F (36.5 °C), resp. rate 19, height 6' 1" (1.854 m), weight 87.2 kg (192 lb 3.2 oz), SpO2 94 %. ,Body mass index is 25.36 kg/m². I/O         12/06 0701 12/07 0700 12/07 0701 12/08 0700 12/08 0701 12/09 0700    P. O. 0      I.V. (mL/kg) 1000 (11.5)  4393.3 (50.4)    NG/GT       Total Intake(mL/kg) 1000 (11.5)  4393.3 (50.4)    Urine (mL/kg/hr) 1600 (0.8) 975 (0.5)     Emesis/NG output 350 400 100    Total Output 1950 1375 100    Net -950 -1375 +4293.3                 Invasive Devices       Peripheral Intravenous Line  Duration             Peripheral IV 12/05/23 Right Antecubital 3 days              Drain  Duration             NG/OG/Enteral Tube Nasogastric 14 Fr Right nare 2 days                    Physical Exam:     Awake, no apparent distress. ENT clear. Oral mucosa pink and moist.  NG tube reattached to suction this morning, scant bilious drainage in tubing. Neck trachea midline, supple.   Chest good inspiratory effort. Heart RRR. No murmur or gallop heard. Lungs clear to auscultation. No rales or rhonchi. Back no flank tenderness to percussion. Abdomen flat in appearance. Nondistended. Positive bowel sounds are heard throughout. Midline surgical scar with small incision hernia at the top pole which is nontender. Patient with minimal abdominal tenderness more on the right lower abdomen, no peritoneal signs. Extremities moves all 4 extremities well. Ambulation not witnessed. No calf tenderness or peripheral edema. Both lower extremities are warm to touch and appear well-perfused in the feet. Neurological exam no focal motor or sensory neurologic weakness. Fully oriented. Speech clear. Mental status normal.    Lab, Imaging and other studies:I have personally reviewed pertinent lab results.   , CBC:   Lab Results   Component Value Date    WBC 13.01 (H) 12/08/2023    HGB 13.0 12/08/2023    HCT 39.9 12/08/2023    MCV 84 12/08/2023     12/08/2023    RBC 4.77 12/08/2023    MCH 27.3 12/08/2023    MCHC 32.6 12/08/2023    RDW 15.4 (H) 12/08/2023    MPV 9.0 12/08/2023   , CMP:   Lab Results   Component Value Date    SODIUM 137 12/08/2023    K 4.1 12/08/2023     12/08/2023    CO2 28 12/08/2023    BUN 17 12/08/2023    CREATININE 0.85 12/08/2023    CALCIUM 8.6 12/08/2023    AST 9 (L) 12/08/2023    ALT 19 12/08/2023    ALKPHOS 62 12/08/2023    EGFR 103 12/08/2023     VTE Pharmacologic Prophylaxis: Enoxaparin (Lovenox)  VTE Mechanical Prophylaxis: sequential compression device    Rosaura Coto PA-C

## 2023-12-08 NOTE — ASSESSMENT & PLAN NOTE
Hx of SBO secondary to Crohn' disesase  PMH of Crohn's disease, complicated by fibrostenotic region, s/p R hemicolectomy w anastomosis 2012,     Presented to the ER for worsening abdominal pain, nausea, vomiting since last night after eating. ER course:  IVF, pain ctrl, NPO, NG tube    CT scan 11/05/2023: Distended loops of small bowel compatible with small bowel obstruction. Prior partial colectomy. Small ascites in the abdomen and mesentery. Per surgery - "No acute surgical intervention warranted at this time"    XR abdomen obstruction series [[id number]]    12/08/23 0749  Significantly improved small bowel distention. condition improved. , passing flatus, liquid BM yesterday.   Afebrile, V/S stable, /90  Potassium normalized, 4.1  Leukocytosis, WBC 13.01 today, probably because of IV steroids    Plan per surgery

## 2023-12-08 NOTE — ASSESSMENT & PLAN NOTE
PMH of Crohn's disease, complicated by fibrostenotic region, s/p R hemicolectomy w anastomosis 2012,   H/o recent admission for Crohn's flare,   - CT scan 11/22/2023: possible SBO, enteritis, colitis. - Colonoscopy 11/24/2023: ulcer in colon, regions of severe colitis. - Was discharged on prednisone taper, Rinvoq 45 mg daily, states he is compliant.     Management of Chron's flare by GI

## 2023-12-08 NOTE — TELEPHONE ENCOUNTER
Patient remains admitted. Continue to monitor for discharge, then will contact patient to assist with scheduling robotic MD visit.

## 2023-12-08 NOTE — ASSESSMENT & PLAN NOTE
BP today 138/90  Restart amlodipine 2.5 mg if tolerates PO, if not then ctrl BP w labetalol IV PRN if BP >175

## 2023-12-08 NOTE — PROGRESS NOTES
[address]  Progress Note  Name: Christopher Abad  MRN: [id number]  Unit/Bed#: 988-60 I Date of Admission: 12/5/2023   Date of Service: 12/8/2023 I Hospital Day: 3    Assessment/Plan   * SBO (small bowel obstruction) ([address] )  Assessment & Plan  Hx of SBO secondary to Crohn' disesase  PMH of Crohn's disease, complicated by fibrostenotic region, s/p R hemicolectomy w anastomosis 2012,     Presented to the ER for worsening abdominal pain, nausea, vomiting since last night after eating. ER course:  IVF, pain ctrl, NPO, NG tube    CT scan 11/05/2023: Distended loops of small bowel compatible with small bowel obstruction. Prior partial colectomy. Small ascites in the abdomen and mesentery. Per surgery - "No acute surgical intervention warranted at this time"    XR abdomen obstruction series [[id number]]    12/08/23 0749  Significantly improved small bowel distention. condition improved. , passing flatus, liquid BM yesterday. Afebrile, V/S stable, /90  Potassium normalized, 4.1  Leukocytosis, WBC 13.01 today, probably because of IV steroids    Plan per surgery    Crohn's colitis, other complication [facility])  Assessment & Plan  PMH of Crohn's disease, complicated by fibrostenotic region, s/p R hemicolectomy w anastomosis 2012,   H/o recent admission for Crohn's flare,   - CT scan 11/22/2023: possible SBO, enteritis, colitis. - Colonoscopy 11/24/2023: ulcer in colon, regions of severe colitis. - Was discharged on prednisone taper, Rinvoq 45 mg daily, states he is compliant. Management of Chron's flare by GI      Essential (primary) hypertension  Assessment & Plan  BP today 138/90  Restart amlodipine 2.5 mg if tolerates PO, if not then ctrl BP w labetalol IV PRN if BP >175    Right kidney mass  Assessment & Plan  CT abdomen and pelvis 12/05/2023: Heterogeneously enhancing mass within the right kidney most concerning for renal cell carcinoma.    Urology consult appreciated  O/p f/u w urology in the office within 1-2 week(s) after d/c           VTE Pharmacologic Prophylaxis:   Pharmacologic: Enoxaparin (Lovenox)  Mechanical VTE Prophylaxis in Place: Yes      Discussions with Specialists or Other Care Team Provider: Yes    Education and Discussions with Family / Patient: Yes    Time Spent for Care: I spent 20 minutes for the care of the patient. Greater than 50% of the total time was spent on counseling and coordination of care as described above. Current Length of Stay: 3 day(s)    Current Patient Status: Inpatient   Certification Statement: The patient will continue to require additional inpatient hospital stay due to continue management of SBO until resolution. Discharge Plan: 1+ day    Code Status: Level 1 - Full Code      Subjective:   Patient condition improved. , he is passing flatus, had liquid bowel movement yesterday, no blood in stool. Does not have acute abdominal pain, tenderness to touch decreased decreased and is minimal today. Denies nausea or vomiting, chest pain or shortness of breath, fevers or chills palpitations. Objective:     Vitals:   Temp (24hrs), Av.7 °F (36.5 °C), Min:97.7 °F (36.5 °C), Max:97.7 °F (36.5 °C)    Temp:  [97.7 °F (36.5 °C)] 97.7 °F (36.5 °C)  HR:  [58-77] 58  Resp:  [18-19] 19  BP: (119-138)/(81-90) 138/90  SpO2:  [93 %-94 %] 94 %  Body mass index is 25.36 kg/m². Input and Output Summary (last 24 hours): Intake/Output Summary (Last 24 hours) at 2023 1052  Last data filed at 2023 0819  Gross per 24 hour   Intake 4393.33 ml   Output 1475 ml   Net 2918.33 ml       Physical Exam:     Physical Exam  Vitals and nursing note reviewed. Constitutional:       General: He is not in acute distress. Appearance: He is well-developed. HENT:      Head: Normocephalic and atraumatic. Nose: Nose normal.      Mouth/Throat:      Mouth: Mucous membranes are moist.      Pharynx: Oropharynx is clear.    Eyes:      Conjunctiva/sclera: Conjunctivae normal.   Cardiovascular:      Rate and Rhythm: Normal rate and regular rhythm. Pulses: Normal pulses. Heart sounds: Normal heart sounds. No murmur heard. Pulmonary:      Effort: Pulmonary effort is normal. No respiratory distress. Breath sounds: Normal breath sounds. Abdominal:      General: Bowel sounds are normal.      Palpations: Abdomen is soft. Tenderness: There is abdominal tenderness (mild). There is no rebound. Musculoskeletal:         General: No swelling. Cervical back: Neck supple. Skin:     General: Skin is warm and dry. Capillary Refill: Capillary refill takes less than 2 seconds. Neurological:      Mental Status: He is alert and oriented to person, place, and time. Psychiatric:         Mood and Affect: Mood normal.       Additional Data:     Labs:    Results from last 7 days   Lab Units 12/08/23  0620 12/07/23  0407   WBC Thousand/uL 13.01* 11.07*   HEMOGLOBIN g/dL 13.0 14.8   HEMATOCRIT % 39.9 45.1   PLATELETS Thousands/uL 340 347   NEUTROS PCT %  --  93*   LYMPHS PCT %  --  2*   LYMPHO PCT % 8*  --    MONOS PCT %  --  4   MONO PCT % 6  --    EOS PCT % 0 0     Results from last 7 days   Lab Units 12/08/23  0620   SODIUM mmol/L 137   POTASSIUM mmol/L 4.1   CHLORIDE mmol/L 103   CO2 mmol/L 28   BUN mg/dL 17   CREATININE mg/dL 0.85   ANION GAP mmol/L 6   CALCIUM mg/dL 8.6   ALBUMIN g/dL 3.6   TOTAL BILIRUBIN mg/dL 0.58   ALK PHOS U/L 62   ALT U/L 19   AST U/L 9*   GLUCOSE RANDOM mg/dL 121                 Results from last 7 days   Lab Units 12/06/23  0435 12/05/23  1317 12/05/23  0613   LACTIC ACID mmol/L  --  1.6 2.3*   PROCALCITONIN ng/ml <0.05  --   --            * I Have Reviewed All Lab Data Listed Above. * Additional Pertinent Lab Tests Reviewed: All Labs Within Last 24 Hours Reviewed    Imaging:    XR abdomen obstruction series   Final Result      Significantly improved small bowel distention. NG tube tip is at GE junction.  Consider advancement. Workstation performed: [id number]         XR abdomen 1 view kub   Final Result      Gastrografin challenge performed. After 8 hours of contrast administration, contrast has reached the colon and rectum. Persistent small bowel distention. Findings compatible with partial mechanical small bowel obstruction. Workstation performed: [id number]         XR chest 1 view portable   Final Result      No acute cardiopulmonary disease. NG tube terminating in the stomach. Workstation performed: [id number]         CT abdomen pelvis with contrast   Final Result      Distended loops of small bowel compatible with small bowel obstruction with a transition point likely within the central abdomen just to the right of midline. Suspected prior partial colectomy. Small ascites in the abdomen and mesentery. Heterogeneously enhancing mass within the right kidney most concerning for renal cell carcinoma. The study was marked in [facility] for immediate notification. Workstation performed: [id number]         XR chest 1 view portable   Final Result      No acute cardiopulmonary disease. Workstation performed: [id number]           Imaging Personally Reviewed by Myself Includes:  I have reviewed all imaging report during this hospitalization.      Recent Cultures (last 7 days):         Last 24 Hours Medication List:   Current Facility-Administered Medications   Medication Dose Route Frequency Provider Last Rate    amLODIPine  2.5 mg Oral Daily Luann Mcknight MD      enoxaparin  40 mg Subcutaneous Daily Shavon Mcknight MD      HYDROmorphone  0.5 mg Intravenous Q3H PRN Shavon Mcknight MD      iohexol  50 mL Oral Once in imaging Shavon Mcknight MD      labetalol  10 mg Intravenous Q4H PRN Shavon Mcknight MD      methylPREDNISolone sodium succinate  20 mg Intravenous Q8H [address] CELIA Herman      morphine injection  2 mg Intravenous Q4H PRN Galilea Pope MD      Or    morphine injection  4 mg Intravenous Q4H PRN Galilea Pope MD      multi-electrolyte  100 mL/hr Intravenous Continuous Galilea Pope  mL/hr (12/08/23 0819)    ondansetron  4 mg Intravenous Q6H PRN Stella Greene MD      pantoprazole  40 mg Intravenous Q24H [address] Mariaa Mcclellan PA-C      phenol  1 spray Mouth/Throat Q2H PRN Stella Greene MD          Today, Patient Was Seen By: Sophy Benitez MD    ** Please Note: Dictation voice to text software may have been used in the creation of this document.  **

## 2023-12-08 NOTE — PLAN OF CARE
Problem: PAIN - ADULT  Goal: Verbalizes/displays adequate comfort level or baseline comfort level  Description: Interventions:  - Encourage patient to monitor pain and request assistance  - Assess pain using appropriate pain scale  - Administer analgesics based on type and severity of pain and evaluate response  - Implement non-pharmacological measures as appropriate and evaluate response  - Consider cultural and social influences on pain and pain management  - Notify physician/advanced practitioner if interventions unsuccessful or patient reports new pain  Outcome: Progressing     Problem: INFECTION - ADULT  Goal: Absence or prevention of progression during hospitalization  Description: INTERVENTIONS:  - Assess and monitor for signs and symptoms of infection  - Monitor lab/diagnostic results  - Monitor all insertion sites, i.e. indwelling lines, tubes, and drains  - Monitor endotracheal if appropriate and nasal secretions for changes in amount and color  - [place] appropriate cooling/warming therapies per order  - Administer medications as ordered  - Instruct and encourage patient and family to use good hand hygiene technique  - Identify and instruct in appropriate isolation precautions for identified infection/condition  Outcome: Progressing     Problem: SAFETY ADULT  Goal: Patient will remain free of falls  Description: INTERVENTIONS:  - Educate patient/family on patient safety including physical limitations  - Instruct patient to call for assistance with activity   - Consult OT/PT to assist with strengthening/mobility   - Keep Call bell within reach  - Keep bed low and locked with side rails adjusted as appropriate  - Keep care items and personal belongings within reach  - Initiate and maintain comfort rounds  - Make Fall Risk Sign visible to staff  - Offer Toileting every 2 Hours, in advance of need  - Initiate/Maintain fall alarm  - Obtain necessary fall risk management equipment: non-skid footwear  - Apply yellow socks and bracelet for high fall risk patients  - Consider moving patient to room near nurses station  Outcome: Progressing  Goal: Maintain or return to baseline ADL function  Description: INTERVENTIONS:  -  Assess patient's ability to carry out ADLs; assess patient's baseline for ADL function and identify physical deficits which impact ability to perform ADLs (bathing, care of mouth/teeth, toileting, grooming, dressing, etc.)  - Assess/evaluate cause of self-care deficits   - Assess range of motion  - Assess patient's mobility; develop plan if impaired  - Assess patient's need for assistive devices and provide as appropriate  - Encourage maximum independence but intervene and supervise when necessary  - Involve family in performance of ADLs  - Assess for home care needs following discharge   - Consider OT consult to assist with ADL evaluation and planning for discharge  - Provide patient education as appropriate  Outcome: Progressing  Goal: Maintains/Returns to pre admission functional level  Description: INTERVENTIONS:  - Perform AM-PAC 6 Click Basic Mobility/ Daily Activity assessment daily.  - Set and communicate daily mobility goal to care team and patient/family/caregiver. - Collaborate with rehabilitation services on mobility goals if consulted  - Perform Range of Motion 3 times a day. - Reposition patient every 2 hours.   - Dangle patient 3 times a day  - Stand patient 3 times a day  - Ambulate patient 3 times a day  - Out of bed to chair 3 times a day   - Out of bed for meals 3 times a day  - Out of bed for toileting  - Record patient progress and toleration of activity level   Outcome: Progressing     Problem: DISCHARGE PLANNING  Goal: Discharge to home or other facility with appropriate resources  Description: INTERVENTIONS:  - Identify barriers to discharge w/patient and caregiver  - Arrange for needed discharge resources and transportation as appropriate  - Identify discharge learning needs (meds, wound care, etc.)  - Arrange for interpretive services to assist at discharge as needed  - Refer to Case Management Department for coordinating discharge planning if the patient needs post-hospital services based on physician/advanced practitioner order or complex needs related to functional status, cognitive ability, or social support system  Outcome: Progressing     Problem: Knowledge Deficit  Goal: Patient/family/caregiver demonstrates understanding of disease process, treatment plan, medications, and discharge instructions  Description: Complete learning assessment and assess knowledge base.   Interventions:  - Provide teaching at level of understanding  - Provide teaching via preferred learning methods  Outcome: Progressing     Problem: GASTROINTESTINAL - ADULT  Goal: Minimal or absence of nausea and/or vomiting  Description: INTERVENTIONS:  - Administer IV fluids if ordered to ensure adequate hydration  - Maintain NPO status until nausea and vomiting are resolved  - Nasogastric tube if ordered  - Administer ordered antiemetic medications as needed  - Provide nonpharmacologic comfort measures as appropriate  - Advance diet as tolerated, if ordered  - Consider nutrition services referral to assist patient with adequate nutrition and appropriate food choices  Outcome: Progressing  Goal: Maintains or returns to baseline bowel function  Description: INTERVENTIONS:  - Assess bowel function  - Encourage oral fluids to ensure adequate hydration  - Administer IV fluids if ordered to ensure adequate hydration  - Administer ordered medications as needed  - Encourage mobilization and activity  - Consider nutritional services referral to assist patient with adequate nutrition and appropriate food choices  Outcome: Progressing  Goal: Maintains adequate nutritional intake  Description: INTERVENTIONS:  - Monitor percentage of each meal consumed  - Identify factors contributing to decreased intake, treat as appropriate  - Assist with meals as needed  - Monitor I&O, weight, and lab values if indicated  - Obtain nutrition services referral as needed  Outcome: Progressing  Goal: Oral mucous membranes remain intact  Description: INTERVENTIONS  - Assess oral mucosa and hygiene practices  - Implement preventative oral hygiene regimen  - Implement oral medicated treatments as ordered  - Initiate Nutrition services referral as needed  Outcome: Progressing     Problem: GENITOURINARY - ADULT  Goal: Maintains or returns to baseline urinary function  Description: INTERVENTIONS:  - Assess urinary function  - Encourage oral fluids to ensure adequate hydration if ordered  - Administer IV fluids as ordered to ensure adequate hydration  - Administer ordered medications as needed  - Offer frequent toileting  - Follow urinary retention protocol if ordered  Outcome: Progressing  Goal: Absence of urinary retention  Description: INTERVENTIONS:  - Assess patient’s ability to void and empty bladder  - Monitor I/O  - Bladder scan as needed  - Discuss with physician/AP medications to alleviate retention as needed  - Discuss catheterization for long term situations as appropriate  Outcome: Progressing     Problem: METABOLIC, FLUID AND ELECTROLYTES - ADULT  Goal: Electrolytes maintained within normal limits  Description: INTERVENTIONS:  - Monitor labs and assess patient for signs and symptoms of electrolyte imbalances  - Administer electrolyte replacement as ordered  - Monitor response to electrolyte replacements, including repeat lab results as appropriate  - Instruct patient on fluid and nutrition as appropriate  Outcome: Progressing  Goal: Fluid balance maintained  Description: INTERVENTIONS:  - Monitor labs   - Monitor I/O and WT  - Instruct patient on fluid and nutrition as appropriate  - Assess for signs & symptoms of volume excess or deficit  Outcome: Progressing  Goal: Glucose maintained within target range  Description: INTERVENTIONS:  - Monitor Blood Glucose as ordered  - Assess for signs and symptoms of hyperglycemia and hypoglycemia  - Administer ordered medications to maintain glucose within target range  - Assess nutritional intake and initiate nutrition service referral as needed  Outcome: Progressing     Problem: HEMATOLOGIC - ADULT  Goal: Maintains hematologic stability  Description: INTERVENTIONS  - Assess for signs and symptoms of bleeding or hemorrhage  - Monitor labs  - Administer supportive blood products/factors as ordered and appropriate  Outcome: Progressing     Problem: MUSCULOSKELETAL - ADULT  Goal: Maintain or return mobility to safest level of function  Description: INTERVENTIONS:  - Assess patient's ability to carry out ADLs; assess patient's baseline for ADL function and identify physical deficits which impact ability to perform ADLs (bathing, care of mouth/teeth, toileting, grooming, dressing, etc.)  - Assess/evaluate cause of self-care deficits   - Assess range of motion  - Assess patient's mobility  - Assess patient's need for assistive devices and provide as appropriate  - Encourage maximum independence but intervene and supervise when necessary  - Involve family in performance of ADLs  - Assess for home care needs following discharge   - Consider OT consult to assist with ADL evaluation and planning for discharge  - Provide patient education as appropriate  Outcome: Progressing  Goal: Maintain proper alignment of affected body part  Description: INTERVENTIONS:  - Support, maintain and protect limb and body alignment  - Provide patient/ family with appropriate education  Outcome: Progressing

## 2023-12-09 LAB
ANION GAP SERPL CALCULATED.3IONS-SCNC: 7 MMOL/L
BASOPHILS # BLD AUTO: 0.03 THOUSANDS/ÂΜL (ref 0–0.1)
BASOPHILS NFR BLD AUTO: 0 % (ref 0–1)
BUN SERPL-MCNC: 13 MG/DL (ref 5–25)
CALCIUM SERPL-MCNC: 8.8 MG/DL (ref 8.4–10.2)
CHLORIDE SERPL-SCNC: 103 MMOL/L (ref 96–108)
CO2 SERPL-SCNC: 26 MMOL/L (ref 21–32)
CREAT SERPL-MCNC: 0.81 MG/DL (ref 0.6–1.3)
EOSINOPHIL # BLD AUTO: 0.04 THOUSAND/ÂΜL (ref 0–0.61)
EOSINOPHIL NFR BLD AUTO: 1 % (ref 0–6)
ERYTHROCYTE [DISTWIDTH] IN BLOOD BY AUTOMATED COUNT: 15.3 % (ref 11.6–15.1)
GFR SERPL CREATININE-BSD FRML MDRD: 105 ML/MIN/1.73SQ M
GLUCOSE SERPL-MCNC: 98 MG/DL (ref 65–140)
HCT VFR BLD AUTO: 39.4 % (ref 36.5–49.3)
HGB BLD-MCNC: 13.1 G/DL (ref 12–17)
IMM GRANULOCYTES # BLD AUTO: 0.12 THOUSAND/UL (ref 0–0.2)
IMM GRANULOCYTES NFR BLD AUTO: 2 % (ref 0–2)
LYMPHOCYTES # BLD AUTO: 0.71 THOUSANDS/ÂΜL (ref 0.6–4.47)
LYMPHOCYTES NFR BLD AUTO: 10 % (ref 14–44)
MCH RBC QN AUTO: 27.3 PG (ref 26.8–34.3)
MCHC RBC AUTO-ENTMCNC: 33.2 G/DL (ref 31.4–37.4)
MCV RBC AUTO: 82 FL (ref 82–98)
MONOCYTES # BLD AUTO: 0.57 THOUSAND/ÂΜL (ref 0.17–1.22)
MONOCYTES NFR BLD AUTO: 8 % (ref 4–12)
NEUTROPHILS # BLD AUTO: 5.33 THOUSANDS/ÂΜL (ref 1.85–7.62)
NEUTS SEG NFR BLD AUTO: 79 % (ref 43–75)
NRBC BLD AUTO-RTO: 0 /100 WBCS
PLATELET # BLD AUTO: 274 THOUSANDS/UL (ref 149–390)
PMV BLD AUTO: 9 FL (ref 8.9–12.7)
POTASSIUM SERPL-SCNC: 4 MMOL/L (ref 3.5–5.3)
RBC # BLD AUTO: 4.8 MILLION/UL (ref 3.88–5.62)
SODIUM SERPL-SCNC: 136 MMOL/L (ref 135–147)
WBC # BLD AUTO: 6.8 THOUSAND/UL (ref 4.31–10.16)

## 2023-12-09 PROCEDURE — 85025 COMPLETE CBC W/AUTO DIFF WBC: CPT

## 2023-12-09 PROCEDURE — 80048 BASIC METABOLIC PNL TOTAL CA: CPT

## 2023-12-09 PROCEDURE — C9113 INJ PANTOPRAZOLE SODIUM, VIA: HCPCS | Performed by: PHYSICIAN ASSISTANT

## 2023-12-09 PROCEDURE — 99232 SBSQ HOSP IP/OBS MODERATE 35: CPT | Performed by: FAMILY MEDICINE

## 2023-12-09 RX ORDER — PREDNISONE 20 MG/1
40 TABLET ORAL DAILY
Status: DISCONTINUED | OUTPATIENT
Start: 2023-12-09 | End: 2023-12-10 | Stop reason: HOSPADM

## 2023-12-09 RX ORDER — ACETAMINOPHEN 325 MG/1
650 TABLET ORAL EVERY 6 HOURS PRN
Status: DISCONTINUED | OUTPATIENT
Start: 2023-12-09 | End: 2023-12-10 | Stop reason: HOSPADM

## 2023-12-09 RX ADMIN — ACETAMINOPHEN 650 MG: 325 TABLET, FILM COATED ORAL at 17:44

## 2023-12-09 RX ADMIN — ENOXAPARIN SODIUM 40 MG: 40 INJECTION SUBCUTANEOUS at 09:01

## 2023-12-09 RX ADMIN — MORPHINE SULFATE 4 MG: 4 INJECTION, SOLUTION INTRAMUSCULAR; INTRAVENOUS at 01:00

## 2023-12-09 RX ADMIN — PREDNISONE 40 MG: 20 TABLET ORAL at 15:16

## 2023-12-09 RX ADMIN — MORPHINE SULFATE 2 MG: 2 INJECTION, SOLUTION INTRAMUSCULAR; INTRAVENOUS at 09:10

## 2023-12-09 RX ADMIN — PANTOPRAZOLE SODIUM 40 MG: 40 INJECTION, POWDER, LYOPHILIZED, FOR SOLUTION INTRAVENOUS at 09:01

## 2023-12-09 RX ADMIN — METHYLPREDNISOLONE SODIUM SUCCINATE 20 MG: 40 INJECTION, POWDER, FOR SOLUTION INTRAMUSCULAR; INTRAVENOUS at 05:46

## 2023-12-09 RX ADMIN — AMLODIPINE BESYLATE 2.5 MG: 2.5 TABLET ORAL at 09:04

## 2023-12-09 NOTE — PROGRESS NOTES
[address]  Progress Note  Name: Bandar Hopkins  MRN: [id number]  Unit/Bed#: 229-15 I Date of Admission: 12/5/2023   Date of Service: 12/9/2023 I Hospital Day: 4    Assessment/Plan   * SBO (small bowel obstruction) ([address] [facility])  Assessment & Plan  Hx of SBO secondary to Crohn' disesase  PMH of Crohn's disease, complicated by fibrostenotic region, s/p R hemicolectomy w anastomosis 2012,     Presented to the ER for worsening abdominal pain, nausea, vomiting since last night after eating. ER course:  IVF, pain ctrl, NPO, NG tube    CT scan 11/05/2023: Distended loops of small bowel compatible with small bowel obstruction. Prior partial colectomy. Small ascites in the abdomen and mesentery. Per surgery - "No acute surgical intervention warranted at this time"    XR abdomen obstruction series [[id number]]    12/08/23 0749  Significantly improved small bowel distention. Contrast noted in colon. Resolving SBO, diet advanced to surgical soft  Hopeful discharge tomorrow  Continue treatment for Crohn's exacerbation, Solu-Medrol changed to prednisone  Appreciate general surgery input    Crohn's colitis, other complication [facility])  Assessment & Plan  PMH of Crohn's disease, complicated by fibrostenotic region, s/p R hemicolectomy w anastomosis 2012,   Recent admission for Crohn's flare,   - CT scan 11/22/2023: possible SBO, enteritis, colitis. - Colonoscopy 11/24/2023: ulcer in colon, regions of severe colitis. - Was discharged on prednisone taper, Rinvoq 45 mg daily, states he is compliant.  - On IV solumedrol 20 mg TID in the meantime - now transitioned to prednisone 40 mg daily, plan for prednisone taper on discharge          Right kidney mass  Assessment & Plan  CT abdomen and pelvis 12/05/2023: Heterogeneously enhancing mass within the right kidney most concerning for renal cell carcinoma.    Urology consult appreciated  O/p f/u w urology in the office within 1-2 week(s) after d/c VTE Pharmacologic Prophylaxis: VTE Score: 5 High Risk (Score >/= 5) - Pharmacological DVT Prophylaxis Ordered: enoxaparin (Lovenox). Sequential Compression Devices Ordered. Mobility:   Basic Mobility Inpatient Raw Score: 24  JH-HLM Goal: 8: Walk 250 feet or more  JH-HLM Achieved: 8: Walk 250 feet ot more  HLM Goal achieved. Continue to encourage appropriate mobility. Patient Centered Rounds: I performed bedside rounds with nursing staff today. Discussions with Specialists or Other Care Team Provider: SHAWN    Education and Discussions with Family / Patient:  patient. Total Time Spent on Date of Encounter in care of patient: 50 mins. This time was spent on one or more of the following: performing physical exam; counseling and coordination of care; obtaining or reviewing history; documenting in the medical record; reviewing/ordering tests, medications or procedures; communicating with other healthcare professionals and discussing with patient's family/caregivers. Current Length of Stay: 4 day(s)  Current Patient Status: Inpatient   Certification Statement: The patient will continue to require additional inpatient hospital stay due to close monitoring  Discharge Plan: Anticipate discharge tomorrow to home. Code Status: Level 1 - Full Code    Subjective:   Patient reports feeling well. He states that he feels that he regained function of his bowels. He hopes that his diet is advanced    Objective:     Vitals:   Temp (24hrs), Av.6 °F (36.4 °C), Min:97.2 °F (36.2 °C), Max:98 °F (36.7 °C)    Temp:  [97.2 °F (36.2 °C)-98 °F (36.7 °C)] 98 °F (36.7 °C)  HR:  [61-63] 63  Resp:  [14-19] 18  BP: (121-140)/(79-88) 138/86  SpO2:  [93 %-97 %] 93 %  Body mass index is 25.36 kg/m². Input and Output Summary (last 24 hours):      Intake/Output Summary (Last 24 hours) at 2023 1547  Last data filed at 2023 1218  Gross per 24 hour   Intake 1120 ml   Output --   Net 1120 ml       Physical Exam: Physical Exam  Constitutional:       General: He is not in acute distress. HENT:      Head: Normocephalic and atraumatic. Nose: No congestion. Eyes:      Conjunctiva/sclera: Conjunctivae normal.   Cardiovascular:      Rate and Rhythm: Normal rate and regular rhythm. Pulmonary:      Effort: No respiratory distress. Breath sounds: No wheezing or rales. Abdominal:      General: There is no distension. Palpations: Abdomen is soft. Tenderness: There is no abdominal tenderness. Musculoskeletal:      Right lower leg: No edema. Left lower leg: No edema. Skin:     General: Skin is dry. Neurological:      Mental Status: He is oriented to person, place, and time. Additional Data:     Labs:  Results from last 7 days   Lab Units 12/09/23  0547   WBC Thousand/uL 6.80   HEMOGLOBIN g/dL 13.1   HEMATOCRIT % 39.4   PLATELETS Thousands/uL 274   NEUTROS PCT % 79*   LYMPHS PCT % 10*   MONOS PCT % 8   EOS PCT % 1     Results from last 7 days   Lab Units 12/09/23  0547 12/08/23  0620   SODIUM mmol/L 136 137   POTASSIUM mmol/L 4.0 4.1   CHLORIDE mmol/L 103 103   CO2 mmol/L 26 28   BUN mg/dL 13 17   CREATININE mg/dL 0.81 0.85   ANION GAP mmol/L 7 6   CALCIUM mg/dL 8.8 8.6   ALBUMIN g/dL  --  3.6   TOTAL BILIRUBIN mg/dL  --  0.58   ALK PHOS U/L  --  62   ALT U/L  --  19   AST U/L  --  9*   GLUCOSE RANDOM mg/dL 98 121                 Results from last 7 days   Lab Units 12/06/23  0435 12/05/23  1317 12/05/23  0613   LACTIC ACID mmol/L  --  1.6 2.3*   PROCALCITONIN ng/ml <0.05  --   --        Lines/Drains:  Invasive Devices       Peripheral Intravenous Line  Duration             Peripheral IV 12/09/23 Dorsal (posterior); Left Hand <1 day                      Imaging: no new     Recent Cultures (last 7 days):         Last 24 Hours Medication List:   Current Facility-Administered Medications   Medication Dose Route Frequency Provider Last Rate    acetaminophen  650 mg Oral Q6H PRN Taya Vital DO Lissette      amLODIPine  2.5 mg Oral Daily Jose Carlos Hamilton MD      enoxaparin  40 mg Subcutaneous Daily Galilea Pope MD      HYDROmorphone  0.5 mg Intravenous Q3H PRN Mike Burciaga MD      iohexol  50 mL Oral Once in imaging Mike Burciaga MD      labetalol  10 mg Intravenous Q4H PRN Mike Burciaga MD      ondansetron  4 mg Intravenous Q6H PRN Galilea Pope MD      pantoprazole  40 mg Intravenous Q24H [address] Mariaa Mcclellan PA-C      phenol  1 spray Mouth/Throat Q2H PRN Galilea Pope MD      predniSONE  40 mg Oral Daily Mike Burciaga MD          Today, Patient Was Seen By: Mike Burciaga MD    **Please Note: This note may have been constructed using a voice recognition system. **

## 2023-12-09 NOTE — ASSESSMENT & PLAN NOTE
Hx of SBO secondary to Crohn' disesase  PMH of Crohn's disease, complicated by fibrostenotic region, s/p R hemicolectomy w anastomosis 2012,     Presented to the ER for worsening abdominal pain, nausea, vomiting since last night after eating. ER course:  IVF, pain ctrl, NPO, NG tube    CT scan 11/05/2023: Distended loops of small bowel compatible with small bowel obstruction. Prior partial colectomy. Small ascites in the abdomen and mesentery. Per surgery - "No acute surgical intervention warranted at this time"    XR abdomen obstruction series [[id number]]    12/08/23 0749  Significantly improved small bowel distention. Contrast noted in colon.     Resolving SBO, diet advanced to surgical soft  Hopeful discharge tomorrow  Continue treatment for Crohn's exacerbation, Solu-Medrol changed to prednisone  Appreciate general surgery input

## 2023-12-09 NOTE — PROGRESS NOTES
Progress Note - General Surgery   Ochoa Cueto 50 y.o. male MRN: [id number]  Unit/Bed#: 448-22 Encounter: [id number]    Assessment:  25-year-old male with a history of Crohn's disease status post surgical intervention in the past for stricture and partial colectomy who is currently admitted due to small bowel obstruction. The obstruction is resolving. The patient is continuing to pass flatus and have bowel movements. He tolerated clear liquids without difficulty. Leukocytosis has resolved    Plan: Will advance to a surgical soft diet, may decrease diet if not tolerated  Avoid narcotic pain medication  Out of bed and ambulate  If diet tolerated, possible stable for discharge tomorrow      Subjective/Objective       Subjective: The patient is doing well. He denies any nausea or vomiting. He did have some slight pain however, this was not significant. Continues to have bowel movements and passed flatus. Tolerated clear liquids without difficulty and is hungry. Objective:     Blood pressure 140/86, pulse 63, temperature (!) 97.2 °F (36.2 °C), resp. rate 14, height 6' 1" (1.854 m), weight 87.2 kg (192 lb 3.2 oz), SpO2 97 %. ,Body mass index is 25.36 kg/m². Intake/Output Summary (Last 24 hours) at 12/9/2023 1131  Last data filed at 12/9/2023 0910  Gross per 24 hour   Intake 1605 ml   Output --   Net 1605 ml       Invasive Devices       Peripheral Intravenous Line  Duration             Peripheral IV 12/09/23 Dorsal (posterior); Left Hand <1 day                    Physical Exam: General appearance: alert and oriented, in no acute distress  Head: Normocephalic, without obvious abnormality, atraumatic  Abdomen: soft, non-tender; bowel sounds normal; no masses,  no organomegaly and positive reducible umbilical hernia, well-healed right paramedian scar  Extremities: extremities normal, warm and well-perfused; no cyanosis, clubbing, or edema  Skin: Skin color, texture, turgor normal. No rashes or lesions  Neurologic: Grossly normal    Lab, Imaging and other studies:CBC:   Lab Results   Component Value Date    WBC 6.80 12/09/2023    HGB 13.1 12/09/2023    HCT 39.4 12/09/2023    MCV 82 12/09/2023     12/09/2023    RBC 4.80 12/09/2023    MCH 27.3 12/09/2023    MCHC 33.2 12/09/2023    RDW 15.3 (H) 12/09/2023    MPV 9.0 12/09/2023    NRBC 0 12/09/2023   , CMP:   Lab Results   Component Value Date    SODIUM 136 12/09/2023    K 4.0 12/09/2023     12/09/2023    CO2 26 12/09/2023    BUN 13 12/09/2023    CREATININE 0.81 12/09/2023    CALCIUM 8.8 12/09/2023    EGFR 105 12/09/2023     VTE Pharmacologic Prophylaxis: Enoxaparin (Lovenox)  VTE Mechanical Prophylaxis: sequential compression device

## 2023-12-09 NOTE — PLAN OF CARE
Problem: PAIN - ADULT  Goal: Verbalizes/displays adequate comfort level or baseline comfort level  Description: Interventions:  - Encourage patient to monitor pain and request assistance  - Assess pain using appropriate pain scale  - Administer analgesics based on type and severity of pain and evaluate response  - Implement non-pharmacological measures as appropriate and evaluate response  - Consider cultural and social influences on pain and pain management  - Notify physician/advanced practitioner if interventions unsuccessful or patient reports new pain  Outcome: Progressing     Problem: INFECTION - ADULT  Goal: Absence or prevention of progression during hospitalization  Description: INTERVENTIONS:  - Assess and monitor for signs and symptoms of infection  - Monitor lab/diagnostic results  - Monitor all insertion sites, i.e. indwelling lines, tubes, and drains  - Monitor endotracheal if appropriate and nasal secretions for changes in amount and color  - Wingate appropriate cooling/warming therapies per order  - Administer medications as ordered  - Instruct and encourage patient and family to use good hand hygiene technique  - Identify and instruct in appropriate isolation precautions for identified infection/condition  Outcome: Progressing     Problem: SAFETY ADULT  Goal: Patient will remain free of falls  Description: INTERVENTIONS:  - Educate patient/family on patient safety including physical limitations  - Instruct patient to call for assistance with activity   - Consult OT/PT to assist with strengthening/mobility   - Keep Call bell within reach  - Keep bed low and locked with side rails adjusted as appropriate  - Keep care items and personal belongings within reach  - Initiate and maintain comfort rounds  - Make Fall Risk Sign visible to staff  - Offer Toileting every 2 Hours, in advance of need  - Initiate/Maintain fall alarm  - Obtain necessary fall risk management equipment: non-skid footwear  - Apply yellow socks and bracelet for high fall risk patients  - Consider moving patient to room near nurses station  Outcome: Progressing  Goal: Maintain or return to baseline ADL function  Description: INTERVENTIONS:  -  Assess patient's ability to carry out ADLs; assess patient's baseline for ADL function and identify physical deficits which impact ability to perform ADLs (bathing, care of mouth/teeth, toileting, grooming, dressing, etc.)  - Assess/evaluate cause of self-care deficits   - Assess range of motion  - Assess patient's mobility; develop plan if impaired  - Assess patient's need for assistive devices and provide as appropriate  - Encourage maximum independence but intervene and supervise when necessary  - Involve family in performance of ADLs  - Assess for home care needs following discharge   - Consider OT consult to assist with ADL evaluation and planning for discharge  - Provide patient education as appropriate  Outcome: Progressing  Goal: Maintains/Returns to pre admission functional level  Description: INTERVENTIONS:  - Perform AM-PAC 6 Click Basic Mobility/ Daily Activity assessment daily.  - Set and communicate daily mobility goal to care team and patient/family/caregiver. - Collaborate with rehabilitation services on mobility goals if consulted  - Perform Range of Motion 3 times a day. - Reposition patient every 2 hours.   - Dangle patient 3 times a day  - Stand patient 3 times a day  - Ambulate patient 3 times a day  - Out of bed to chair 3 times a day   - Out of bed for meals 3 times a day  - Out of bed for toileting  - Record patient progress and toleration of activity level   Outcome: Progressing     Problem: DISCHARGE PLANNING  Goal: Discharge to home or other facility with appropriate resources  Description: INTERVENTIONS:  - Identify barriers to discharge w/patient and caregiver  - Arrange for needed discharge resources and transportation as appropriate  - Identify discharge learning needs (meds, wound care, etc.)  - Arrange for interpretive services to assist at discharge as needed  - Refer to Case Management Department for coordinating discharge planning if the patient needs post-hospital services based on physician/advanced practitioner order or complex needs related to functional status, cognitive ability, or social support system  Outcome: Progressing     Problem: Knowledge Deficit  Goal: Patient/family/caregiver demonstrates understanding of disease process, treatment plan, medications, and discharge instructions  Description: Complete learning assessment and assess knowledge base.   Interventions:  - Provide teaching at level of understanding  - Provide teaching via preferred learning methods  Outcome: Progressing     Problem: GASTROINTESTINAL - ADULT  Goal: Minimal or absence of nausea and/or vomiting  Description: INTERVENTIONS:  - Administer IV fluids if ordered to ensure adequate hydration  - Maintain NPO status until nausea and vomiting are resolved  - Nasogastric tube if ordered  - Administer ordered antiemetic medications as needed  - Provide nonpharmacologic comfort measures as appropriate  - Advance diet as tolerated, if ordered  - Consider nutrition services referral to assist patient with adequate nutrition and appropriate food choices  Outcome: Progressing  Goal: Maintains or returns to baseline bowel function  Description: INTERVENTIONS:  - Assess bowel function  - Encourage oral fluids to ensure adequate hydration  - Administer IV fluids if ordered to ensure adequate hydration  - Administer ordered medications as needed  - Encourage mobilization and activity  - Consider nutritional services referral to assist patient with adequate nutrition and appropriate food choices  Outcome: Progressing  Goal: Maintains adequate nutritional intake  Description: INTERVENTIONS:  - Monitor percentage of each meal consumed  - Identify factors contributing to decreased intake, treat as appropriate  - Assist with meals as needed  - Monitor I&O, weight, and lab values if indicated  - Obtain nutrition services referral as needed  Outcome: Progressing  Goal: Oral mucous membranes remain intact  Description: INTERVENTIONS  - Assess oral mucosa and hygiene practices  - Implement preventative oral hygiene regimen  - Implement oral medicated treatments as ordered  - Initiate Nutrition services referral as needed  Outcome: Progressing     Problem: GENITOURINARY - ADULT  Goal: Maintains or returns to baseline urinary function  Description: INTERVENTIONS:  - Assess urinary function  - Encourage oral fluids to ensure adequate hydration if ordered  - Administer IV fluids as ordered to ensure adequate hydration  - Administer ordered medications as needed  - Offer frequent toileting  - Follow urinary retention protocol if ordered  Outcome: Progressing  Goal: Absence of urinary retention  Description: INTERVENTIONS:  - Assess patient’s ability to void and empty bladder  - Monitor I/O  - Bladder scan as needed  - Discuss with physician/AP medications to alleviate retention as needed  - Discuss catheterization for long term situations as appropriate  Outcome: Progressing     Problem: METABOLIC, FLUID AND ELECTROLYTES - ADULT  Goal: Electrolytes maintained within normal limits  Description: INTERVENTIONS:  - Monitor labs and assess patient for signs and symptoms of electrolyte imbalances  - Administer electrolyte replacement as ordered  - Monitor response to electrolyte replacements, including repeat lab results as appropriate  - Instruct patient on fluid and nutrition as appropriate  Outcome: Progressing  Goal: Fluid balance maintained  Description: INTERVENTIONS:  - Monitor labs   - Monitor I/O and WT  - Instruct patient on fluid and nutrition as appropriate  - Assess for signs & symptoms of volume excess or deficit  Outcome: Progressing  Goal: Glucose maintained within target range  Description: INTERVENTIONS:  - Monitor Blood Glucose as ordered  - Assess for signs and symptoms of hyperglycemia and hypoglycemia  - Administer ordered medications to maintain glucose within target range  - Assess nutritional intake and initiate nutrition service referral as needed  Outcome: Progressing     Problem: HEMATOLOGIC - ADULT  Goal: Maintains hematologic stability  Description: INTERVENTIONS  - Assess for signs and symptoms of bleeding or hemorrhage  - Monitor labs  - Administer supportive blood products/factors as ordered and appropriate  Outcome: Progressing     Problem: MUSCULOSKELETAL - ADULT  Goal: Maintain or return mobility to safest level of function  Description: INTERVENTIONS:  - Assess patient's ability to carry out ADLs; assess patient's baseline for ADL function and identify physical deficits which impact ability to perform ADLs (bathing, care of mouth/teeth, toileting, grooming, dressing, etc.)  - Assess/evaluate cause of self-care deficits   - Assess range of motion  - Assess patient's mobility  - Assess patient's need for assistive devices and provide as appropriate  - Encourage maximum independence but intervene and supervise when necessary  - Involve family in performance of ADLs  - Assess for home care needs following discharge   - Consider OT consult to assist with ADL evaluation and planning for discharge  - Provide patient education as appropriate  Outcome: Progressing  Goal: Maintain proper alignment of affected body part  Description: INTERVENTIONS:  - Support, maintain and protect limb and body alignment  - Provide patient/ family with appropriate education  Outcome: Progressing

## 2023-12-09 NOTE — ASSESSMENT & PLAN NOTE
PMH of Crohn's disease, complicated by fibrostenotic region, s/p R hemicolectomy w anastomosis 2012,   Recent admission for Crohn's flare,   - CT scan 11/22/2023: possible SBO, enteritis, colitis. - Colonoscopy 11/24/2023: ulcer in colon, regions of severe colitis.    - Was discharged on prednisone taper, Rinvoq 45 mg daily, states he is compliant.  - On IV solumedrol 20 mg TID in the meantime - now transitioned to prednisone 40 mg daily, plan for prednisone taper on discharge

## 2023-12-09 NOTE — PLAN OF CARE
Problem: PAIN - ADULT  Goal: Verbalizes/displays adequate comfort level or baseline comfort level  Description: Interventions:  - Encourage patient to monitor pain and request assistance  - Assess pain using appropriate pain scale (0-10 pain scale)  - Administer analgesics based on type and severity of pain and evaluate response  - Implement non-pharmacological measures as appropriate and evaluate response  - Consider cultural and social influences on pain and pain management  - Notify physician/advanced practitioner if interventions unsuccessful or patient reports new pain  Outcome: Progressing     Problem: INFECTION - ADULT  Goal: Absence or prevention of progression during hospitalization  Description: INTERVENTIONS:  - Assess and monitor for signs and symptoms of infection  - Monitor lab/diagnostic results  - Monitor all insertion sites, i.e. indwelling lines  - Administer medications as ordered  - Instruct and encourage patient and family to use good hand hygiene technique  Outcome: Progressing     Problem: SAFETY ADULT  Goal: Patient will remain free of falls  Description: INTERVENTIONS:  - Educate patient/family on patient safety including physical limitations  - Instruct patient to call for assistance with activity (independent)  - Consult OT/PT to assist with strengthening/mobility   - Keep Call bell within reach  - Keep bed low and locked with side rails adjusted as appropriate  - Keep care items and personal belongings within reach  - Initiate and maintain comfort rounds  - Make Fall Risk Sign visible to staff (low fall risk)  - Offer Toileting every 1-2 Hours, in advance of need  - Obtain necessary fall risk management equipment: nonskid footwear  Outcome: Progressing  Goal: Maintain or return to baseline ADL function  Description: INTERVENTIONS:  -  Assess patient's ability to carry out ADLs; (independent)  - Assess/evaluate cause of self-care deficits   - Assess range of motion  - Assess patient's mobility; (independent)  - Assess patient's need for assistive devices and provide as appropriate  - Encourage maximum independence but intervene and supervise when necessary  - Assess for home care needs following discharge   - Consider OT consult to assist with ADL evaluation and planning for discharge  - Provide patient education as appropriate  Outcome: Progressing  Goal: Maintains/Returns to pre admission functional level  Description: INTERVENTIONS:  - Perform AM-PAC 6 Click Basic Mobility/ Daily Activity assessment daily.  - Set and communicate daily mobility goal to care team and patient/family/caregiver. - Collaborate with rehabilitation services on mobility goals if consulted  - Ambulate patient 3-6 times a day  - Out of bed to chair 3 times a day   - Out of bed for meals 3 times a day  - Out of bed for toileting  - Record patient progress and toleration of activity level   Outcome: Progressing     Problem: DISCHARGE PLANNING  Goal: Discharge to home or other facility with appropriate resources  Description: INTERVENTIONS:  - Identify barriers to discharge w/patient and caregiver  - Arrange for needed discharge resources and transportation as appropriate  - Identify discharge learning needs (meds, wound care, etc.)  - Refer to Case Management Department for coordinating discharge planning if the patient needs post-hospital services based on physician/advanced practitioner order or complex needs related to functional status, cognitive ability, or social support system  Outcome: Progressing     Problem: Knowledge Deficit  Goal: Patient/family/caregiver demonstrates understanding of disease process, treatment plan, medications, and discharge instructions  Description: Complete learning assessment and assess knowledge base.   Interventions:  - Provide teaching at level of understanding  - Provide teaching via preferred learning methods  Outcome: Progressing     Problem: GASTROINTESTINAL - ADULT  Goal: Minimal or absence of nausea and/or vomiting  Description: INTERVENTIONS:  - Administer IV fluids if ordered to ensure adequate hydration  - Maintain NPO status until nausea and vomiting are resolved  - Administer ordered antiemetic medications as needed  - Provide nonpharmacologic comfort measures as appropriate  - Advance diet as tolerated, if ordered  - Consider nutrition services referral to assist patient with adequate nutrition and appropriate food choices  Outcome: Progressing  Goal: Maintains or returns to baseline bowel function  Description: INTERVENTIONS:  - Assess bowel function  - Encourage oral fluids to ensure adequate hydration  - Administer IV fluids if ordered to ensure adequate hydration  - Administer ordered medications as needed  - Encourage mobilization and activity  - Consider nutritional services referral to assist patient with adequate nutrition and appropriate food choices  Outcome: Progressing  Goal: Maintains adequate nutritional intake  Description: INTERVENTIONS:  - Monitor percentage of each meal consumed  - Identify factors contributing to decreased intake, treat as appropriate  - Assist with meals as needed  - Monitor I&O, weight, and lab values if indicated  - Obtain nutrition services referral as needed  Outcome: Progressing  Goal: Oral mucous membranes remain intact  Description: INTERVENTIONS  - Assess oral mucosa and hygiene practices  - Implement preventative oral hygiene regimen  - Implement oral medicated treatments as ordered  - Initiate Nutrition services referral as needed  Outcome: Progressing     Problem: GENITOURINARY - ADULT  Goal: Maintains or returns to baseline urinary function  Description: INTERVENTIONS:  - Assess urinary function  - Encourage oral fluids to ensure adequate hydration if ordered  - Administer IV fluids as ordered to ensure adequate hydration  - Administer ordered medications as needed  - Offer frequent toileting  - Follow urinary retention protocol if ordered  Outcome: Completed  Goal: Absence of urinary retention  Description: INTERVENTIONS:  - Assess patient’s ability to void and empty bladder  - Monitor I/O  - Bladder scan as needed  - Discuss with physician/AP medications to alleviate retention as needed  - Discuss catheterization for long term situations as appropriate  Outcome: Completed     Problem: METABOLIC, FLUID AND ELECTROLYTES - ADULT  Goal: Electrolytes maintained within normal limits  Description: INTERVENTIONS:  - Monitor labs and assess patient for signs and symptoms of electrolyte imbalances  - Administer electrolyte replacement as ordered  - Monitor response to electrolyte replacements, including repeat lab results as appropriate  - Instruct patient on fluid and nutrition as appropriate  Outcome: Completed  Goal: Fluid balance maintained  Description: INTERVENTIONS:  - Monitor labs   - Monitor I/O and WT  - Instruct patient on fluid and nutrition as appropriate  - Assess for signs & symptoms of volume excess or deficit  Outcome: Completed  Goal: Glucose maintained within target range  Description: INTERVENTIONS:  - Monitor Blood Glucose as ordered  - Assess for signs and symptoms of hyperglycemia and hypoglycemia  - Administer ordered medications to maintain glucose within target range  - Assess nutritional intake and initiate nutrition service referral as needed  Outcome: Completed     Problem: HEMATOLOGIC - ADULT  Goal: Maintains hematologic stability  Description: INTERVENTIONS  - Assess for signs and symptoms of bleeding or hemorrhage  - Monitor labs  - Administer supportive blood products/factors as ordered and appropriate  Outcome: Completed

## 2023-12-10 VITALS
HEIGHT: 73 IN | BODY MASS INDEX: 25.47 KG/M2 | SYSTOLIC BLOOD PRESSURE: 132 MMHG | DIASTOLIC BLOOD PRESSURE: 83 MMHG | OXYGEN SATURATION: 96 % | HEART RATE: 63 BPM | TEMPERATURE: 97.5 F | WEIGHT: 192.2 LBS | RESPIRATION RATE: 18 BRPM

## 2023-12-10 LAB
ALBUMIN SERPL BCP-MCNC: 4 G/DL (ref 3.5–5)
ALP SERPL-CCNC: 69 U/L (ref 34–104)
ALT SERPL W P-5'-P-CCNC: 18 U/L (ref 7–52)
ANION GAP SERPL CALCULATED.3IONS-SCNC: 9 MMOL/L
AST SERPL W P-5'-P-CCNC: 7 U/L (ref 13–39)
BASOPHILS # BLD AUTO: 0.03 THOUSANDS/ÂΜL (ref 0–0.1)
BASOPHILS NFR BLD AUTO: 0 % (ref 0–1)
BILIRUB SERPL-MCNC: 0.44 MG/DL (ref 0.2–1)
BUN SERPL-MCNC: 15 MG/DL (ref 5–25)
CALCIUM SERPL-MCNC: 9.5 MG/DL (ref 8.4–10.2)
CHLORIDE SERPL-SCNC: 100 MMOL/L (ref 96–108)
CO2 SERPL-SCNC: 28 MMOL/L (ref 21–32)
CREAT SERPL-MCNC: 0.86 MG/DL (ref 0.6–1.3)
EOSINOPHIL # BLD AUTO: 0.01 THOUSAND/ÂΜL (ref 0–0.61)
EOSINOPHIL NFR BLD AUTO: 0 % (ref 0–6)
ERYTHROCYTE [DISTWIDTH] IN BLOOD BY AUTOMATED COUNT: 15.2 % (ref 11.6–15.1)
GFR SERPL CREATININE-BSD FRML MDRD: 102 ML/MIN/1.73SQ M
GLUCOSE SERPL-MCNC: 130 MG/DL (ref 65–140)
HCT VFR BLD AUTO: 45.6 % (ref 36.5–49.3)
HGB BLD-MCNC: 14.9 G/DL (ref 12–17)
IMM GRANULOCYTES # BLD AUTO: 0.16 THOUSAND/UL (ref 0–0.2)
IMM GRANULOCYTES NFR BLD AUTO: 2 % (ref 0–2)
LYMPHOCYTES # BLD AUTO: 0.49 THOUSANDS/ÂΜL (ref 0.6–4.47)
LYMPHOCYTES NFR BLD AUTO: 5 % (ref 14–44)
MCH RBC QN AUTO: 27 PG (ref 26.8–34.3)
MCHC RBC AUTO-ENTMCNC: 32.7 G/DL (ref 31.4–37.4)
MCV RBC AUTO: 83 FL (ref 82–98)
MONOCYTES # BLD AUTO: 0.53 THOUSAND/ÂΜL (ref 0.17–1.22)
MONOCYTES NFR BLD AUTO: 6 % (ref 4–12)
NEUTROPHILS # BLD AUTO: 8.43 THOUSANDS/ÂΜL (ref 1.85–7.62)
NEUTS SEG NFR BLD AUTO: 87 % (ref 43–75)
NRBC BLD AUTO-RTO: 0 /100 WBCS
PLATELET # BLD AUTO: 332 THOUSANDS/UL (ref 149–390)
PMV BLD AUTO: 9.2 FL (ref 8.9–12.7)
POTASSIUM SERPL-SCNC: 4.1 MMOL/L (ref 3.5–5.3)
PROT SERPL-MCNC: 6.3 G/DL (ref 6.4–8.4)
RBC # BLD AUTO: 5.52 MILLION/UL (ref 3.88–5.62)
SODIUM SERPL-SCNC: 137 MMOL/L (ref 135–147)
WBC # BLD AUTO: 9.65 THOUSAND/UL (ref 4.31–10.16)

## 2023-12-10 PROCEDURE — 99239 HOSP IP/OBS DSCHRG MGMT >30: CPT | Performed by: FAMILY MEDICINE

## 2023-12-10 PROCEDURE — 80053 COMPREHEN METABOLIC PANEL: CPT | Performed by: FAMILY MEDICINE

## 2023-12-10 PROCEDURE — C9113 INJ PANTOPRAZOLE SODIUM, VIA: HCPCS | Performed by: PHYSICIAN ASSISTANT

## 2023-12-10 PROCEDURE — 85025 COMPLETE CBC W/AUTO DIFF WBC: CPT | Performed by: FAMILY MEDICINE

## 2023-12-10 RX ORDER — UPADACITINIB 45 MG/1
45 TABLET, EXTENDED RELEASE ORAL DAILY
Refills: 0
Start: 2023-12-10

## 2023-12-10 RX ORDER — ENOXAPARIN SODIUM 100 MG/ML
40 INJECTION SUBCUTANEOUS DAILY
Qty: 24 ML | Refills: 0 | Status: SHIPPED | OUTPATIENT
Start: 2023-12-11 | End: 2023-12-10

## 2023-12-10 RX ORDER — PREDNISONE 10 MG/1
TABLET ORAL
Qty: 140 TABLET | Refills: 0 | Status: SHIPPED | OUTPATIENT
Start: 2023-12-10 | End: 2024-02-04

## 2023-12-10 RX ADMIN — AMLODIPINE BESYLATE 2.5 MG: 2.5 TABLET ORAL at 09:46

## 2023-12-10 RX ADMIN — ENOXAPARIN SODIUM 40 MG: 40 INJECTION SUBCUTANEOUS at 09:44

## 2023-12-10 RX ADMIN — PANTOPRAZOLE SODIUM 40 MG: 40 INJECTION, POWDER, LYOPHILIZED, FOR SOLUTION INTRAVENOUS at 09:44

## 2023-12-10 RX ADMIN — PREDNISONE 40 MG: 20 TABLET ORAL at 13:38

## 2023-12-10 NOTE — ASSESSMENT & PLAN NOTE
Hx of SBO secondary to Crohn' disesase  PMH of Crohn's disease, complicated by fibrostenotic region, s/p R hemicolectomy w anastomosis 2012,     Presented to the ER for worsening abdominal pain, nausea, vomiting since last night after eating. ER course:  IVF, pain ctrl, NPO, NG tube    CT scan 11/05/2023: Distended loops of small bowel compatible with small bowel obstruction. Prior partial colectomy. Small ascites in the abdomen and mesentery. Per surgery - pt can be discharged today if he is tolerating his diet    XR abdomen obstruction series [[id number]]    12/08/23 0749  Significantly improved small bowel distention. Contrast noted in colon.     SBO resolved, had normal bowel movement yesterday, current diet is surgical soft  Continue treatment for Crohn's exacerbation, Solu-Medrol changed to prednisone yesterday  Appreciate general surgery input

## 2023-12-10 NOTE — DISCHARGE SUMMARY
[address]  Discharge- Porter Juarez 1975, 50 y.o. male MRN: [id number]  Unit/Bed#: 413-01 Encounter: [id number]  Primary Care Provider: Starla Merritt PA-C   Date and time admitted to hospital: 12/5/2023  5:57 AM    * SBO (small bowel obstruction) ([address])  Assessment & Plan  Hx of SBO secondary to Crohn' disesase  PMH of Crohn's disease, complicated by fibrostenotic region, s/p R hemicolectomy w anastomosis 2012,     Presented to the ER for worsening abdominal pain, nausea, vomiting since last night after eating. ER course:  IVF, pain ctrl, NPO, NG tube    CT scan 11/05/2023: Distended loops of small bowel compatible with small bowel obstruction. Prior partial colectomy. Small ascites in the abdomen and mesentery. Per surgery - pt can be discharged today if he is tolerating his diet    XR abdomen obstruction series [[id number]]    12/08/23 0749  Significantly improved small bowel distention. Contrast noted in colon. SBO resolved, had normal bowel movement yesterday, current diet is surgical soft  Continue treatment for Crohn's exacerbation, Solu-Medrol changed to prednisone yesterday  Appreciate general surgery input    Crohn's colitis, other complication [facility])  Assessment & Plan  PMH of Crohn's disease, complicated by fibrostenotic region, s/p R hemicolectomy w anastomosis 2012,   Recent admission for Crohn's flare at [facility]:   - CT scan 11/22/2023: possible SBO, enteritis, colitis. - Colonoscopy 11/24/2023: ulcer in colon, regions of severe colitis. - Was discharged on prednisone taper, Rinvoq 45 mg daily, states he is compliant. During this admission:  - Was on Solumedrol 20 mg TID since 12/06/2023 and transitioned to prednisone 40 mg, daily on 12/09/2023.   - Currently on prednisone 40 mg daily since yesterday.   General surgery and GI consulted, input is highly appreciated    Discharge today  Prednisone taper on discharge as follows:   40 mg, daily, for two weeks, then   30 mg daily for two weeks, then   20 mg daily for two weeks, then   10 daily for weeks and   stop. Follow up with outpatient GI ASAP after discharge to decide on Rinvoq, monitor Chron's and adjust meds accordingly if needed. Essential (primary) hypertension  Assessment & Plan  BP today 138/78  Continue  amlodipine 2.5 mg    Right kidney mass  Assessment & Plan  CT abdomen and pelvis 12/05/2023: Heterogeneously enhancing mass within the right kidney most concerning for renal cell carcinoma. Urology consult appreciated  O/p f/u w urology in the office within 1-2 week(s) after d/c      Discharging Physician / Practitioner: Maryanne Jeffries MD  PCP: Joanna Baker PA-C  Admission Date:   Admission Orders (From admission, onward)       Ordered        12/05/23 0934  INPATIENT ADMISSION  Once                          Discharge Date: 12/10/23    Medical Problems       Resolved Problems  Date Reviewed: 12/9/2023   None         Consultations During Hospital Stay:  Surgery  Gastroenterology  Urology    Procedures Performed:   XR abdomen obstruction series   Final Result      Significantly improved small bowel distention. NG tube tip is at GE junction. Consider advancement. Workstation performed: [id number]         XR abdomen 1 view kub   Final Result      Gastrografin challenge performed. After 8 hours of contrast administration, contrast has reached the colon and rectum. Persistent small bowel distention. Findings compatible with partial mechanical small bowel obstruction. Workstation performed: [id number]         XR chest 1 view portable   Final Result      No acute cardiopulmonary disease. NG tube terminating in the stomach.                   Workstation performed: [id number]         CT abdomen pelvis with contrast   Final Result      Distended loops of small bowel compatible with small bowel obstruction with a transition point likely within the central abdomen just to the right of midline. Suspected prior partial colectomy. Small ascites in the abdomen and mesentery. Heterogeneously enhancing mass within the right kidney most concerning for renal cell carcinoma. The study was marked in [facility] for immediate notification. Workstation performed: [id number]         XR chest 1 view portable   Final Result      No acute cardiopulmonary disease.                   Workstation performed: [id number]             Significant Findings / Test Results:   Results for orders placed or performed during the hospital encounter of 12/05/23   CBC and differential   Result Value Ref Range    WBC 15.34 (H) 4.31 - 10.16 Thousand/uL    RBC 5.82 (H) 3.88 - 5.62 Million/uL    Hemoglobin 15.6 12.0 - 17.0 g/dL    Hematocrit 47.9 36.5 - 49.3 %    MCV 82 82 - 98 fL    MCH 26.8 26.8 - 34.3 pg    MCHC 32.6 31.4 - 37.4 g/dL    RDW 15.9 (H) 11.6 - 15.1 %    MPV 8.9 8.9 - 12.7 fL    Platelets 601 230 - 938 Thousands/uL    nRBC 0 /100 WBCs    Neutrophils Relative 83 (H) 43 - 75 %    Immat GRANS % 2 0 - 2 %    Lymphocytes Relative 8 (L) 14 - 44 %    Monocytes Relative 6 4 - 12 %    Eosinophils Relative 0 0 - 6 %    Basophils Relative 1 0 - 1 %    Neutrophils Absolute 12.72 (H) 1.85 - 7.62 Thousands/µL    Immature Grans Absolute 0.37 (H) 0.00 - 0.20 Thousand/uL    Lymphocytes Absolute 1.29 0.60 - 4.47 Thousands/µL    Monocytes Absolute 0.84 0.17 - 1.22 Thousand/µL    Eosinophils Absolute 0.03 0.00 - 0.61 Thousand/µL    Basophils Absolute 0.09 0.00 - 0.10 Thousands/µL   Comprehensive metabolic panel   Result Value Ref Range    Sodium 138 135 - 147 mmol/L    Potassium 3.4 (L) 3.5 - 5.3 mmol/L    Chloride 99 96 - 108 mmol/L    CO2 28 21 - 32 mmol/L    ANION GAP 11 mmol/L    BUN 19 5 - 25 mg/dL    Creatinine 1.01 0.60 - 1.30 mg/dL    Glucose 102 65 - 140 mg/dL    Calcium 9.8 8.4 - 10.2 mg/dL    AST 12 (L) 13 - 39 U/L    ALT 28 7 - 52 U/L    Alkaline Phosphatase 74 34 - 104 U/L    Total Protein 6.4 6.4 - 8.4 g/dL    Albumin 4.5 3.5 - 5.0 g/dL    Total Bilirubin 0.82 0.20 - 1.00 mg/dL    eGFR 87 ml/min/1.73sq m   Lipase   Result Value Ref Range    Lipase 13 11 - 82 u/L   Sedimentation rate, automated   Result Value Ref Range    Sed Rate <1 0 - 14 mm/hour   C-reactive protein   Result Value Ref Range    CRP 2.4 <3.0 mg/L   UA w Reflex to Microscopic w Reflex to Culture    Specimen: Urine, Clean Catch   Result Value Ref Range    Color, UA Yellow     Clarity, UA Clear     Specific Gravity, UA 1.020 1.003 - 1.030    pH, UA 7.0 4.5, 5.0, 5.5, 6.0, 6.5, 7.0, 7.5, 8.0    Leukocytes, UA Negative Negative    Nitrite, UA Negative Negative    Protein, UA Negative Negative mg/dl    Glucose, UA Negative Negative mg/dl    Ketones, UA Negative Negative mg/dl    Urobilinogen, UA 1.0 0.2, 1.0 E.U./dl E.U./dl    Bilirubin, UA Negative Negative    Occult Blood, UA Negative Negative   Lactic acid, plasma (w/reflex if result > 2.0)   Result Value Ref Range    LACTIC ACID 2.3 (HH) 0.5 - 2.0 mmol/L   Lactic acid 2 Hours   Result Value Ref Range    LACTIC ACID 1.6 0.5 - 2.0 mmol/L   Comprehensive metabolic panel   Result Value Ref Range    Sodium 135 135 - 147 mmol/L    Potassium 4.0 3.5 - 5.3 mmol/L    Chloride 99 96 - 108 mmol/L    CO2 28 21 - 32 mmol/L    ANION GAP 8 mmol/L    BUN 15 5 - 25 mg/dL    Creatinine 0.90 0.60 - 1.30 mg/dL    Glucose 101 65 - 140 mg/dL    Calcium 8.6 8.4 - 10.2 mg/dL    AST 10 (L) 13 - 39 U/L    ALT 20 7 - 52 U/L    Alkaline Phosphatase 73 34 - 104 U/L    Total Protein 5.2 (L) 6.4 - 8.4 g/dL    Albumin 3.6 3.5 - 5.0 g/dL    Total Bilirubin 0.95 0.20 - 1.00 mg/dL    eGFR 100 ml/min/1.73sq m   Magnesium   Result Value Ref Range    Magnesium 2.5 1.9 - 2.7 mg/dL   CBC and differential   Result Value Ref Range    WBC 12.66 (H) 4.31 - 10.16 Thousand/uL    RBC 5.37 3.88 - 5.62 Million/uL    Hemoglobin 14.4 12.0 - 17.0 g/dL    Hematocrit 44.5 36.5 - 49.3 %    MCV 83 82 - 98 fL    MCH 26.8 26.8 - 34.3 pg    MCHC 32.4 31.4 - 37.4 g/dL    RDW 15.9 (H) 11.6 - 15.1 %    MPV 9.3 8.9 - 12.7 fL    Platelets 783 338 - 914 Thousands/uL    nRBC 0 /100 WBCs    Neutrophils Relative 85 (H) 43 - 75 %    Immat GRANS % 1 0 - 2 %    Lymphocytes Relative 6 (L) 14 - 44 %    Monocytes Relative 6 4 - 12 %    Eosinophils Relative 1 0 - 6 %    Basophils Relative 1 0 - 1 %    Neutrophils Absolute 10.85 (H) 1.85 - 7.62 Thousands/µL    Immature Grans Absolute 0.16 0.00 - 0.20 Thousand/uL    Lymphocytes Absolute 0.81 0.60 - 4.47 Thousands/µL    Monocytes Absolute 0.70 0.17 - 1.22 Thousand/µL    Eosinophils Absolute 0.08 0.00 - 0.61 Thousand/µL    Basophils Absolute 0.06 0.00 - 0.10 Thousands/µL   Procalcitonin   Result Value Ref Range    Procalcitonin <0.05 <=0.25 ng/ml   CBC and differential   Result Value Ref Range    WBC 11.07 (H) 4.31 - 10.16 Thousand/uL    RBC 5.40 3.88 - 5.62 Million/uL    Hemoglobin 14.8 12.0 - 17.0 g/dL    Hematocrit 45.1 36.5 - 49.3 %    MCV 84 82 - 98 fL    MCH 27.4 26.8 - 34.3 pg    MCHC 32.8 31.4 - 37.4 g/dL    RDW 15.5 (H) 11.6 - 15.1 %    MPV 9.0 8.9 - 12.7 fL    Platelets 348 467 - 940 Thousands/uL    nRBC 0 /100 WBCs    Neutrophils Relative 93 (H) 43 - 75 %    Immat GRANS % 1 0 - 2 %    Lymphocytes Relative 2 (L) 14 - 44 %    Monocytes Relative 4 4 - 12 %    Eosinophils Relative 0 0 - 6 %    Basophils Relative 0 0 - 1 %    Neutrophils Absolute 10.25 (H) 1.85 - 7.62 Thousands/µL    Immature Grans Absolute 0.14 0.00 - 0.20 Thousand/uL    Lymphocytes Absolute 0.26 (L) 0.60 - 4.47 Thousands/µL    Monocytes Absolute 0.39 0.17 - 1.22 Thousand/µL    Eosinophils Absolute 0.00 0.00 - 0.61 Thousand/µL    Basophils Absolute 0.03 0.00 - 0.10 Thousands/µL   Comprehensive metabolic panel   Result Value Ref Range    Sodium 134 (L) 135 - 147 mmol/L    Potassium 4.6 3.5 - 5.3 mmol/L    Chloride 98 96 - 108 mmol/L    CO2 26 21 - 32 mmol/L    ANION GAP 10 mmol/L    BUN 13 5 - 25 mg/dL    Creatinine 0.93 0.60 - 1.30 mg/dL    Glucose 125 65 - 140 mg/dL    Calcium 8.9 8.4 - 10.2 mg/dL    AST 11 (L) 13 - 39 U/L    ALT 21 7 - 52 U/L    Alkaline Phosphatase 67 34 - 104 U/L    Total Protein 6.0 (L) 6.4 - 8.4 g/dL    Albumin 3.8 3.5 - 5.0 g/dL    Total Bilirubin 1.19 (H) 0.20 - 1.00 mg/dL    eGFR 96 ml/min/1.73sq m   CBC and differential   Result Value Ref Range    WBC 13.01 (H) 4.31 - 10.16 Thousand/uL    RBC 4.77 3.88 - 5.62 Million/uL    Hemoglobin 13.0 12.0 - 17.0 g/dL    Hematocrit 39.9 36.5 - 49.3 %    MCV 84 82 - 98 fL    MCH 27.3 26.8 - 34.3 pg    MCHC 32.6 31.4 - 37.4 g/dL    RDW 15.4 (H) 11.6 - 15.1 %    MPV 9.0 8.9 - 12.7 fL    Platelets 197 600 - 052 Thousands/uL   Comprehensive metabolic panel   Result Value Ref Range    Sodium 137 135 - 147 mmol/L    Potassium 4.1 3.5 - 5.3 mmol/L    Chloride 103 96 - 108 mmol/L    CO2 28 21 - 32 mmol/L    ANION GAP 6 mmol/L    BUN 17 5 - 25 mg/dL    Creatinine 0.85 0.60 - 1.30 mg/dL    Glucose 121 65 - 140 mg/dL    Calcium 8.6 8.4 - 10.2 mg/dL    AST 9 (L) 13 - 39 U/L    ALT 19 7 - 52 U/L    Alkaline Phosphatase 62 34 - 104 U/L    Total Protein 5.6 (L) 6.4 - 8.4 g/dL    Albumin 3.6 3.5 - 5.0 g/dL    Total Bilirubin 0.58 0.20 - 1.00 mg/dL    eGFR 103 ml/min/1.73sq m   CBC and differential   Result Value Ref Range    WBC 6.80 4.31 - 10.16 Thousand/uL    RBC 4.80 3.88 - 5.62 Million/uL    Hemoglobin 13.1 12.0 - 17.0 g/dL    Hematocrit 39.4 36.5 - 49.3 %    MCV 82 82 - 98 fL    MCH 27.3 26.8 - 34.3 pg    MCHC 33.2 31.4 - 37.4 g/dL    RDW 15.3 (H) 11.6 - 15.1 %    MPV 9.0 8.9 - 12.7 fL    Platelets 660 865 - 612 Thousands/uL    nRBC 0 /100 WBCs    Neutrophils Relative 79 (H) 43 - 75 %    Immat GRANS % 2 0 - 2 %    Lymphocytes Relative 10 (L) 14 - 44 %    Monocytes Relative 8 4 - 12 %    Eosinophils Relative 1 0 - 6 %    Basophils Relative 0 0 - 1 %    Neutrophils Absolute 5.33 1.85 - 7.62 Thousands/µL    Immature Grans Absolute 0.12 0.00 - 0.20 Thousand/uL    Lymphocytes Absolute 0.71 0.60 - 4.47 Thousands/µL Monocytes Absolute 0.57 0.17 - 1.22 Thousand/µL    Eosinophils Absolute 0.04 0.00 - 0.61 Thousand/µL    Basophils Absolute 0.03 0.00 - 0.10 Thousands/µL   Basic metabolic panel   Result Value Ref Range    Sodium 136 135 - 147 mmol/L    Potassium 4.0 3.5 - 5.3 mmol/L    Chloride 103 96 - 108 mmol/L    CO2 26 21 - 32 mmol/L    ANION GAP 7 mmol/L    BUN 13 5 - 25 mg/dL    Creatinine 0.81 0.60 - 1.30 mg/dL    Glucose 98 65 - 140 mg/dL    Calcium 8.8 8.4 - 10.2 mg/dL    eGFR 105 ml/min/1.73sq m   CBC and differential   Result Value Ref Range    WBC 9.65 4.31 - 10.16 Thousand/uL    RBC 5.52 3.88 - 5.62 Million/uL    Hemoglobin 14.9 12.0 - 17.0 g/dL    Hematocrit 45.6 36.5 - 49.3 %    MCV 83 82 - 98 fL    MCH 27.0 26.8 - 34.3 pg    MCHC 32.7 31.4 - 37.4 g/dL    RDW 15.2 (H) 11.6 - 15.1 %    MPV 9.2 8.9 - 12.7 fL    Platelets 948 335 - 932 Thousands/uL    nRBC 0 /100 WBCs    Neutrophils Relative 87 (H) 43 - 75 %    Immat GRANS % 2 0 - 2 %    Lymphocytes Relative 5 (L) 14 - 44 %    Monocytes Relative 6 4 - 12 %    Eosinophils Relative 0 0 - 6 %    Basophils Relative 0 0 - 1 %    Neutrophils Absolute 8.43 (H) 1.85 - 7.62 Thousands/µL    Immature Grans Absolute 0.16 0.00 - 0.20 Thousand/uL    Lymphocytes Absolute 0.49 (L) 0.60 - 4.47 Thousands/µL    Monocytes Absolute 0.53 0.17 - 1.22 Thousand/µL    Eosinophils Absolute 0.01 0.00 - 0.61 Thousand/µL    Basophils Absolute 0.03 0.00 - 0.10 Thousands/µL   Comprehensive metabolic panel   Result Value Ref Range    Sodium 137 135 - 147 mmol/L    Potassium 4.1 3.5 - 5.3 mmol/L    Chloride 100 96 - 108 mmol/L    CO2 28 21 - 32 mmol/L    ANION GAP 9 mmol/L    BUN 15 5 - 25 mg/dL    Creatinine 0.86 0.60 - 1.30 mg/dL    Glucose 130 65 - 140 mg/dL    Calcium 9.5 8.4 - 10.2 mg/dL    AST 7 (L) 13 - 39 U/L    ALT 18 7 - 52 U/L    Alkaline Phosphatase 69 34 - 104 U/L    Total Protein 6.3 (L) 6.4 - 8.4 g/dL    Albumin 4.0 3.5 - 5.0 g/dL    Total Bilirubin 0.44 0.20 - 1.00 mg/dL    eGFR 102 ml/min/1.73sq m   Manual Differential(PHLEBS Do Not Order)   Result Value Ref Range    Segmented % 86 (H) 43 - 75 %    Lymphocytes % 8 (L) 14 - 44 %    Monocytes % 6 4 - 12 %    Eosinophils, % 0 0 - 6 %    Basophils % 0 0 - 1 %    Absolute Neutrophils 11.19 (H) 1.85 - 7.62 Thousand/uL    Lymphocytes Absolute 1.04 0.60 - 4.47 Thousand/uL    Monocytes Absolute 0.78 0.00 - 1.22 Thousand/uL    Eosinophils Absolute 0.00 0.00 - 0.40 Thousand/uL    Basophils Absolute 0.00 0.00 - 0.10 Thousand/uL    Total Counted      RBC Morphology Normal     Platelet Estimate Adequate Adequate       Incidental Findings:   None     Test Results Pending at Discharge (will require follow up): None     Outpatient Tests Requested:  None    Complications:  None    Reason for Admission: Small Bowel Obstruction    Hospital Course:     Patient 49-year-old male with past medical history of Crohn's disease, came to the hospital on 12/5/2023 with the worsening abdominal pain, nausea, vomiting. During his hospital stay he was treated for small bowel obstruction, Crohn's colitis with IV fluids, IV steroids, was seen by General surgery, and GI specialists. His condition improved, small bowel obstruction resolved, he was transitioned from IV to p.o. steroids -prednisone tablet 40 mg daily, with lowering by 10 mg biweekly tapering schedule, and an recommendation to follow-up regarding Crohn's disease with GI specialist in outpatient setting as soon as possible. Please see above list of diagnoses and related plan for additional information. Condition at Discharge: stable     Discharge Day Visit / Exam:     Subjective:  Patient's condition improved, small bowel obstruction resolved, he is passing flatus and having regular bowel movements, no complaints on abdominal pain, tenderness,  constipation, or blood in stool. Patient is tolerating his current diet well.       Vitals: Blood Pressure: 132/83 (12/10/23 0945)  Pulse: 63 (12/10/23 0945)  Temperature: 97.5 °F (36.4 °C) (12/10/23 0609)  Temp Source: Oral (12/09/23 1451)  Respirations: 18 (12/10/23 0609)  Height: 6' 1" (185.4 cm) (12/05/23 1629)  Weight - Scale: 87.2 kg (192 lb 3.2 oz) (12/05/23 1629)  SpO2: 96 % (12/10/23 0945)    Exam:   Physical Exam  Vitals and nursing note reviewed. Constitutional:       General: He is not in acute distress. Appearance: Normal appearance. He is well-developed. HENT:      Head: Normocephalic and atraumatic. Nose: Nose normal.      Mouth/Throat:      Mouth: Mucous membranes are moist.      Pharynx: Oropharynx is clear. Eyes:      Conjunctiva/sclera: Conjunctivae normal.   Cardiovascular:      Rate and Rhythm: Normal rate and regular rhythm. Pulses: Normal pulses. Heart sounds: Normal heart sounds. No murmur heard. Pulmonary:      Effort: Pulmonary effort is normal. No respiratory distress. Breath sounds: Normal breath sounds. Abdominal:      General: Bowel sounds are normal.      Palpations: Abdomen is soft. There is no hepatomegaly or pulsatile mass. Tenderness: There is no abdominal tenderness. There is no guarding or rebound. Negative signs include Wall's sign. Musculoskeletal:         General: No swelling. Normal range of motion. Cervical back: Normal range of motion and neck supple. Skin:     General: Skin is warm and dry. Capillary Refill: Capillary refill takes less than 2 seconds. Neurological:      Mental Status: He is alert and oriented to person, place, and time. Mental status is at baseline. Psychiatric:         Mood and Affect: Mood normal.         Behavior: Behavior normal.         Discussion with Family: Yes    Discharge instructions/Information to patient and family:   See after visit summary for information provided to patient and family. Provisions for Follow-Up Care:  See after visit summary for information related to follow-up care and any pertinent home health orders. Disposition:     Home    For Discharges to [facility]:   Not Applicable to this Patient - Not Applicable to this Patient    Planned Readmission: No     Discharge Statement:  I spent 60 minutes discharging the patient. This time was spent on the day of discharge. I had direct contact with the patient on the day of discharge. Greater than 50% of the total time was spent examining patient, answering all patient questions, arranging and discussing plan of care with patient as well as directly providing post-discharge instructions. Additional time then spent on discharge activities. Discharge Medications:  See after visit summary for reconciled discharge medications provided to patient and family.       ** Please Note: This note has been constructed using a voice recognition system **

## 2023-12-10 NOTE — PROGRESS NOTES
[address]  Progress Note  Name: Bandar Hopkins  MRN: [id number]  Unit/Bed#: 827-61 I Date of Admission: 12/5/2023   Date of Service: 12/10/2023 I Hospital Day: 5    Assessment/Plan   * SBO (small bowel obstruction) [facility])  Assessment & Plan  Assessment:  Mechanical small bowel obstruction, resolving. Plan:  Doing well with his diet, appears clinically hemodynamically hematologically stable for discharge home and outpatient GI and general surgery follow-up. I advised a low residue diet/less than 5 g daily of fiber until symptoms fully resolved. Subjective/Objective   Chief Complaint: none    Subjective: Doing well today with breakfast and lunch, no nausea vomiting, has had return of bowel function, pain continues to improve, still comes and goes intermittently. Objective:     Blood pressure 132/83, pulse 63, temperature 97.5 °F (36.4 °C), resp. rate 18, height 6' 1" (1.854 m), weight 87.2 kg (192 lb 3.2 oz), SpO2 96 %. ,Body mass index is 25.36 kg/m². Intake/Output Summary (Last 24 hours) at 12/10/2023 1451  Last data filed at 12/10/2023 1214  Gross per 24 hour   Intake 990 ml   Output --   Net 990 ml       Invasive Devices       None                   Physical Exam  Vitals and nursing note reviewed. Constitutional:       General: He is not in acute distress. Appearance: He is well-developed. He is not diaphoretic. HENT:      Head: Normocephalic and atraumatic. Eyes:      Conjunctiva/sclera: Conjunctivae normal.      Pupils: Pupils are equal, round, and reactive to light. Pulmonary:      Effort: No respiratory distress. Abdominal:      Comments: Soft mild fullness nontender. Musculoskeletal:         General: Normal range of motion. Cervical back: Normal range of motion. Skin:     General: Skin is warm and dry. Capillary Refill: Capillary refill takes less than 2 seconds.    Neurological:      Mental Status: He is alert and oriented to person, place, and time. Psychiatric:         Behavior: Behavior normal.           Lab, Imaging and other studies:I have personally reviewed pertinent lab results.   , CBC:   Lab Results   Component Value Date    WBC 9.65 12/10/2023    HGB 14.9 12/10/2023    HCT 45.6 12/10/2023    MCV 83 12/10/2023     12/10/2023    RBC 5.52 12/10/2023    MCH 27.0 12/10/2023    MCHC 32.7 12/10/2023    RDW 15.2 (H) 12/10/2023    MPV 9.2 12/10/2023    NRBC 0 12/10/2023   , CMP:   Lab Results   Component Value Date    SODIUM 137 12/10/2023    K 4.1 12/10/2023     12/10/2023    CO2 28 12/10/2023    BUN 15 12/10/2023    CREATININE 0.86 12/10/2023    CALCIUM 9.5 12/10/2023    AST 7 (L) 12/10/2023    ALT 18 12/10/2023    ALKPHOS 69 12/10/2023    EGFR 102 12/10/2023     VTE Pharmacologic Prophylaxis: Enoxaparin (Lovenox)  VTE Mechanical Prophylaxis: sequential compression device

## 2023-12-10 NOTE — ASSESSMENT & PLAN NOTE
Assessment:  Mechanical small bowel obstruction, resolving. Plan:  Doing well with his diet, appears clinically hemodynamically hematologically stable for discharge home and outpatient GI and general surgery follow-up. I advised a low residue diet/less than 5 g daily of fiber until symptoms fully resolved.

## 2023-12-10 NOTE — PLAN OF CARE
Problem: PAIN - ADULT  Goal: Verbalizes/displays adequate comfort level or baseline comfort level  Description: Interventions:  - Encourage patient to monitor pain and request assistance  - Assess pain using appropriate pain scale (0-10 pain scale)  - Administer analgesics based on type and severity of pain and evaluate response  - Implement non-pharmacological measures as appropriate and evaluate response  - Consider cultural and social influences on pain and pain management  - Notify physician/advanced practitioner if interventions unsuccessful or patient reports new pain  Outcome: Progressing     Problem: INFECTION - ADULT  Goal: Absence or prevention of progression during hospitalization  Description: INTERVENTIONS:  - Assess and monitor for signs and symptoms of infection  - Monitor lab/diagnostic results  - Monitor all insertion sites, i.e. indwelling lines  - Administer medications as ordered  - Instruct and encourage patient and family to use good hand hygiene technique  Outcome: Progressing     Problem: SAFETY ADULT  Goal: Patient will remain free of falls  Description: INTERVENTIONS:  - Educate patient/family on patient safety including physical limitations  - Instruct patient to call for assistance with activity (independent)  - Consult OT/PT to assist with strengthening/mobility   - Keep Call bell within reach  - Keep bed low and locked with side rails adjusted as appropriate  - Keep care items and personal belongings within reach  - Initiate and maintain comfort rounds  - Make Fall Risk Sign visible to staff (low fall risk)  - Offer Toileting every 1-2 Hours, in advance of need  - Obtain necessary fall risk management equipment: nonskid footwear  Outcome: Progressing  Goal: Maintain or return to baseline ADL function  Description: INTERVENTIONS:  -  Assess patient's ability to carry out ADLs; (independent)  - Assess/evaluate cause of self-care deficits   - Assess range of motion  - Assess patient's mobility; (independent)  - Assess patient's need for assistive devices and provide as appropriate  - Encourage maximum independence but intervene and supervise when necessary  - Assess for home care needs following discharge   - Consider OT consult to assist with ADL evaluation and planning for discharge  - Provide patient education as appropriate  Outcome: Progressing  Goal: Maintains/Returns to pre admission functional level  Description: INTERVENTIONS:  - Perform AM-PAC 6 Click Basic Mobility/ Daily Activity assessment daily.  - Set and communicate daily mobility goal to care team and patient/family/caregiver. - Collaborate with rehabilitation services on mobility goals if consulted  - Ambulate patient 3-6 times a day  - Out of bed to chair 3 times a day   - Out of bed for meals 3 times a day  - Out of bed for toileting  - Record patient progress and toleration of activity level   Outcome: Progressing     Problem: DISCHARGE PLANNING  Goal: Discharge to home or other facility with appropriate resources  Description: INTERVENTIONS:  - Identify barriers to discharge w/patient and caregiver  - Arrange for needed discharge resources and transportation as appropriate  - Identify discharge learning needs (meds, wound care, etc.)  - Refer to Case Management Department for coordinating discharge planning if the patient needs post-hospital services based on physician/advanced practitioner order or complex needs related to functional status, cognitive ability, or social support system  Outcome: Progressing     Problem: Knowledge Deficit  Goal: Patient/family/caregiver demonstrates understanding of disease process, treatment plan, medications, and discharge instructions  Description: Complete learning assessment and assess knowledge base.   Interventions:  - Provide teaching at level of understanding  - Provide teaching via preferred learning methods  Outcome: Progressing     Problem: GASTROINTESTINAL - ADULT  Goal: Minimal or absence of nausea and/or vomiting  Description: INTERVENTIONS:  - Administer IV fluids if ordered to ensure adequate hydration  - Maintain NPO status until nausea and vomiting are resolved  - Administer ordered antiemetic medications as needed  - Provide nonpharmacologic comfort measures as appropriate  - Advance diet as tolerated, if ordered  - Consider nutrition services referral to assist patient with adequate nutrition and appropriate food choices  Outcome: Progressing  Goal: Maintains or returns to baseline bowel function  Description: INTERVENTIONS:  - Assess bowel function  - Encourage oral fluids to ensure adequate hydration  - Administer IV fluids if ordered to ensure adequate hydration  - Administer ordered medications as needed  - Encourage mobilization and activity  - Consider nutritional services referral to assist patient with adequate nutrition and appropriate food choices  Outcome: Progressing  Goal: Maintains adequate nutritional intake  Description: INTERVENTIONS:  - Monitor percentage of each meal consumed  - Identify factors contributing to decreased intake, treat as appropriate  - Assist with meals as needed  - Monitor I&O, weight, and lab values if indicated  - Obtain nutrition services referral as needed  Outcome: Progressing  Goal: Oral mucous membranes remain intact  Description: INTERVENTIONS  - Assess oral mucosa and hygiene practices  - Implement preventative oral hygiene regimen  - Implement oral medicated treatments as ordered  - Initiate Nutrition services referral as needed  Outcome: Progressing

## 2023-12-10 NOTE — ASSESSMENT & PLAN NOTE
PMH of Crohn's disease, complicated by fibrostenotic region, s/p R hemicolectomy w anastomosis 2012,   Recent admission for Crohn's flare at [facility]:   - CT scan 11/22/2023: possible SBO, enteritis, colitis. - Colonoscopy 11/24/2023: ulcer in colon, regions of severe colitis. - Was discharged on prednisone taper, Rinvoq 45 mg daily, states he is compliant. During this admission:  - Was on Solumedrol 20 mg TID since 12/06/2023 and transitioned to prednisone 40 mg, daily on 12/09/2023.   - Currently on prednisone 40 mg daily since yesterday. General surgery and GI consulted, input is highly appreciated    Discharge today  Prednisone taper on discharge as follows:   40 mg, daily, for two weeks, then   30 mg daily for two weeks, then   20 mg daily for two weeks, then   10 daily for weeks and   stop. Follow up with outpatient GI ASAP after discharge to decide on Rinvoq, monitor Chron's and adjust meds accordingly if needed.

## 2023-12-11 NOTE — CASE MANAGEMENT
Case Management Discharge Planning Note    Patient name Carolin Rangel  Location [address] [facility] [id number] MRN [id number]  : 1975 Date 12/10/2023       Current Admission Date: 2023  Current Admission Diagnosis:SBO (small bowel obstruction) [facility])   Patient Active Problem List    Diagnosis Date Noted    Right kidney mass 2023    SBO (small bowel obstruction) ([address]) 2023    Crohn's colitis, other complication ([address])     Essential (primary) hypertension 2023      LOS (days): 5  Geometric Mean LOS (GMLOS) (days):   Days to GMLOS:     OBJECTIVE:  Risk of Unplanned Readmission Score: 7.66         Current admission status: Inpatient   Preferred Pharmacy:   [address] [place], [place] [address]e [id number] [facility]  [facility] [address]  Phone: [phone] Fax: [phone]    Primary Care Provider: Светлана Hodgson PA-C    Primary Insurance: Brian Gabriel MA [facility]  Secondary Insurance:     DISCHARGE DETAILS:pt discharged home. No discharge needs noted. Nurse reviewed AVS, follow up providers listed.

## 2023-12-11 NOTE — UTILIZATION REVIEW
NOTIFICATION OF ADMISSION DISCHARGE   This is a Notification of Discharge from [address]. Please be advised that this patient has been discharge from our facility. Below you will find the admission and discharge date and time including the patient’s disposition. UTILIZATION REVIEW CONTACT:  Ronal Flowers  Utilization   Network Utilization Review Department  Phone: [phone] x carefully listen to the prompts. All voicemails are confidential.  Email: [email or web address]     ADMISSION INFORMATION  PRESENTATION DATE: 12/5/2023  5:57 AM  OBERVATION ADMISSION DATE:   INPATIENT ADMISSION DATE: 12/5/23  9:34 AM   DISCHARGE DATE: 12/10/2023  2:16 PM   DISPOSITION:Home/Self Care    Network Utilization Review Department  ATTENTION: Please call with any questions or concerns to [phone] and carefully listen to the prompts so that you are directed to the right person. All voicemails are confidential.   For Discharge needs, contact Care Management DC Support Team at [phone] opt. 2  Send all requests for admission clinical reviews, approved or denied determinations and any other requests to dedicated fax number below belonging to the campus where the patient is receiving treatment.  List of dedicated fax numbers for the Facilities:  [place] DENIALS (Administrative/Medical Necessity) [phone]   DISCHARGE SUPPORT TEAM (Network) [phone] [facility] ([facility]ediatric[facility]) [phone]   [address] [facility] [address] [place] [address] [phone]   [facility] [address] [facility] [phone]   [address] [place] [address]  [address]  [phone]

## 2023-12-11 NOTE — TELEPHONE ENCOUNTER
Called and left detailed VM for patient for appointment with Dr. Jovan Buchanan on 12/21 @ 10:30 am in the [facility] to discuss renal mass. Office address provided on VM. Asked patient to please call the office back to confirm this appointment.

## 2023-12-12 NOTE — TELEPHONE ENCOUNTER
2nd voicemail left for patient to please return call to confirm appointment with Dr. Jennifer Garza.

## 2023-12-14 NOTE — TELEPHONE ENCOUNTER
Attempted to contact patient for the third time, however had to leave another VM. Call placed to patients mother, Jesi Thompson, and spoke with her. She was provided with patients appointment date, time, and location and advised for patient to arrive 15 minutes early.

## 2025-06-26 ENCOUNTER — APPOINTMENT (OUTPATIENT)
Dept: RADIOLOGY | Facility: HOSPITAL | Age: 50
DRG: 245 | End: 2025-06-26
Payer: COMMERCIAL

## 2025-06-26 ENCOUNTER — APPOINTMENT (EMERGENCY)
Dept: CT IMAGING | Facility: HOSPITAL | Age: 50
DRG: 245 | End: 2025-06-26
Payer: COMMERCIAL

## 2025-06-26 ENCOUNTER — HOSPITAL ENCOUNTER (INPATIENT)
Facility: HOSPITAL | Age: 50
LOS: 1 days | Discharge: HOME | DRG: 245 | End: 2025-06-29
Attending: EMERGENCY MEDICINE | Admitting: FAMILY MEDICINE
Payer: COMMERCIAL

## 2025-06-26 ENCOUNTER — TELEPHONE (OUTPATIENT)
Dept: OTHER | Facility: HOSPITAL | Age: 50
End: 2025-06-26

## 2025-06-26 DIAGNOSIS — K62.5 RECTAL BLEEDING: ICD-10-CM

## 2025-06-26 DIAGNOSIS — R11.2 NAUSEA AND VOMITING: ICD-10-CM

## 2025-06-26 DIAGNOSIS — K50.118 CROHN'S COLITIS, OTHER COMPLICATION (HCC): ICD-10-CM

## 2025-06-26 DIAGNOSIS — K50.10 CROHN'S COLITIS, WITHOUT COMPLICATIONS (HCC): ICD-10-CM

## 2025-06-26 DIAGNOSIS — K44.9 HERNIA, HIATAL: ICD-10-CM

## 2025-06-26 DIAGNOSIS — C64.1 RENAL MALIGNANT NEOPLASM, RIGHT (HCC): ICD-10-CM

## 2025-06-26 DIAGNOSIS — I10 ESSENTIAL (PRIMARY) HYPERTENSION: ICD-10-CM

## 2025-06-26 DIAGNOSIS — R10.9 ABDOMINAL PAIN: Primary | ICD-10-CM

## 2025-06-26 PROBLEM — K56.609 SBO (SMALL BOWEL OBSTRUCTION) (HCC): Chronic | Status: ACTIVE | Noted: 2023-11-24

## 2025-06-26 PROBLEM — K56.609 SBO (SMALL BOWEL OBSTRUCTION) (HCC): Chronic | Status: RESOLVED | Noted: 2023-11-24 | Resolved: 2025-06-26

## 2025-06-26 PROBLEM — Z90.49 S/P RIGHT HEMICOLECTOMY: Status: ACTIVE | Noted: 2021-02-19

## 2025-06-26 PROBLEM — M85.80 OSTEOPENIA WITH HIGH RISK OF FRACTURE: Status: ACTIVE | Noted: 2023-03-13

## 2025-06-26 PROBLEM — K50.119 CROHN'S DISEASE OF COLON WITH COMPLICATION (HCC): Status: ACTIVE | Noted: 2023-12-05

## 2025-06-26 PROBLEM — N28.89 RIGHT KIDNEY MASS: Status: ACTIVE | Noted: 2019-07-03

## 2025-06-26 LAB
2HR DELTA HS TROPONIN: 1 NG/L
4HR DELTA HS TROPONIN: 1 NG/L
ALBUMIN SERPL BCG-MCNC: 4.4 G/DL (ref 3.5–5)
ALP SERPL-CCNC: 109 U/L (ref 34–104)
ALT SERPL W P-5'-P-CCNC: 31 U/L (ref 7–52)
ANION GAP SERPL CALCULATED.3IONS-SCNC: 8 MMOL/L (ref 4–13)
AST SERPL W P-5'-P-CCNC: 13 U/L (ref 13–39)
ATRIAL RATE: 80 BPM
BASOPHILS # BLD AUTO: 0.05 THOUSANDS/ÂΜL (ref 0–0.1)
BASOPHILS NFR BLD AUTO: 1 % (ref 0–1)
BILIRUB SERPL-MCNC: 0.3 MG/DL (ref 0.2–1)
BILIRUB UR QL STRIP: NEGATIVE
BNP SERPL-MCNC: 63 PG/ML (ref 0–100)
BUN SERPL-MCNC: 14 MG/DL (ref 5–25)
CALCIUM SERPL-MCNC: 9.4 MG/DL (ref 8.4–10.2)
CARDIAC TROPONIN I PNL SERPL HS: 5 NG/L (ref ?–50)
CARDIAC TROPONIN I PNL SERPL HS: 6 NG/L (ref ?–50)
CARDIAC TROPONIN I PNL SERPL HS: 6 NG/L (ref ?–50)
CHLORIDE SERPL-SCNC: 106 MMOL/L (ref 96–108)
CLARITY UR: CLEAR
CO2 SERPL-SCNC: 25 MMOL/L (ref 21–32)
COLOR UR: COLORLESS
CREAT SERPL-MCNC: 0.89 MG/DL (ref 0.6–1.3)
CRP SERPL QL: 3.6 MG/L
EOSINOPHIL # BLD AUTO: 0.04 THOUSAND/ÂΜL (ref 0–0.61)
EOSINOPHIL NFR BLD AUTO: 1 % (ref 0–6)
ERYTHROCYTE [DISTWIDTH] IN BLOOD BY AUTOMATED COUNT: 14.8 % (ref 11.6–15.1)
GFR SERPL CREATININE-BSD FRML MDRD: 99 ML/MIN/1.73SQ M
GLUCOSE SERPL-MCNC: 126 MG/DL (ref 65–140)
GLUCOSE UR STRIP-MCNC: NEGATIVE MG/DL
HCT VFR BLD AUTO: 50.6 % (ref 36.5–49.3)
HGB BLD-MCNC: 15.9 G/DL (ref 12–17)
HGB UR QL STRIP.AUTO: NEGATIVE
IMM GRANULOCYTES # BLD AUTO: 0.06 THOUSAND/UL (ref 0–0.2)
IMM GRANULOCYTES NFR BLD AUTO: 1 % (ref 0–2)
KETONES UR STRIP-MCNC: NEGATIVE MG/DL
LACTATE SERPL-SCNC: 1.7 MMOL/L (ref 0.5–2)
LEUKOCYTE ESTERASE UR QL STRIP: NEGATIVE
LIPASE SERPL-CCNC: 16 U/L (ref 11–82)
LYMPHOCYTES # BLD AUTO: 1.06 THOUSANDS/ÂΜL (ref 0.6–4.47)
LYMPHOCYTES NFR BLD AUTO: 14 % (ref 14–44)
MCH RBC QN AUTO: 25 PG (ref 26.8–34.3)
MCHC RBC AUTO-ENTMCNC: 31.4 G/DL (ref 31.4–37.4)
MCV RBC AUTO: 79 FL (ref 82–98)
MONOCYTES # BLD AUTO: 0.21 THOUSAND/ÂΜL (ref 0.17–1.22)
MONOCYTES NFR BLD AUTO: 3 % (ref 4–12)
NEUTROPHILS # BLD AUTO: 6.26 THOUSANDS/ÂΜL (ref 1.85–7.62)
NEUTS SEG NFR BLD AUTO: 80 % (ref 43–75)
NITRITE UR QL STRIP: NEGATIVE
NRBC BLD AUTO-RTO: 0 /100 WBCS
P AXIS: 53 DEGREES
PH UR STRIP.AUTO: 7.5 [PH]
PLATELET # BLD AUTO: 275 THOUSANDS/UL (ref 149–390)
PMV BLD AUTO: 10.2 FL (ref 8.9–12.7)
POTASSIUM SERPL-SCNC: 4.2 MMOL/L (ref 3.5–5.3)
PR INTERVAL: 142 MS
PROT SERPL-MCNC: 6.9 G/DL (ref 6.4–8.4)
PROT UR STRIP-MCNC: NEGATIVE MG/DL
QRS AXIS: 36 DEGREES
QRSD INTERVAL: 92 MS
QT INTERVAL: 380 MS
QTC INTERVAL: 438 MS
RBC # BLD AUTO: 6.37 MILLION/UL (ref 3.88–5.62)
SODIUM SERPL-SCNC: 139 MMOL/L (ref 135–147)
SP GR UR STRIP.AUTO: 1.01 (ref 1–1.03)
T WAVE AXIS: -2 DEGREES
UROBILINOGEN UR STRIP-ACNC: <2 MG/DL
VENTRICULAR RATE: 80 BPM
WBC # BLD AUTO: 7.68 THOUSAND/UL (ref 4.31–10.16)

## 2025-06-26 PROCEDURE — 93010 ELECTROCARDIOGRAM REPORT: CPT | Performed by: INTERNAL MEDICINE

## 2025-06-26 PROCEDURE — 80053 COMPREHEN METABOLIC PANEL: CPT | Performed by: EMERGENCY MEDICINE

## 2025-06-26 PROCEDURE — 83880 ASSAY OF NATRIURETIC PEPTIDE: CPT | Performed by: EMERGENCY MEDICINE

## 2025-06-26 PROCEDURE — 96374 THER/PROPH/DIAG INJ IV PUSH: CPT

## 2025-06-26 PROCEDURE — 83690 ASSAY OF LIPASE: CPT | Performed by: EMERGENCY MEDICINE

## 2025-06-26 PROCEDURE — 99223 1ST HOSP IP/OBS HIGH 75: CPT | Performed by: FAMILY MEDICINE

## 2025-06-26 PROCEDURE — 96376 TX/PRO/DX INJ SAME DRUG ADON: CPT

## 2025-06-26 PROCEDURE — 86140 C-REACTIVE PROTEIN: CPT | Performed by: EMERGENCY MEDICINE

## 2025-06-26 PROCEDURE — 71045 X-RAY EXAM CHEST 1 VIEW: CPT

## 2025-06-26 PROCEDURE — 96375 TX/PRO/DX INJ NEW DRUG ADDON: CPT

## 2025-06-26 PROCEDURE — 96361 HYDRATE IV INFUSION ADD-ON: CPT

## 2025-06-26 PROCEDURE — 99285 EMERGENCY DEPT VISIT HI MDM: CPT | Performed by: EMERGENCY MEDICINE

## 2025-06-26 PROCEDURE — 81003 URINALYSIS AUTO W/O SCOPE: CPT | Performed by: EMERGENCY MEDICINE

## 2025-06-26 PROCEDURE — 36415 COLL VENOUS BLD VENIPUNCTURE: CPT | Performed by: EMERGENCY MEDICINE

## 2025-06-26 PROCEDURE — 83605 ASSAY OF LACTIC ACID: CPT | Performed by: EMERGENCY MEDICINE

## 2025-06-26 PROCEDURE — 99284 EMERGENCY DEPT VISIT MOD MDM: CPT

## 2025-06-26 PROCEDURE — 99222 1ST HOSP IP/OBS MODERATE 55: CPT | Performed by: PHYSICIAN ASSISTANT

## 2025-06-26 PROCEDURE — 93005 ELECTROCARDIOGRAM TRACING: CPT

## 2025-06-26 PROCEDURE — 74177 CT ABD & PELVIS W/CONTRAST: CPT

## 2025-06-26 PROCEDURE — 85025 COMPLETE CBC W/AUTO DIFF WBC: CPT | Performed by: EMERGENCY MEDICINE

## 2025-06-26 PROCEDURE — 84484 ASSAY OF TROPONIN QUANT: CPT | Performed by: EMERGENCY MEDICINE

## 2025-06-26 RX ORDER — KETOROLAC TROMETHAMINE 30 MG/ML
15 INJECTION, SOLUTION INTRAMUSCULAR; INTRAVENOUS ONCE
Status: COMPLETED | OUTPATIENT
Start: 2025-06-26 | End: 2025-06-26

## 2025-06-26 RX ORDER — ONDANSETRON 2 MG/ML
4 INJECTION INTRAMUSCULAR; INTRAVENOUS EVERY 6 HOURS PRN
Status: DISCONTINUED | OUTPATIENT
Start: 2025-06-26 | End: 2025-06-29 | Stop reason: HOSPADM

## 2025-06-26 RX ORDER — ENOXAPARIN SODIUM 100 MG/ML
40 INJECTION SUBCUTANEOUS
Status: DISCONTINUED | OUTPATIENT
Start: 2025-06-27 | End: 2025-06-29 | Stop reason: HOSPADM

## 2025-06-26 RX ORDER — HYDROMORPHONE HCL/PF 1 MG/ML
0.5 SYRINGE (ML) INJECTION ONCE
Status: COMPLETED | OUTPATIENT
Start: 2025-06-26 | End: 2025-06-26

## 2025-06-26 RX ORDER — HYDROMORPHONE HCL IN WATER/PF 6 MG/30 ML
0.2 PATIENT CONTROLLED ANALGESIA SYRINGE INTRAVENOUS ONCE
Status: COMPLETED | OUTPATIENT
Start: 2025-06-26 | End: 2025-06-26

## 2025-06-26 RX ORDER — ONDANSETRON 2 MG/ML
4 INJECTION INTRAMUSCULAR; INTRAVENOUS ONCE
Status: COMPLETED | OUTPATIENT
Start: 2025-06-26 | End: 2025-06-26

## 2025-06-26 RX ORDER — SODIUM CHLORIDE, SODIUM GLUCONATE, SODIUM ACETATE, POTASSIUM CHLORIDE, MAGNESIUM CHLORIDE, SODIUM PHOSPHATE, DIBASIC, AND POTASSIUM PHOSPHATE .53; .5; .37; .037; .03; .012; .00082 G/100ML; G/100ML; G/100ML; G/100ML; G/100ML; G/100ML; G/100ML
125 INJECTION, SOLUTION INTRAVENOUS CONTINUOUS
Status: DISCONTINUED | OUTPATIENT
Start: 2025-06-26 | End: 2025-06-28

## 2025-06-26 RX ORDER — AMLODIPINE BESYLATE 5 MG/1
5 TABLET ORAL ONCE
Status: COMPLETED | OUTPATIENT
Start: 2025-06-26 | End: 2025-06-26

## 2025-06-26 RX ORDER — AMLODIPINE BESYLATE 5 MG/1
5 TABLET ORAL DAILY
Status: DISCONTINUED | OUTPATIENT
Start: 2025-06-26 | End: 2025-06-27

## 2025-06-26 RX ORDER — METHYLPREDNISOLONE SODIUM SUCCINATE 40 MG/ML
20 INJECTION, POWDER, LYOPHILIZED, FOR SOLUTION INTRAMUSCULAR; INTRAVENOUS EVERY 12 HOURS SCHEDULED
Status: DISCONTINUED | OUTPATIENT
Start: 2025-06-26 | End: 2025-06-27

## 2025-06-26 RX ORDER — METHYLPREDNISOLONE SODIUM SUCCINATE 125 MG/2ML
60 INJECTION, POWDER, LYOPHILIZED, FOR SOLUTION INTRAMUSCULAR; INTRAVENOUS ONCE
Status: COMPLETED | OUTPATIENT
Start: 2025-06-26 | End: 2025-06-26

## 2025-06-26 RX ADMIN — METHYLPREDNISOLONE SODIUM SUCCINATE 20 MG: 40 INJECTION, POWDER, FOR SOLUTION INTRAMUSCULAR; INTRAVENOUS at 17:34

## 2025-06-26 RX ADMIN — HYDROMORPHONE HYDROCHLORIDE 0.5 MG: 1 INJECTION, SOLUTION INTRAMUSCULAR; INTRAVENOUS; SUBCUTANEOUS at 05:18

## 2025-06-26 RX ADMIN — SODIUM CHLORIDE, SODIUM GLUCONATE, SODIUM ACETATE, POTASSIUM CHLORIDE, MAGNESIUM CHLORIDE, SODIUM PHOSPHATE, DIBASIC, AND POTASSIUM PHOSPHATE 125 ML/HR: .53; .5; .37; .037; .03; .012; .00082 INJECTION, SOLUTION INTRAVENOUS at 20:28

## 2025-06-26 RX ADMIN — KETOROLAC TROMETHAMINE 15 MG: 30 INJECTION, SOLUTION INTRAMUSCULAR at 11:04

## 2025-06-26 RX ADMIN — AMLODIPINE BESYLATE 5 MG: 5 TABLET ORAL at 07:41

## 2025-06-26 RX ADMIN — HYDROMORPHONE HYDROCHLORIDE 0.2 MG: 0.2 INJECTION, SOLUTION INTRAMUSCULAR; INTRAVENOUS; SUBCUTANEOUS at 23:07

## 2025-06-26 RX ADMIN — METHYLPREDNISOLONE SODIUM SUCCINATE 60 MG: 125 INJECTION, POWDER, FOR SOLUTION INTRAMUSCULAR; INTRAVENOUS at 11:09

## 2025-06-26 RX ADMIN — SODIUM CHLORIDE, SODIUM GLUCONATE, SODIUM ACETATE, POTASSIUM CHLORIDE, MAGNESIUM CHLORIDE, SODIUM PHOSPHATE, DIBASIC, AND POTASSIUM PHOSPHATE 125 ML/HR: .53; .5; .37; .037; .03; .012; .00082 INJECTION, SOLUTION INTRAVENOUS at 13:07

## 2025-06-26 RX ADMIN — ONDANSETRON 4 MG: 2 INJECTION INTRAMUSCULAR; INTRAVENOUS at 10:39

## 2025-06-26 RX ADMIN — IOHEXOL 100 ML: 350 INJECTION, SOLUTION INTRAVENOUS at 06:37

## 2025-06-26 RX ADMIN — ONDANSETRON 4 MG: 2 INJECTION INTRAMUSCULAR; INTRAVENOUS at 05:17

## 2025-06-26 RX ADMIN — SODIUM CHLORIDE 1000 ML: 0.9 INJECTION, SOLUTION INTRAVENOUS at 05:20

## 2025-06-26 NOTE — ASSESSMENT & PLAN NOTE
51 y/o male who presented to the ED today with abdominal pain, blood-streaked vomiting, and diarrhea.  Patient has long history of Crohn's disease with previous SBO and right hemicolectomy x 2 in 2011 and 2012.  CT today shows colitis with enlargement of known right renal mass.    When CT compared to 0079-6156 surveillance scans, the two masses measured 1.8 lower pole (bosniak 3- mri 2014) and 2.2cm upper pole (bosniak 2- mri 2014) from 2014 to 2022 and began to grow in 2023 scan 3.4 lower pole and 2.2cm upper pole through 2025 now measuring 4.1 lower pole and 2.2 cm upper pole respectively. There were no findings of hydronephrosis.     Patient was previously followed at [facility] from [id number].  Masses were stable without growth at that time.  Per chart review, patient's last urology visit there was 2020.    Patient was directed to follow-up with urology on prior admission in December 2023 with mass measuring 2.8 cm at that time.  Patient did not follow-up.    AVSS  WBC 7.68  HGB 15.9  UA neg  BUN/Cr 14/0.89, at baseline    Patient complains of abdominal pain currently that feels similar to previous flares of Crohn's disease.  Denies any fevers or chills.  Currently nausea has improved.  He denies any blood in his stools with this episode.  He denies any difficulty with passing urine or hematuria.  No dysuria.  No right flank pain.  Patient has also discontinued his Crohn's medications.    Abdominal exam with tenderness over mid abdomen.  Mild abdominal distention.  No rebound or guarding.  No CVA tenderness.  Few bowel sounds.    PLAN:  Change in right lower pole renal mass previously stable around 2cm now in past 2-3 years of imaging review growth to over 4cm. Other right upper pole mass is the more cystic appearing of the two and has not changed remains 2.2cm.     Recommend office discussion with robotic urologic surgeon to evaluate his surgical options and plan in light of his previous extensive  abdominal surgeries.  Office will make arrangements for consultation in [place], or patient can follow-up with his established urologist from previous years.  Upon discussion patient agrees to follow-up with our group.  No plans for urologic intervention on this admission.    Discussed with Nina Morales, urology AP

## 2025-06-26 NOTE — H&P
H&P - Hospitalist   Name: Mohit Farley 50 y.o. male I MRN: [id number]  Unit/Bed#: 427-01 I Date of Admission: 6/26/2025   Date of Service: 6/26/2025 I Hospital Day: 0     Assessment & Plan  Crohn's disease of colon with complication (HCC)  S/P right hemicolectomy  -history and presentation consistent with Crohn disease exacerbation. There is no evidence of severe colitis or toxic megacolon  -received solumedrol 60mg IV once in ED  -c/w solumedrol 20mg IV bid  -consulted gastroenterology who will see him on 6/27  -he will have to discuss with outpatient GI clinic about maintenance medication for crohn's disease  -NPO for bowel rest  -IVF  -zofran PRN for nausea, vomiting  -advance diet on 6/27  Right kidney mass  -CT scan with very concerning finding for renal cell carcinoma  -inpatient urology consultation - who recommend outpatient robotic urology surgery. I discussed with patient and his friend about the importance of following up, high risk of kidney cancer if not promptly addressed  Essential (primary) hypertension  -resume home amlodipine, previously lost to follow up      VTE Pharmacologic Prophylaxis:   lovenox  Code Status: Level 1 - Full Code   Discussion with family: best friend at bedside    Anticipated Length of Stay: Patient will be admitted on an observation basis with an anticipated length of stay of less than 2 midnights secondary to crohn disease exacerbation.    History of Present Illness   Chief Complaint: nausea, vomiting    51yo man with Crohn disease s/p right colectomy, history of SBO, known right renal mass, presented on 6/26 for abdominal pain, nausea, and vomiting x 2-3 times in 24 hours. The initial vomiting has blood, otherwise non-bloody. Denies diarrhea. Reports increased abdominal distension and abdominal pain, worse in LLQ. Reports having very thin stool coming out from the rectum, requiring significant straining to go to bathroom. Patient is lost to follow up with outpatient GI,  whereas he was previously taking immunosuppressant and biologic for his Crohn's disease. Last time he used any maintenance medication was 1 year ago.     Review of Systems   Constitutional:  Negative for activity change and fever.   HENT:  Negative for hearing loss and sore throat.    Eyes:  Negative for discharge and visual disturbance.   Respiratory:  Negative for cough and shortness of breath.    Cardiovascular:  Negative for chest pain and palpitations.   Gastrointestinal:  Positive for abdominal distention, abdominal pain, nausea and vomiting. Negative for constipation and diarrhea.   Genitourinary:  Negative for difficulty urinating and dysuria.   Musculoskeletal:  Negative for arthralgias and back pain.   Skin:  Negative for color change and rash.   Neurological:  Negative for dizziness and light-headedness.       Historical Information   Past Medical History[1]  Past Surgical History[2]  Social History[3]  E-Cigarette/Vaping    E-Cigarette Use Never User      E-Cigarette/Vaping Substances       Social History:  Marital Status: Single   Occupation: non contributory  Patient Pre-hospital Living Situation: Home  Patient Pre-hospital Level of Mobility: walks  Patient Pre-hospital Diet Restrictions: none    Meds/Allergies   I have reviewed home medications with patient personally.  Prior to Admission medications    Medication Sig Start Date End Date Taking? Authorizing Provider   amLODIPine (NORVASC) 2.5 mg tablet Take 2.5 mg by mouth daily 9/14/23   Historical Provider, MD   omeprazole (PriLOSEC) 40 MG capsule Take 40 mg by mouth daily 11/29/23   Historical Provider, MD   Upadacitinib ER (Rinvoq) 45 MG TB24 Take 45 mg by mouth daily Please do not resume until your follow up with GI 12/10/23   Galilea Pope MD     Allergies   Allergen Reactions    Tetanus-Diphth-Acell Pertussis Other (See Comments)     Swelling, fever and redness at injection site       Objective :  Temp:  [97.5 °F (36.4 °C)-98.4 °F (36.9  "°C)] 98.2 °F (36.8 °C)  HR:  [70-81] 70  BP: (154-203)/() 154/95  Resp:  [16-18] 18  SpO2:  [92 %-99 %] 97 %  O2 Device: None (Room air)    Physical Exam  Vitals and nursing note reviewed.   Constitutional:       General: He is not in acute distress.     Appearance: He is well-developed.   HENT:      Head: Normocephalic and atraumatic.     Eyes:      Conjunctiva/sclera: Conjunctivae normal.       Cardiovascular:      Rate and Rhythm: Normal rate and regular rhythm.      Heart sounds: No murmur heard.  Pulmonary:      Effort: Pulmonary effort is normal. No respiratory distress.      Breath sounds: Normal breath sounds.   Abdominal:      General: Bowel sounds are normal. There is distension.      Palpations: Abdomen is soft. There is no fluid wave, hepatomegaly or mass.      Tenderness: There is no abdominal tenderness. There is no guarding or rebound.      Hernia: No hernia is present.     Musculoskeletal:         General: No swelling.      Cervical back: Neck supple.     Skin:     General: Skin is warm and dry.      Capillary Refill: Capillary refill takes less than 2 seconds.     Neurological:      Mental Status: He is alert.     Psychiatric:         Mood and Affect: Mood normal.          Lines/Drains:            Lab Results: I have reviewed the following results:  Results from last 7 days   Lab Units 06/26/25  0516   WBC Thousand/uL 7.68   HEMOGLOBIN g/dL 15.9   HEMATOCRIT % 50.6*   PLATELETS Thousands/uL 275   SEGS PCT % 80*   LYMPHO PCT % 14   MONO PCT % 3*   EOS PCT % 1     Results from last 7 days   Lab Units 06/26/25  0532   SODIUM mmol/L 139   POTASSIUM mmol/L 4.2   CHLORIDE mmol/L 106   CO2 mmol/L 25   BUN mg/dL 14   CREATININE mg/dL 0.89   ANION GAP mmol/L 8   CALCIUM mg/dL 9.4   ALBUMIN g/dL 4.4   TOTAL BILIRUBIN mg/dL 0.30   ALK PHOS U/L 109*   ALT U/L 31   AST U/L 13   GLUCOSE RANDOM mg/dL 126             No results found for: \"HGBA1C\"  Results from last 7 days   Lab Units 06/26/25  0516   LACTIC " ACID mmol/L 1.7             Administrative Statements       ** Please Note: This note has been constructed using a voice recognition system. **         [1]   Past Medical History:  Diagnosis Date    Crohn's disease (HCC)    [2]   Past Surgical History:  Procedure Laterality Date    COLECTOMY      right hemicolectomy? 2012   [3]   Social History  Tobacco Use    Smoking status: Never    Smokeless tobacco: Never   Vaping Use    Vaping status: Never Used   Substance and Sexual Activity    Alcohol use: Never    Drug use: Not Currently

## 2025-06-26 NOTE — PLAN OF CARE
Problem: Nutrition/Hydration-ADULT  Goal: Nutrient/Hydration intake appropriate for improving, restoring or maintaining nutritional needs  Description: Monitor and assess patient's nutrition/hydration status for malnutrition. Collaborate with interdisciplinary team and initiate plan and interventions as ordered.  Monitor patient's weight and dietary intake as ordered or per policy. Utilize nutrition screening tool and intervene as necessary. Determine patient's food preferences and provide high-protein, high-caloric foods as appropriate.     INTERVENTIONS:  - Monitor oral intake, urinary output, labs, and treatment plans  - Assess nutrition and hydration status and recommend course of action  - Evaluate amount of meals eaten  - Assist patient with eating if necessary   - Allow adequate time for meals  - Recommend/ encourage appropriate diets, oral nutritional supplements, and vitamin/mineral supplements  - Provide specific nutrition/hydration education as appropriate  - Include patient/family/caregiver in decisions related to nutrition  Outcome: Progressing     Problem: PAIN - ADULT  Goal: Verbalizes/displays adequate comfort level or baseline comfort level  Description: Interventions:  - Encourage patient to monitor pain and request assistance  - Assess pain using appropriate pain scale (0-10 pain scale)  - Administer analgesics as ordered based on type and severity of pain and evaluate response  - Implement non-pharmacological measures as appropriate and evaluate response  - Consider cultural and social influences on pain and pain management  - Notify physician/advanced practitioner if interventions unsuccessful or patient reports new pain  - Educate patient/family on pain management process including their role and importance of  reporting pain   - Provide non-pharmacologic/complimentary pain relief interventions  Outcome: Progressing     Problem: INFECTION - ADULT  Goal: Absence or prevention of progression  during hospitalization  Description: INTERVENTIONS:  - Assess and monitor for signs and symptoms of infection  - Monitor lab/diagnostic results  - Monitor all insertion sites, i.e. indwelling lines  - Administer medications as ordered  - Instruct and encourage patient and family to use good hand hygiene technique   - Identify and instruct in appropriate isolation precautions for identified infection/condition (strict contact and hand hygiene)  Outcome: Progressing     Problem: SAFETY ADULT  Goal: Patient will remain free of falls  Description: INTERVENTIONS:  - Educate patient/family on patient safety including physical limitations  - Instruct patient to call for assistance with activity (independent)  - Consider consulting OT/PT to assist with strengthening/mobility based on AM PAC & -HL score  - Consult OT/PT to assist with strengthening/mobility   - Keep Call bell within reach  - Keep bed low and locked with side rails adjusted as appropriate  - Keep care items and personal belongings within reach  - Initiate and maintain comfort rounds  - Make Fall Risk Sign visible to staff (low fall risk)  - Offer Toileting every 1-2 Hours, in advance of need  - Obtain necessary fall risk management equipment: nonskid footwear  Outcome: Progressing  Goal: Maintain or return to baseline ADL function  Description: INTERVENTIONS:  -  Assess patient's ability to carry out ADLs; (independent)  - Assess/evaluate cause of self-care deficits   - Assess range of motion  - Assess patient's mobility; (independent)  - Assess patient's need for assistive devices and provide as appropriate  - Encourage maximum independence but intervene and supervise when necessary  - Involve family in performance of ADLs  - Assess for home care needs following discharge   - Consider OT consult to assist with ADL evaluation and planning for discharge  - Provide patient education as appropriate  - Monitor functional capacity and physical performance, use  of AM PAC & JH-HLM   - Monitor gait, balance and fatigue with ambulation    Outcome: Progressing  Goal: Maintains/Returns to pre admission functional level  Description: INTERVENTIONS:  - Perform AM-PAC 6 Click Basic Mobility/ Daily Activity assessment daily.  - Set and communicate daily mobility goal to care team and patient/family/caregiver.   - Collaborate with rehabilitation services on mobility goals if consulted  - Ambulate patient 4-6 times a day  - Out of bed to chair 3 times a day   - Out of bed for meals 3 times a day  - Out of bed for toileting  - Record patient progress and toleration of activity level   Outcome: Progressing     Problem: DISCHARGE PLANNING  Goal: Discharge to home or other facility with appropriate resources  Description: INTERVENTIONS:  - Identify barriers to discharge w/patient and caregiver  - Arrange for needed discharge resources and transportation as appropriate  - Identify discharge learning needs (meds, wound care, etc.)  - Refer to Case Management Department for coordinating discharge planning if the patient needs post-hospital services based on physician/advanced practitioner order or complex needs related to functional status, cognitive ability, or social support system  Outcome: Progressing     Problem: Knowledge Deficit  Goal: Patient/family/caregiver demonstrates understanding of disease process, treatment plan, medications, and discharge instructions  Description: Complete learning assessment and assess knowledge base.  Interventions:  - Provide teaching at level of understanding  - Provide teaching via preferred learning methods  Outcome: Progressing     Problem: GASTROINTESTINAL - ADULT  Goal: Minimal or absence of nausea and/or vomiting  Description: INTERVENTIONS:  - Administer IV fluids if ordered to ensure adequate hydration  - Maintain NPO status until nausea and vomiting are resolved  - Nasogastric tube if ordered  - Administer ordered antiemetic medications as  needed  - Provide nonpharmacologic comfort measures as appropriate  - Advance diet as tolerated, if ordered  - Consider nutrition services referral to assist patient with adequate nutrition and appropriate food choices  Outcome: Progressing  Goal: Maintains or returns to baseline bowel function  Description: INTERVENTIONS:  - Assess bowel function  - Encourage oral fluids to ensure adequate hydration  - Administer IV fluids if ordered to ensure adequate hydration  - Administer ordered medications as needed  - Encourage mobilization and activity  - Consider nutritional services referral to assist patient with adequate nutrition and appropriate food choices  Outcome: Progressing  Goal: Maintains adequate nutritional intake  Description: INTERVENTIONS:  - Monitor percentage of each meal consumed  - Identify factors contributing to decreased intake, treat as appropriate  - Assist with meals as needed  - Monitor I&O, weight, and lab values if indicated  - Obtain nutrition services referral as needed  Outcome: Progressing

## 2025-06-26 NOTE — Clinical Note
Mohit Farley was seen and treated in our emergency department on 6/26/2025.                Diagnosis:     Mohit  .    He may return on this date:     Patient seen and examined Emergency Department on 6/26/2025.  Admitted to the hospital.  Please excuse for any absence today.     If you have any questions or concerns, please don't hesitate to call.      Rikki Damian, DO    ______________________________           _______________          _______________  Hospital Representative                              Date                                Time

## 2025-06-26 NOTE — ASSESSMENT & PLAN NOTE
-CT scan with very concerning finding for renal cell carcinoma  -inpatient urology consultation - who recommend outpatient robotic urology surgery. I discussed with patient and his friend about the importance of following up, high risk of kidney cancer if not promptly addressed

## 2025-06-26 NOTE — ASSESSMENT & PLAN NOTE
Patient with symptoms of Crohn's disease flare.  Admits to discontinuing his Crohn's medications.  Continue with GI consultation  Follow stool studies

## 2025-06-26 NOTE — TELEPHONE ENCOUNTER
pt seen in consult at [facility] today for right renal masses. Previously seen at [facility] on surveillance [id number]. One of the masses has grown from 2 to 4cm and more likely to consider surgical tx. Please offer pt consultation with Dr Ortega at [facility] to discuss difficult robotic surgical options. Pt may choose to go back to [place] but in case nothing is secured yet need to establish one or the other to confirm he has close followup

## 2025-06-26 NOTE — ASSESSMENT & PLAN NOTE
-history and presentation consistent with Crohn disease exacerbation. There is no evidence of severe colitis or toxic megacolon  -received solumedrol 60mg IV once in ED  -c/w solumedrol 20mg IV bid  -consulted gastroenterology who will see him on 6/27  -he will have to discuss with outpatient GI clinic about maintenance medication for crohn's disease  -NPO for bowel rest  -IVF  -zofran PRN for nausea, vomiting  -advance diet on 6/27

## 2025-06-26 NOTE — ED PROVIDER NOTES
Time reflects when diagnosis was documented in both MDM as applicable and the Disposition within this note       Time User Action Codes Description Comment    6/26/2025  7:02 AM Toby Pickett [R10.9] Abdominal pain     6/26/2025  7:02 AM Toby Pickett [R11.2] Nausea and vomiting     6/26/2025  7:02 AM Toby Pickett Add [K62.5] Rectal bleeding     6/26/2025 11:02 AM Jose Daniel Mendenhall Add [K50.118] Crohn's colitis, other complication (HCC)     6/26/2025 11:03 AM Rikki Damian [K50.10] Crohn's colitis, without complications (HCC)     6/26/2025 11:03 AM Rikki Damian [C64.1] Renal malignant neoplasm, right (HCC)           ED Disposition       ED Disposition   Admit    Condition   Stable    Date/Time   Thu Jun 26, 2025 11:02 AM    Comment   Case was discussed with JOAN and the patient's admission status was agreed to be Admission Status: observation status to the service of Dr. Mendenhall .               Assessment & Plan       Medical Decision Making  I reviewed the patient's medical chart, PMHx, prior encounters, medications.    My DDx includes: Crohn's flare, gastritis, PUD, cholecystitis, pancreatitis, appendicitis, diverticulitis, gastroenteritis, kidney stone, viral syndrome (including covid, flu, RSV). At risk for UTI, pyelonephritis.    Patient's symptoms are most consistent with Crohn's flare, however given prior history of SBO, will perform CT scan to evaluate for SBO as well.    Will obtain GI labs including CBC, CMP to evaluate electrolytes and kidney function, lipase to evaluate pancreas. Will check UA. Will treat supportively, reassess.     Patient has no leukocytosis, no significant lab abnormalities otherwise.  Normal lactic acid.  Patient was signed out to Dr. Damian pending CT scan results and disposition.    Ultimately, patient was admitted by Dr. Damian.    Amount and/or Complexity of Data Reviewed  Labs: ordered. Decision-making details documented in  "ED Course.  Radiology: ordered.    Risk  Prescription drug management.  Decision regarding hospitalization.        ED Course as of 06/26/25 2207   u Jun 26, 2025 0525 WBC: 7.68  No leukocytosis       Medications   multi-electrolyte (ISOLYTE-S PH 7.4 equivalent) IV solution (has no administration in time range)   ondansetron (ZOFRAN) injection 4 mg (has no administration in time range)   HYDROmorphone (DILAUDID) injection 0.5 mg (0.5 mg Intravenous Given 6/26/25 0518)   ondansetron (ZOFRAN) injection 4 mg (4 mg Intravenous Given 6/26/25 0517)   sodium chloride 0.9 % bolus 1,000 mL (0 mL Intravenous Stopped 6/26/25 0624)   iohexol (OMNIPAQUE) 350 MG/ML injection (MULTI-DOSE) 100 mL (100 mL Intravenous Given 6/26/25 0637)   amLODIPine (NORVASC) tablet 5 mg (5 mg Oral Given 6/26/25 0741)   ondansetron (ZOFRAN) injection 4 mg (4 mg Intravenous Given 6/26/25 1039)   ketorolac (TORADOL) injection 15 mg (15 mg Intravenous Given 6/26/25 1104)   methylPREDNISolone sodium succinate (Solu-MEDROL) injection 60 mg (60 mg Intravenous Given 6/26/25 1109)       ED Risk Strat Scores                    No data recorded        SBIRT 22yo+      Flowsheet Row Most Recent Value   Initial Alcohol Screen: US AUDIT-C     1. How often do you have a drink containing alcohol? 0 Filed at: 06/26/2025 0457   2. How many drinks containing alcohol do you have on a typical day you are drinking?  0 Filed at: 06/26/2025 0457   3a. Male UNDER 65: How often do you have five or more drinks on one occasion? 0 Filed at: 06/26/2025 0457   Audit-C Score 0 Filed at: 06/26/2025 0457   BULMARO: How many times in the past year have you...    Used an illegal drug or used a prescription medication for non-medical reasons? Never Filed at: 06/26/2025 0457                            History of Present Illness       Chief Complaint   Patient presents with    Abdominal Pain     Patient presents to ED from mother's house w/c/o abdominal pain/bloating x \"a couple days\", " "patient reports he started vomiting tonight w/small amount bright red blood in vomit, patient reports bright red blood in stool w/\"swollen feeling down there like I have to strain a lot\", hx crohns, abd surgeries, and 2 bowel obstructions, patient reports starting prednisone today        Past Medical History[1]   Past Surgical History[2]   Family History[3]   Social History[4]   E-Cigarette/Vaping    E-Cigarette Use Never User       E-Cigarette/Vaping Substances      I have reviewed and agree with the history as documented.     50-year-old male with a past medical history of Crohn's disease, small bowel obstruction, colectomy, who presents for worsening abdominal pain, nausea vomiting, bloody stools.  Patient reports that his symptoms began over the past several days, however he started having nausea and vomiting tonight that alarmed him.  He does notice some abdominal distention.  Reports some generalized abdominal pain worse in the left lower abdomen.  He denies any dysuria or frequency.  No constipation or diarrhea, however he does report that it is hard to have a bowel movement due to pain, and he has noticed bright red blood per rectum.  He noticed a small amount of blood with vomiting as well that alarmed him.  No fevers.  ROS otherwise negative.        Review of Systems   Constitutional:  Negative for chills, diaphoresis, fatigue and fever.   HENT:  Negative for congestion and sore throat.    Eyes:  Negative for visual disturbance.   Respiratory:  Negative for cough, chest tightness and shortness of breath.    Cardiovascular:  Negative for chest pain, palpitations and leg swelling.   Gastrointestinal:  Positive for abdominal pain, blood in stool, nausea and vomiting. Negative for abdominal distention, constipation and diarrhea.   Genitourinary:  Negative for difficulty urinating and dysuria.   Musculoskeletal:  Negative for arthralgias and myalgias.   Skin:  Negative for rash.   Neurological:  Negative for " dizziness, weakness, light-headedness, numbness and headaches.   Psychiatric/Behavioral:  Negative for agitation, behavioral problems and confusion. The patient is not nervous/anxious.    All other systems reviewed and are negative.          Objective       ED Triage Vitals   Temperature Pulse Blood Pressure Respirations SpO2 Patient Position - Orthostatic VS   06/26/25 0455 06/26/25 0455 06/26/25 0455 06/26/25 0455 06/26/25 0455 06/26/25 0530   97.5 °F (36.4 °C) 79 (!) 203/109 18 99 % Sitting      Temp Source Heart Rate Source BP Location FiO2 (%) Pain Score    06/26/25 0455 06/26/25 0530 06/26/25 0745 -- 06/26/25 0455    Temporal Monitor Left arm  6      Vitals      Date and Time Temp Pulse SpO2 Resp BP Pain Score FACES Pain Rating User   06/26/25 1200 -- -- -- -- -- No Pain -- MMK   06/26/25 1149 -- -- -- 18 -- -- -- DR   06/26/25 1149 98.2 °F (36.8 °C) 70 97 % -- 154/95 -- -- DII   06/26/25 1104 -- -- -- -- -- 5 -- [place]   06/26/25 1100 98.3 °F (36.8 °C) 76 95 % 16 177/105 5 --    06/26/25 0900 98.3 °F (36.8 °C) 79 96 % 18 179/100 2 -- [place]   06/26/25 0745 98.2 °F (36.8 °C) 78 97 % 18 162/97 2 --    06/26/25 0741 -- -- -- -- 162/97 -- --    06/26/25 0658 98.2 °F (36.8 °C) 76 96 % 18 176/112 2 -- LC   06/26/25 0600 98.4 °F (36.9 °C) 81 95 % 18 169/104 2 --    06/26/25 0530 97.5 °F (36.4 °C) 75 92 % -- 179/101 4 -- LC   06/26/25 0518 -- -- -- -- -- 6 --    06/26/25 0457 -- -- 99 % -- -- -- --    06/26/25 0455 97.5 °F (36.4 °C) 79 99 % 18 203/109 6 --             Physical Exam  Constitutional:       Appearance: He is well-developed.   HENT:      Head: Normocephalic and atraumatic.     Cardiovascular:      Rate and Rhythm: Normal rate and regular rhythm.      Heart sounds: Normal heart sounds. No murmur heard.  Pulmonary:      Effort: Pulmonary effort is normal. No respiratory distress.      Breath sounds: Normal breath sounds.   Abdominal:      General: Bowel sounds are normal. There is no distension.       Palpations: Abdomen is soft.      Tenderness: There is abdominal tenderness in the right lower quadrant, suprapubic area and left lower quadrant.      Comments: Bili does appear distended, firm.  He does have lower abdominal tenderness.     Musculoskeletal:         General: No deformity.     Skin:     General: Skin is warm.      Findings: No rash.     Neurological:      Mental Status: He is alert and oriented to person, place, and time.     Psychiatric:         Behavior: Behavior normal.         Thought Content: Thought content normal.         Judgment: Judgment normal.         Results Reviewed       Procedure Component Value Units Date/Time    HS Troponin I 4hr [[id number]]  (Normal) Collected: 06/26/25 1532    Lab Status: Final result Specimen: Blood from Arm, Left Updated: 06/26/25 1621     hs TnI 4hr 6 ng/L      Delta 4hr hsTnI 1 ng/L     HS Troponin I 2hr [[id number]]  (Normal) Collected: 06/26/25 1339    Lab Status: Final result Specimen: Blood from Arm, Left Updated: 06/26/25 1409     hs TnI 2hr 6 ng/L      Delta 2hr hsTnI 1 ng/L     B-Type Natriuretic Peptide(BNP) [[id number]]  (Normal) Collected: 06/26/25 1103    Lab Status: Final result Specimen: Blood from Arm, Right Updated: 06/26/25 1143     BNP 63 pg/mL     HS Troponin 0hr (reflex protocol) [[id number]]  (Normal) Collected: 06/26/25 1103    Lab Status: Final result Specimen: Blood from Arm, Right Updated: 06/26/25 1132     hs TnI 0hr 5 ng/L     Calprotectin,Fecal [[id number]]     Lab Status: No result Specimen: Stool     Clostridioides difficile toxin by PCR with EIA [[id number]]     Lab Status: No result Specimen: Stool     Stool Enteric Bacterial Panel by PCR [[id number]]     Lab Status: No result Specimen: Stool     Comprehensive metabolic panel [[id number]]  (Abnormal) Collected: 06/26/25 0532    Lab Status: Final result Specimen: Blood from Arm, Right Updated: 06/26/25 0555     Sodium 139 mmol/L      Potassium 4.2 mmol/L      Chloride 106 mmol/L      CO2  25 mmol/L      ANION GAP 8 mmol/L      BUN 14 mg/dL      Creatinine 0.89 mg/dL      Glucose 126 mg/dL      Calcium 9.4 mg/dL      AST 13 U/L      ALT 31 U/L      Alkaline Phosphatase 109 U/L      Total Protein 6.9 g/dL      Albumin 4.4 g/dL      Total Bilirubin 0.30 mg/dL      eGFR 99 ml/min/1.73sq m     Narrative:      [employer] guidelines for Chronic Kidney Disease (CKD):     Stage 1 with normal or high GFR (GFR > 90 mL/min/1.73 square meters)    Stage 2 Mild CKD (GFR = 60-89 mL/min/1.73 square meters)    Stage 3A Moderate CKD (GFR = 45-59 mL/min/1.73 square meters)    Stage 3B Moderate CKD (GFR = 30-44 mL/min/1.73 square meters)    Stage 4 Severe CKD (GFR = 15-29 mL/min/1.73 square meters)    Stage 5 End Stage CKD (GFR <15 mL/min/1.73 square meters)  Note: GFR calculation is accurate only with a steady state creatinine    Lipase [[id number]]  (Normal) Collected: 06/26/25 0532    Lab Status: Final result Specimen: Blood from Arm, Right Updated: 06/26/25 0555     Lipase 16 u/L     C-reactive protein [[id number]]  (Abnormal) Collected: 06/26/25 0532    Lab Status: Final result Specimen: Blood from Arm, Right Updated: 06/26/25 0555     CRP 3.6 mg/L     Lactic acid, plasma (w/reflex if result > 2.0) [[id number]]  (Normal) Collected: 06/26/25 0516    Lab Status: Final result Specimen: Blood from Arm, Right Updated: 06/26/25 0538     LACTIC ACID 1.7 mmol/L     Narrative:      Result may be elevated if tourniquet was used during collection.    UA w Reflex to Microscopic w Reflex to Culture [[id number]] Collected: 06/26/25 0524    Lab Status: Final result Specimen: Urine, Clean Catch Updated: 06/26/25 0531     Color, UA Colorless     Clarity, UA Clear     Specific Gravity, UA 1.015     pH, UA 7.5     Leukocytes, UA Negative     Nitrite, UA Negative     Protein, UA Negative mg/dl      Glucose, UA Negative mg/dl      Ketones, UA Negative mg/dl      Urobilinogen, UA <2.0 mg/dl      Bilirubin, UA Negative      Occult Blood, UA Negative    CBC and differential [[id number]]  (Abnormal) Collected: 06/26/25 0516    Lab Status: Final result Specimen: Blood from Arm, Right Updated: 06/26/25 0522     WBC 7.68 Thousand/uL      RBC 6.37 Million/uL      Hemoglobin 15.9 g/dL      Hematocrit 50.6 %      MCV 79 fL      MCH 25.0 pg      MCHC 31.4 g/dL      RDW 14.8 %      MPV 10.2 fL      Platelets 275 Thousands/uL      nRBC 0 /100 WBCs      Segmented % 80 %      Immature Grans % 1 %      Lymphocytes % 14 %      Monocytes % 3 %      Eosinophils Relative 1 %      Basophils Relative 1 %      Absolute Neutrophils 6.26 Thousands/µL      Absolute Immature Grans 0.06 Thousand/uL      Absolute Lymphocytes 1.06 Thousands/µL      Absolute Monocytes 0.21 Thousand/µL      Eosinophils Absolute 0.04 Thousand/µL      Basophils Absolute 0.05 Thousands/µL             XR chest 1 view portable   Final Interpretation by Belén Rouse MD (06/26 1835)      No acute cardiopulmonary disease.            Workstation performed: [id number]         CT abdomen pelvis with contrast   Final Interpretation by Radha Vogel MD (06/26 1281)      Colitis, as described above. Please see discussion. This could be infectious or inflammatory and may possibly be related to the patient's history of Crohn's disease. Clinical correlation and follow-up recommended. Gastroenterology consultation    recommended.      There is an approximately 4.5 cm heterogeneously enhancing mass involving the medial aspect of the lower pole right kidney and the inferior aspect of the interpolar region right kidney, larger compared with December 5, 2023. The appearance of this mass    is consistent with malignant neoplasm. Urology consultation and follow-up recommended.      There is an approximately 2.2 cm hypodense lesion arising from the medial aspect of the upper pole right kidney which is indeterminate. Follow-up is required. Renal protocol CT or MRI is recommended for further  characterization, if there are no    contraindications.      Splenomegaly.      Mild hepatic steatosis.      Other nonemergent findings as above.      This examination demonstrates findings for which clinical and imaging follow-up is recommended and was logged as such in EPIC.         I personally discussed this study with FLORENTIN TEJADA  6/26/2025 8:10 AM.               Workstation performed: [id number]             Procedures    ED Medication and Procedure Management   Prior to Admission Medications   Prescriptions Last Dose Informant Patient Reported? Taking?   Upadacitinib ER (Rinvoq) 45 MG TB24   No No   Sig: Take 45 mg by mouth daily Please do not resume until your follow up with GI   amLODIPine (NORVASC) 2.5 mg tablet   Yes No   Sig: Take 2.5 mg by mouth daily   omeprazole (PriLOSEC) 40 MG capsule   Yes No   Sig: Take 40 mg by mouth daily      Facility-Administered Medications: None     Current Discharge Medication List        CONTINUE these medications which have NOT CHANGED    Details   amLODIPine (NORVASC) 2.5 mg tablet Take 2.5 mg by mouth daily      omeprazole (PriLOSEC) 40 MG capsule Take 40 mg by mouth daily      Upadacitinib ER (Rinvoq) 45 MG TB24 Take 45 mg by mouth daily Please do not resume until your follow up with GI  Refills: 0    Associated Diagnoses: Crohn's colitis, other complication (HCC)           No discharge procedures on file.  ED SEPSIS DOCUMENTATION   Time reflects when diagnosis was documented in both MDM as applicable and the Disposition within this note       Time User Action Codes Description Comment    6/26/2025  7:02 AM Toby Pickett Add [R10.9] Abdominal pain     6/26/2025  7:02 AM Toby Pickett Add [R11.2] Nausea and vomiting     6/26/2025  7:02 AM Toby Pickett Add [K62.5] Rectal bleeding     6/26/2025 11:02 AM Jose Daniel Mendenhall Add [K50.118] Crohn's colitis, other complication (HCC)     6/26/2025 11:03 AM Florentin Tejada Add [K50.10] Crohn's colitis,  without complications (HCC)     6/26/2025 11:03 AM Rikki Damian [C64.1] Renal malignant neoplasm, right (HCC)                    [1]   Past Medical History:  Diagnosis Date    Crohn's disease (HCC)    [2]   Past Surgical History:  Procedure Laterality Date    COLECTOMY      right hemicolectomy? 2012   [3] No family history on file.  [4]   Social History  Tobacco Use    Smoking status: Never    Smokeless tobacco: Never   Vaping Use    Vaping status: Never Used   Substance Use Topics    Alcohol use: Never    Drug use: Not Currently        Toby Pickett MD  06/26/25 3707

## 2025-06-26 NOTE — CONSULTS
Consultation - Urology   Name: Mohit Farley 50 y.o. male I MRN: [id number]  Unit/Bed#: 427-01 I Date of Admission: 6/26/2025   Date of Service: 6/26/2025 I Hospital Day: 0   Inpatient consult to Urology  Consult performed by: Camilla De La Garza PA-C  Consult ordered by: Jose Daniel Mendenhall MD        Physician Requesting Evaluation: Jose Daniel Mendenhall MD   Reason for Evaluation / Principal Problem: right renal mass     Assessment & Plan  Right kidney mass  49 y/o male who presented to the ED today with abdominal pain, blood-streaked vomiting, and diarrhea.  Patient has long history of Crohn's disease with previous SBO and right hemicolectomy x 2 in 2011 and 2012.  CT today shows colitis with enlargement of known right renal mass.    When CT compared to 4665-5189 surveillance scans, the two masses measured 1.8 lower pole (bosniak 3- mri 2014) and 2.2cm upper pole (bosniak 2- mri 2014) from 2014 to 2022 and began to grow in 2023 scan 3.4 lower pole and 2.2cm upper pole through 2025 now measuring 4.1 lower pole and 2.2 cm upper pole respectively. There were no findings of hydronephrosis.     Patient was previously followed at [facility] from 5646-0352.  Masses were stable without growth at that time.  Per chart review, patient's last urology visit there was 2020.    Patient was directed to follow-up with urology on prior admission in December 2023 with mass measuring 2.8 cm at that time.  Patient did not follow-up.    AVSS  WBC 7.68  HGB 15.9  UA neg  BUN/Cr 14/0.89, at baseline    Patient complains of abdominal pain currently that feels similar to previous flares of Crohn's disease.  Denies any fevers or chills.  Currently nausea has improved.  He denies any blood in his stools with this episode.  He denies any difficulty with passing urine or hematuria.  No dysuria.  No right flank pain.  Patient has also discontinued his Crohn's medications.    Abdominal exam with tenderness over mid abdomen.  Mild abdominal  distention.  No rebound or guarding.  No CVA tenderness.  Few bowel sounds.    PLAN:  Change in right lower pole renal mass previously stable around 2cm now in past 2-3 years of imaging review growth to over 4cm. Other right upper pole mass is the more cystic appearing of the two and has not changed remains 2.2cm.     Recommend office discussion with robotic urologic surgeon to evaluate his surgical options and plan in light of his previous extensive abdominal surgeries.  Office will make arrangements for consultation in [place], or patient can follow-up with his established urologist from previous years.  Upon discussion patient agrees to follow-up with our group.  No plans for urologic intervention on this admission.    Discussed with Nina Morales urologdylon AP    Crohn's colitis, other complication (HCC)  Patient with symptoms of Crohn's disease flare.  Admits to discontinuing his Crohn's medications.  Continue with GI consultation  Follow stool studies  Essential (primary) hypertension  Medical management  Please contact the SecureChat role for the Urology service with any questions/concerns.    History of Present Illness   Mohit Farley is a 50 y.o. male who presented to the ED today with abdominal pain, blood-streaked vomiting, and diarrhea.  Patient has long history of Crohn's disease with previous SBO and right hemicolectomy x 2 in 2011 and 2012.  CT today shows colitis with enlargement of known right renal mass.    When CT compared to 7057-1448 surveillance scans, the two masses measured 1.8 lower pole (bosniak 3- mri 2014) and 2.2cm upper pole (bosniak 2- mri 2014) from 2014 to 2022 and began to grow in 2023 scan 3.4 lower pole and 2.2cm upper pole through 2025 now measuring 4.1 lower pole and 2.2 cm upper pole respectively. There were no findings of hydronephrosis.     Patient was previously followed at [facility] from 0991-4318.  Masses were stable without growth at that time.  Per chart  review, patient's last urology visit there was 2020.    Patient was directed to follow-up with urology on prior admission in December 2023 with mass measuring 2.8 cm at that time.  Patient did not follow-up.    Patient complains of abdominal pain currently that feels similar to previous flares of Crohn's disease.  Denies any fevers or chills.  Currently nausea has improved.  He denies any blood in his stools with this episode.  He denies any difficulty with passing urine or hematuria.  No dysuria.  No right flank pain.  Patient has also discontinued his Crohn's medications.        Review of Systems   Constitutional: Negative.  Negative for chills, fever and unexpected weight change.   HENT: Negative.  Negative for ear pain and sore throat.    Eyes: Negative.  Negative for pain and visual disturbance.   Respiratory: Negative.  Negative for cough, shortness of breath and wheezing.    Cardiovascular: Negative.  Negative for chest pain and palpitations.   Gastrointestinal:  Positive for abdominal distention, diarrhea, nausea and vomiting. Negative for abdominal pain and blood in stool.        Hematemasis   Endocrine: Negative.    Genitourinary: Negative.  Negative for difficulty urinating, dysuria, flank pain and hematuria.   Musculoskeletal: Negative.  Negative for arthralgias and back pain.   Skin: Negative.  Negative for color change, rash and wound.   Allergic/Immunologic: Negative.    Neurological: Negative.  Negative for seizures, syncope and headaches.   Hematological: Negative.  Does not bruise/bleed easily.   Psychiatric/Behavioral: Negative.     All other systems reviewed and are negative.    Historical Information   Past Medical History[1]  Past Surgical History[2]  Social History[3]  E-Cigarette/Vaping    E-Cigarette Use Never User      E-Cigarette/Vaping Substances     Family history non-contributory    Objective :  Temp:  [97.5 °F (36.4 °C)-98.4 °F (36.9 °C)] 98.2 °F (36.8 °C)  HR:  [70-81] 70  BP:  (154-203)/() 154/95  Resp:  [16-18] 18  SpO2:  [92 %-99 %] 97 %  O2 Device: None (Room air)    I/O         06/24 0701 06/25 0700 06/25 0701 06/26 0700 06/26 0701 06/27 0700    IV Piggyback  1000     Total Intake(mL/kg)  1000 (11)     Net  +1000                    Physical Exam  Constitutional:       General: He is not in acute distress.     Appearance: He is well-developed. He is not diaphoretic.   HENT:      Head: Normocephalic and atraumatic.      Mouth/Throat:      Pharynx: No oropharyngeal exudate.     Eyes:      General: No scleral icterus.        Right eye: No discharge.         Left eye: No discharge.     Neck:      Thyroid: No thyromegaly.      Vascular: No JVD.      Trachea: No tracheal deviation.     Cardiovascular:      Rate and Rhythm: Normal rate and regular rhythm.      Heart sounds: Normal heart sounds. No murmur heard.  Pulmonary:      Effort: Pulmonary effort is normal. No respiratory distress.      Breath sounds: Normal breath sounds. No wheezing.   Abdominal:      General: Bowel sounds are normal.      Palpations: Abdomen is soft.      Comments: tenderness over mid abdomen.  Mild abdominal distention.  No rebound or guarding.  No CVA tenderness.  Few bowel sounds.       Musculoskeletal:         General: No swelling or deformity. Normal range of motion.      Cervical back: Normal range of motion and neck supple.     Skin:     General: Skin is warm and dry.      Findings: No rash.     Neurological:      General: No focal deficit present.      Mental Status: He is alert and oriented to person, place, and time.      Comments: No focal deficits   Psychiatric:         Behavior: Behavior normal.            Lab Results: I have reviewed the following results:  Recent Labs     06/26/25  0516 06/26/25  0532   WBC 7.68  --    HGB 15.9  --    HCT 50.6*  --      --    SODIUM  --  139   K  --  4.2   CL  --  106   CO2  --  25   BUN  --  14   CREATININE  --  0.89   GLUC  --  126   AST  --  13   ALT  " --  31   ALB  --  4.4   TBILI  --  0.30   ALKPHOS  --  109*     Lab Results   Component Value Date    COLORU Colorless 06/26/2025    CLARITYU Clear 06/26/2025    SPECGRAV 1.015 06/26/2025    PHUR 7.5 06/26/2025    LEUKOCYTESUR Negative 06/26/2025    NITRITE Negative 06/26/2025    GLUCOSEU Negative 06/26/2025    KETONESU Negative 06/26/2025    BILIRUBINUR Negative 06/26/2025    BLOODU Negative 06/26/2025   ,   No results found for: \"URINECX\"          VTE Prophylaxis: Enoxaparin (Lovenox)    Administrative Statements   I have spent a total time of 30 minutes in caring for this patient on the day of the visit/encounter including Patient and family education, Counseling / Coordination of care, Documenting in the medical record, Reviewing/placing orders in the medical record (including tests, medications, and/or procedures), Obtaining or reviewing history  , and Communicating with other healthcare professionals .         [1]   Past Medical History:  Diagnosis Date    Crohn's disease (HCC)    [2]   Past Surgical History:  Procedure Laterality Date    COLECTOMY      right hemicolectomy? 2012   [3]   Social History  Tobacco Use    Smoking status: Never    Smokeless tobacco: Never   Vaping Use    Vaping status: Never Used   Substance and Sexual Activity    Alcohol use: Never    Drug use: Not Currently     "

## 2025-06-27 LAB
ALBUMIN SERPL BCG-MCNC: 4 G/DL (ref 3.5–5)
ALP SERPL-CCNC: 94 U/L (ref 34–104)
ALT SERPL W P-5'-P-CCNC: 24 U/L (ref 7–52)
ANION GAP SERPL CALCULATED.3IONS-SCNC: 9 MMOL/L (ref 4–13)
APTT PPP: 27 SECONDS (ref 23–34)
AST SERPL W P-5'-P-CCNC: 9 U/L (ref 13–39)
BASOPHILS # BLD AUTO: 0.02 THOUSANDS/ÂΜL (ref 0–0.1)
BASOPHILS NFR BLD AUTO: 0 % (ref 0–1)
BILIRUB SERPL-MCNC: 0.49 MG/DL (ref 0.2–1)
BUN SERPL-MCNC: 16 MG/DL (ref 5–25)
CALCIUM SERPL-MCNC: 8.7 MG/DL (ref 8.4–10.2)
CHLORIDE SERPL-SCNC: 105 MMOL/L (ref 96–108)
CO2 SERPL-SCNC: 26 MMOL/L (ref 21–32)
CREAT SERPL-MCNC: 0.88 MG/DL (ref 0.6–1.3)
EOSINOPHIL # BLD AUTO: 0 THOUSAND/ÂΜL (ref 0–0.61)
EOSINOPHIL NFR BLD AUTO: 0 % (ref 0–6)
ERYTHROCYTE [DISTWIDTH] IN BLOOD BY AUTOMATED COUNT: 14 % (ref 11.6–15.1)
GFR SERPL CREATININE-BSD FRML MDRD: 100 ML/MIN/1.73SQ M
GLUCOSE SERPL-MCNC: 125 MG/DL (ref 65–140)
HCT VFR BLD AUTO: 43.1 % (ref 36.5–49.3)
HGB BLD-MCNC: 13.7 G/DL (ref 12–17)
IMM GRANULOCYTES # BLD AUTO: 0.08 THOUSAND/UL (ref 0–0.2)
IMM GRANULOCYTES NFR BLD AUTO: 1 % (ref 0–2)
INR PPP: 1.03 (ref 0.85–1.19)
LYMPHOCYTES # BLD AUTO: 0.76 THOUSANDS/ÂΜL (ref 0.6–4.47)
LYMPHOCYTES NFR BLD AUTO: 7 % (ref 14–44)
MAGNESIUM SERPL-MCNC: 2.3 MG/DL (ref 1.9–2.7)
MCH RBC QN AUTO: 24.6 PG (ref 26.8–34.3)
MCHC RBC AUTO-ENTMCNC: 31.8 G/DL (ref 31.4–37.4)
MCV RBC AUTO: 77 FL (ref 82–98)
MONOCYTES # BLD AUTO: 0.36 THOUSAND/ÂΜL (ref 0.17–1.22)
MONOCYTES NFR BLD AUTO: 3 % (ref 4–12)
NEUTROPHILS # BLD AUTO: 10.09 THOUSANDS/ÂΜL (ref 1.85–7.62)
NEUTS SEG NFR BLD AUTO: 89 % (ref 43–75)
NRBC BLD AUTO-RTO: 0 /100 WBCS
PHOSPHATE SERPL-MCNC: 3.6 MG/DL (ref 2.7–4.5)
PLATELET # BLD AUTO: 278 THOUSANDS/UL (ref 149–390)
PMV BLD AUTO: 9.6 FL (ref 8.9–12.7)
POTASSIUM SERPL-SCNC: 4 MMOL/L (ref 3.5–5.3)
PROT SERPL-MCNC: 6.1 G/DL (ref 6.4–8.4)
PROTHROMBIN TIME: 14 SECONDS (ref 12.3–15)
RBC # BLD AUTO: 5.57 MILLION/UL (ref 3.88–5.62)
SODIUM SERPL-SCNC: 140 MMOL/L (ref 135–147)
WBC # BLD AUTO: 11.31 THOUSAND/UL (ref 4.31–10.16)

## 2025-06-27 PROCEDURE — 85025 COMPLETE CBC W/AUTO DIFF WBC: CPT | Performed by: STUDENT IN AN ORGANIZED HEALTH CARE EDUCATION/TRAINING PROGRAM

## 2025-06-27 PROCEDURE — 85610 PROTHROMBIN TIME: CPT | Performed by: STUDENT IN AN ORGANIZED HEALTH CARE EDUCATION/TRAINING PROGRAM

## 2025-06-27 PROCEDURE — 85730 THROMBOPLASTIN TIME PARTIAL: CPT | Performed by: STUDENT IN AN ORGANIZED HEALTH CARE EDUCATION/TRAINING PROGRAM

## 2025-06-27 PROCEDURE — 83735 ASSAY OF MAGNESIUM: CPT | Performed by: STUDENT IN AN ORGANIZED HEALTH CARE EDUCATION/TRAINING PROGRAM

## 2025-06-27 PROCEDURE — 84100 ASSAY OF PHOSPHORUS: CPT | Performed by: STUDENT IN AN ORGANIZED HEALTH CARE EDUCATION/TRAINING PROGRAM

## 2025-06-27 PROCEDURE — 86803 HEPATITIS C AB TEST: CPT | Performed by: PHYSICIAN ASSISTANT

## 2025-06-27 PROCEDURE — 86480 TB TEST CELL IMMUN MEASURE: CPT | Performed by: PHYSICIAN ASSISTANT

## 2025-06-27 PROCEDURE — 80053 COMPREHEN METABOLIC PANEL: CPT | Performed by: STUDENT IN AN ORGANIZED HEALTH CARE EDUCATION/TRAINING PROGRAM

## 2025-06-27 PROCEDURE — 86704 HEP B CORE ANTIBODY TOTAL: CPT | Performed by: PHYSICIAN ASSISTANT

## 2025-06-27 PROCEDURE — 99232 SBSQ HOSP IP/OBS MODERATE 35: CPT | Performed by: FAMILY MEDICINE

## 2025-06-27 RX ORDER — PANTOPRAZOLE SODIUM 40 MG/1
40 TABLET, DELAYED RELEASE ORAL
Status: DISCONTINUED | OUTPATIENT
Start: 2025-06-27 | End: 2025-06-27

## 2025-06-27 RX ORDER — PANTOPRAZOLE SODIUM 40 MG/1
40 TABLET, DELAYED RELEASE ORAL
Status: DISCONTINUED | OUTPATIENT
Start: 2025-06-27 | End: 2025-06-29 | Stop reason: HOSPADM

## 2025-06-27 RX ORDER — METHYLPREDNISOLONE SODIUM SUCCINATE 40 MG/ML
20 INJECTION, POWDER, LYOPHILIZED, FOR SOLUTION INTRAMUSCULAR; INTRAVENOUS EVERY 8 HOURS SCHEDULED
Status: DISCONTINUED | OUTPATIENT
Start: 2025-06-27 | End: 2025-06-29 | Stop reason: HOSPADM

## 2025-06-27 RX ORDER — OXYCODONE HYDROCHLORIDE 5 MG/1
5 TABLET ORAL EVERY 4 HOURS PRN
Refills: 0 | Status: DISCONTINUED | OUTPATIENT
Start: 2025-06-27 | End: 2025-06-29 | Stop reason: HOSPADM

## 2025-06-27 RX ORDER — ACETAMINOPHEN 325 MG/1
650 TABLET ORAL EVERY 6 HOURS PRN
Status: DISCONTINUED | OUTPATIENT
Start: 2025-06-27 | End: 2025-06-29 | Stop reason: HOSPADM

## 2025-06-27 RX ORDER — AMLODIPINE BESYLATE 10 MG/1
10 TABLET ORAL DAILY
Status: DISCONTINUED | OUTPATIENT
Start: 2025-06-27 | End: 2025-06-29 | Stop reason: HOSPADM

## 2025-06-27 RX ADMIN — METHYLPREDNISOLONE SODIUM SUCCINATE 20 MG: 40 INJECTION, POWDER, FOR SOLUTION INTRAMUSCULAR; INTRAVENOUS at 09:09

## 2025-06-27 RX ADMIN — ENOXAPARIN SODIUM 40 MG: 40 INJECTION SUBCUTANEOUS at 09:09

## 2025-06-27 RX ADMIN — OXYCODONE HYDROCHLORIDE 5 MG: 5 TABLET ORAL at 23:14

## 2025-06-27 RX ADMIN — AMLODIPINE BESYLATE 10 MG: 10 TABLET ORAL at 09:09

## 2025-06-27 RX ADMIN — SODIUM CHLORIDE, SODIUM GLUCONATE, SODIUM ACETATE, POTASSIUM CHLORIDE, MAGNESIUM CHLORIDE, SODIUM PHOSPHATE, DIBASIC, AND POTASSIUM PHOSPHATE 125 ML/HR: .53; .5; .37; .037; .03; .012; .00082 INJECTION, SOLUTION INTRAVENOUS at 05:05

## 2025-06-27 RX ADMIN — METHYLPREDNISOLONE SODIUM SUCCINATE 20 MG: 40 INJECTION, POWDER, FOR SOLUTION INTRAMUSCULAR; INTRAVENOUS at 15:02

## 2025-06-27 RX ADMIN — METHYLPREDNISOLONE SODIUM SUCCINATE 20 MG: 40 INJECTION, POWDER, FOR SOLUTION INTRAMUSCULAR; INTRAVENOUS at 21:05

## 2025-06-27 RX ADMIN — OXYCODONE HYDROCHLORIDE 5 MG: 5 TABLET ORAL at 18:44

## 2025-06-27 RX ADMIN — SODIUM CHLORIDE, SODIUM GLUCONATE, SODIUM ACETATE, POTASSIUM CHLORIDE, MAGNESIUM CHLORIDE, SODIUM PHOSPHATE, DIBASIC, AND POTASSIUM PHOSPHATE 125 ML/HR: .53; .5; .37; .037; .03; .012; .00082 INJECTION, SOLUTION INTRAVENOUS at 23:19

## 2025-06-27 RX ADMIN — PANTOPRAZOLE SODIUM 40 MG: 40 TABLET, DELAYED RELEASE ORAL at 12:59

## 2025-06-27 RX ADMIN — SODIUM CHLORIDE, SODIUM GLUCONATE, SODIUM ACETATE, POTASSIUM CHLORIDE, MAGNESIUM CHLORIDE, SODIUM PHOSPHATE, DIBASIC, AND POTASSIUM PHOSPHATE 125 ML/HR: .53; .5; .37; .037; .03; .012; .00082 INJECTION, SOLUTION INTRAVENOUS at 15:02

## 2025-06-27 RX ADMIN — ACETAMINOPHEN 650 MG: 325 TABLET ORAL at 21:04

## 2025-06-27 NOTE — ASSESSMENT & PLAN NOTE
-history and presentation consistent with Crohn disease exacerbation. There is no evidence of severe colitis or toxic megacolon  -received solumedrol 60mg IV once in ED  -c/w solumedrol 20mg IV bid  -consulted gastroenterology who will see him on 6/27  -he will have to discuss with outpatient GI clinic about maintenance medication for crohn's disease  -NPO for bowel rest - after GI evaluation, plan to advance to liquid diet  -IVF  -zofran PRN for nausea, vomiting   Phuc

## 2025-06-27 NOTE — ASSESSMENT & PLAN NOTE
-CT scan with very concerning finding for renal cell carcinoma  -inpatient urology consultation - who recommend outpatient robotic urology surgery. I discussed with patient and his friend about the importance of following up, high risk of kidney cancer if not promptly addressed  -patient agreeable to follow up

## 2025-06-27 NOTE — UTILIZATION REVIEW
"    Initial Clinical Review    Admission: Date/Time/Statement:   Admission Orders (From admission, onward)       Ordered        06/26/25 1103  Place in Observation  Once                          Orders Placed This Encounter   Procedures    Place in Observation     Standing Status:   Standing     Number of Occurrences:   1     Level of Care:   Med Surg [16]     ED Arrival Information       Expected   -    Arrival   6/26/2025 04:44    Acuity   Urgent              Means of arrival   Walk-In    Escorted by   [facility]    Service   Hospitalist    Admission type   Emergency              Arrival complaint   Abdominal Pain             Chief Complaint   Patient presents with    Abdominal Pain     Patient presents to ED from mother's house w/c/o abdominal pain/bloating x \"a couple days\", patient reports he started vomiting tonight w/small amount bright red blood in vomit, patient reports bright red blood in stool w/\"swollen feeling down there like I have to strain a lot\", hx crohns, abd surgeries, and 2 bowel obstructions, patient reports starting prednisone today        Initial Presentation: 50 y.o. male presents to ed from home on 6-26-25 for evaluation and treatment of abdominal pain, bloating, vomiting, hemoptysis, swollen feeling, diarrhea.   Started prednisone today.   PMHX:  Crohn's, hemicolectomy x2.     Clinical assessment significant for hypertension 203/109 sustained, pain 6/10.  CRP 3.6. HCT 50.6, ALK PHOS 109. Imaging shows colitis, 4.5 cm mass in Right kidney larger since 12/23. Appearance consistent with malignant neoplasm. Splenomegaly.  Initially treated with iv dilaudid x1, iv zofranx1, iv .9% ns 1L bolus, iv zofran x1, iv toradol x1, iv solumedrol x1.  Admit to observation  for Chron's disease of colon, right renal mass enlargement.  Plan includes: iv solumedrol bid, NPO, GI consult, iv fluids, antibiotics prn or analgesia.  Consult urology.      Anticipated Length of Stay/Certification Statement:   Patient " will be admitted on an observation basis with an anticipated length of stay of less than 2 midnights secondary to crohn disease exacerbation.  Date: 6-27-25    Day 2: observation changed     GI consult: symptoms likely secondary to active Crohn's.  May require biologic therapy.    Advance to liquid diet.  Continue iv solumedrol bid.   Continue iv fluids.  Urology consult pending.     ED Treatment-Medication Administration from 06/26/2025 0444 to 06/26/2025 1135         Date/Time Order Dose Route Action     06/26/2025 0518 HYDROmorphone (DILAUDID) injection 0.5 mg 0.5 mg Intravenous Given     06/26/2025 0517 ondansetron (ZOFRAN) injection 4 mg 4 mg Intravenous Given     06/26/2025 0520 sodium chloride 0.9 % bolus 1,000 mL 1,000 mL Intravenous New Bag     06/26/2025 0741 amLODIPine (NORVASC) tablet 5 mg 5 mg Oral Given     06/26/2025 1039 ondansetron (ZOFRAN) injection 4 mg 4 mg Intravenous Given     06/26/2025 1104 ketorolac (TORADOL) injection 15 mg 15 mg Intravenous Given     06/26/2025 1109 methylPREDNISolone sodium succinate (Solu-MEDROL) injection 60 mg 60 mg Intravenous Given         Scheduled Medications:    amLODIPine, 10 mg, Oral, Daily  enoxaparin, 40 mg, Subcutaneous, Q24H JUANCHO  methylPREDNISolone sodium succinate, 20 mg, Intravenous, Q8H JUANCHO  pantoprazole, 40 mg, Oral, Early Morning      Continuous IV Infusions:  multi-electrolyte, 125 mL/hr, Intravenous, Continuous      PRN Meds:  ondansetron, 4 mg, Intravenous, Q6H PRN      ED Triage Vitals [06/26/25 0455]   Temperature Pulse Respirations Blood Pressure SpO2 Pain Score   97.5 °F (36.4 °C) 79 18 (!) 203/109 99 % 6     Weight (last 2 days)       Date/Time Weight    06/26/25 11:49:23 93.6 (206.35)    06/26/25 0455 90.7 (200)            Vital Signs (last 3 days)       Date/Time Temp Pulse Resp BP MAP (mmHg) SpO2 O2 Device [place] Coma Scale Score Pain    06/27/25 0910 -- -- -- -- -- -- -- -- 2    06/27/25 08:39:45 97.6 °F (36.4 °C) 82 17 139/88 105 97 % --  -- --    06/26/25 23:45:44 98.3 °F (36.8 °C) 89 -- 150/80 103 96 % -- -- --    06/26/25 2341 -- 89 -- -- -- 95 % -- -- --    06/26/25 2307 -- -- -- -- -- -- -- -- 4 06/26/25 2027 -- -- -- -- -- 94 % None (Room air) -- No Pain    06/26/25 1200 -- -- -- -- -- -- -- -- No Pain    06/26/25 11:49:23 98.2 °F (36.8 °C) 70 18 154/95 115 97 % None (Room air) -- --    06/26/25 1104 -- -- -- -- -- -- -- -- 5 06/26/25 1100 98.3 °F (36.8 °C) 76 16 177/105 134 95 % None (Room air) -- 5 06/26/25 0900 98.3 °F (36.8 °C) 79 18 179/100 130 96 % None (Room air) -- 2 06/26/25 0757 -- -- -- -- -- -- None (Room air) -- --    06/26/25 0756 -- -- -- -- -- -- -- 15 --    06/26/25 0745 98.2 °F (36.8 °C) 78 18 162/97 124 97 % None (Room air) -- 2 06/26/25 0741 -- -- -- 162/97 -- -- -- -- --    06/26/25 0658 98.2 °F (36.8 °C) 76 18 176/112 -- 96 % None (Room air) -- 2 06/26/25 0600 98.4 °F (36.9 °C) 81 18 169/104 130 95 % None (Room air) -- 2 06/26/25 0530 97.5 °F (36.4 °C) 75 -- 179/101 133 92 % None (Room air) -- 4 06/26/25 0518 -- -- -- -- -- -- -- -- 6    06/26/25 0502 -- -- -- -- -- -- None (Room air) -- --    06/26/25 0457 -- -- -- -- -- 99 % -- -- --    06/26/25 0455 97.5 °F (36.4 °C) 79 18 203/109 -- 99 % None (Room air) -- 6         Pertinent Labs/Diagnostic Test Results:   Radiology:  XR chest 1 view portable   Final  (06/26 1835)      No acute cardiopulmonary disease.         CT abdomen pelvis with contrast   Final (06/26 0843)      Colitis, as described above. Please see discussion. This could be infectious or inflammatory and may possibly be related to the patient's history of Crohn's disease. Clinical correlation and follow-up recommended. Gastroenterology consultation recommended.         There is an approximately 4.5 cm heterogeneously enhancing mass involving the medial aspect of the lower pole right kidney and the inferior aspect of the interpolar region right kidney, larger compared with December 5,  2023. The appearance of this mass is consistent with malignant neoplasm. Urology consultation and follow-up recommended.         There is an approximately 2.2 cm hypodense lesion arising from the medial aspect of the upper pole right kidney which is indeterminate. Follow-up is required. Renal protocol CT or MRI is recommended for further characterization, if there are no contraindications.      Splenomegaly.      Mild hepatic steatosis.      Other nonemergent findings as above.           Cardiology:  ECG 12 lead   Final (06/26 1632)   Normal sinus rhythm   Normal ECG              Results from last 7 days   Lab Units 06/27/25  0510 06/26/25  0516   WBC Thousand/uL 11.31* 7.68   HEMOGLOBIN g/dL 13.7 15.9   HEMATOCRIT % 43.1 50.6*   PLATELETS Thousands/uL 278 275   TOTAL NEUT ABS Thousands/µL 10.09* 6.26         Results from last 7 days   Lab Units 06/27/25  0510 06/26/25  0532   SODIUM mmol/L 140 139   POTASSIUM mmol/L 4.0 4.2   CHLORIDE mmol/L 105 106   CO2 mmol/L 26 25   ANION GAP mmol/L 9 8   BUN mg/dL 16 14   CREATININE mg/dL 0.88 0.89   EGFR ml/min/1.73sq m 100 99   CALCIUM mg/dL 8.7 9.4   MAGNESIUM mg/dL 2.3  --    PHOSPHORUS mg/dL 3.6  --      Results from last 7 days   Lab Units 06/27/25  0510 06/26/25  0532   AST U/L 9* 13   ALT U/L 24 31   ALK PHOS U/L 94 109*   TOTAL PROTEIN g/dL 6.1* 6.9   ALBUMIN g/dL 4.0 4.4   TOTAL BILIRUBIN mg/dL 0.49 0.30         Results from last 7 days   Lab Units 06/27/25  0510 06/26/25  0532   GLUCOSE RANDOM mg/dL 125 126           Results from last 7 days   Lab Units 06/26/25  1532 06/26/25  1339 06/26/25  1103   HS TNI 0HR ng/L  --   --  5   HS TNI 2HR ng/L  --  6  --    HSTNI D2 ng/L  --  1  --    HS TNI 4HR ng/L 6  --   --    HSTNI D4 ng/L 1  --   --          Results from last 7 days   Lab Units 06/27/25  0510   PROTIME seconds 14.0   INR  1.03   PTT seconds 27             Results from last 7 days   Lab Units 06/26/25  0516   LACTIC ACID mmol/L 1.7       Results from last 7  days   Lab Units 06/26/25  1103   BNP pg/mL 63       Results from last 7 days   Lab Units 06/26/25  0532   LIPASE u/L 16     Results from last 7 days   Lab Units 06/26/25  0532   CRP mg/L 3.6*     Results from last 7 days   Lab Units 06/26/25  0524   CLARITY UA  Clear   COLOR UA  Colorless   SPEC GRAV UA  1.015   PH UA  7.5   GLUCOSE UA mg/dl Negative   KETONES UA mg/dl Negative   BLOOD UA  Negative   PROTEIN UA mg/dl Negative   NITRITE UA  Negative   BILIRUBIN UA  Negative   UROBILINOGEN UA (BE) mg/dl <2.0   LEUKOCYTES UA  Negative     Past Medical History:  Diagnosis    Crohn's disease (HCC)     Present on Admission:     Crohn's disease of colon with complication (HCC)   Essential (primary) hypertension   Right kidney mass      Admitting Diagnosis:     Rectal bleeding [K62.5]  Abdominal pain [R10.9]  Nausea and vomiting [R11.2]  Renal malignant neoplasm, right (HCC) [C64.1]  Crohn's colitis, without complications (HCC) [K50.10]  Crohn's colitis, other complication (HCC) [K50.118]    Age/Sex: 50 y.o. male    Network Utilization Review Department  ATTENTION: Please call with any questions or concerns to [phone] and carefully listen to the prompts so that you are directed to the right person. All voicemails are confidential.   For Discharge needs, contact Care Management DC Support Team at [phone] opt. 2  Send all requests for admission clinical reviews, approved or denied determinations and any other requests to dedicated fax number below belonging to the campus where the patient is receiving treatment. List of dedicated fax numbers for the Facilities:  FACILITY NAME UR FAX NUMBER   ADMISSION DENIALS (Administrative/Medical Necessity) [phone]   DISCHARGE SUPPORT TEAM (NETWORK) [phone]   PARENT CHILD HEALTH (Maternity/NICU/Pediatrics) [phone]   [facility] [phone]   [facility] [phone]   [facility]  [phone]   [facility] [phone]   [facility] [phone]   [facility] [phone]   [facility] [phone]   [facility] [phone]   [facility] [phone]   [facility] [phone]   [facility] [phone]   [facility] [phone]

## 2025-06-27 NOTE — ASSESSMENT & PLAN NOTE
Hx of small and large bowel Crohn's dz s/p 2 resections most recently in 2012 with IC resection.   Last colon in 11/2023 with left sided and rectal dz.   Failed several medications including: Remicade, Humira, Entyvio, and most recently. Most recently on Rinvoq though self discontinued almost 1 year prior.   Now with symptoms and imaging findings concerning for IBD exacerbation.   Check stool studies now.   Serial abd examinations.   Maintain on solumedrol 20mg TID and when appropriate transition to prednisone.   Send pre-biologic w/u.   Okay for trial of clear liquid diet, with advancement as tolerated from GI standpoint.   DVT ppx for IBD pt.  Anti-emetics and analgesics as needed per primary team. Minimize narcotic analgesia.   Initiated PPI due to complaints of sig heartburn symptoms.   Will need close OP f/u with GI team.

## 2025-06-27 NOTE — ASSESSMENT & PLAN NOTE
Urology consult.   Concerning for malignancy, going to establish as OP to discussion surgical intervention.

## 2025-06-27 NOTE — CASE MANAGEMENT
Case Management Assessment & Discharge Planning Note    Patient name Mohit Farley  Location /427-01 MRN [id number]  : 1975 Date 2025       Current Admission Date: 2025  Current Admission Diagnosis:Crohn's disease of colon with complication (HCC)   Patient Active Problem List    Diagnosis Date Noted    Crohn's disease of colon with complication (HCC) 2023    Essential (primary) hypertension 2023    Osteopenia with high risk of fracture 2023    S/P right hemicolectomy 2021    Right kidney mass 2019    Crohn's disease (HCC) 2012      LOS (days): 0  Geometric Mean LOS (GMLOS) (days):   Days to GMLOS:     OBJECTIVE:              Current admission status: Observation  Referral Reason:  (discharge planning)    [facility] Pharm[facility]:   [facility]  [address] [place] [address], [address]  [facility] [address]  Phone: [phone] Fax: [phone]    Primary Care Provider: Maude Cruz PA-C    Primary Insurance: [place] [employer]  Secondary Insurance:     ASSESSMENT:    CM met with patient at the bedside,baseline information  was obtained. CM discussed the role of CM in helping the patient develop a discharge plan and assist the patient in carry out their plan.    Patient mail address is [place].   Cm asked for his new address local to [facility] and he declined stated leave [place] in chart.    Baseline independent   Active Health Care Proxies    There are no active Health Care Proxies on file.       Advance Directives  Does patient have a Health Care POA?: No  Was patient offered paperwork?: Yes (declined)  Does patient currently have a Health Care decision maker?: Yes, please see Health Care Proxy section  Does patient have Advance Directives?: No  Was patient offered paperwork?: Yes (declined)  Primary Contact: Doreen Farley (Mother)  [phone] (M)         Readmission Root Cause  30 Day Readmission:  No    Patient Information  Admitted from:: Home  Mental Status: Alert  During Assessment patient was accompanied by: Not accompanied during assessment  Assessment information provided by:: Patient  Primary Caregiver: Parent  Caregiver's Name:: Doreen Farley (Mother)  [phone] (M)  Caregiver's Relationship to Patient:: Family Member  Caregiver's Telephone Number:: Doreen Farley (Mother)  [phone] (S)  Support Systems: Self, Parent  County of Residence: Other (specify in comment box) ([facility])  What city do you live in?: [place]  Is patient a ?: No    Activities of Daily Living Prior to Admission  Functional Status: Independent  Completes ADLs independently?: Yes  Ambulates independently?: Yes  Does patient use assisted devices?: No  Does patient currently own DME?: No  Does patient have a history of Outpatient Therapy (PT/OT)?: No  Does the patient have a history of Short-Term Rehab?: No  Does patient have a history of HHC?: No  Does patient currently have HHC?: No         Patient Information Continued  Income Source: Employed  Does patient have prescription coverage?: Yes  Can the patient afford their medications and any related supplies (such as glucometers or test strips)?: Yes  Does patient receive dialysis treatments?: No  Does patient have a history of substance abuse?: No  Does patient have a history of Mental Health Diagnosis?: No    Means of Transportation  Means of Transport to Appts:: Drives Self    DISCHARGE DETAILS:    Discharge planning discussed with:: patient  Freedom of Choice: Yes     CM contacted family/caregiver?: No- see comments (declined)  Were Treatment Team discharge recommendations reviewed with patient/caregiver?: Yes  Did patient/caregiver verbalize understanding of patient care needs?: Yes  Were patient/caregiver advised of the risks associated with not following Treatment Team discharge recommendations?: Yes    Contacts  Patient Contacts: Doreen Farley (Mother)   [phone] (M)  Relationship to Patient:: Family    Requested Home Health Care         Is the patient interested in HHC at discharge?: No    DME Referral Provided  Referral made for DME?: No      Would you like to participate in our [facility] service program?  : No - Declined    Treatment Team Recommendation: Home  Expected Discharge Disposition: Home or Self Care  Additional Discharge Dispositions: Home or Self Care           CM to follow for any discharge needs.

## 2025-06-27 NOTE — ASSESSMENT & PLAN NOTE
-history and presentation consistent with Crohn disease exacerbation. There is no evidence of severe colitis or toxic megacolon  -received solumedrol 60mg IV once in ED  -c/w solumedrol 20mg IV bid  -consulted gastroenterology who will see him on 6/27  -he will have to discuss with outpatient GI clinic about maintenance medication for crohn's disease  -NPO for bowel rest - after GI evaluation, plan to advance to liquid diet  -IVF  -zofran PRN for nausea, vomiting

## 2025-06-27 NOTE — PROGRESS NOTES
Progress Note - Hospitalist   Name: Mohit Farley 50 y.o. male I MRN: [id number]  Unit/Bed#: 427-01 I Date of Admission: 6/26/2025   Date of Service: 6/27/2025 I Hospital Day: 0    Assessment & Plan  Crohn's disease of colon with complication (HCC)  S/P right hemicolectomy  -history and presentation consistent with Crohn disease exacerbation. There is no evidence of severe colitis or toxic megacolon  -received solumedrol 60mg IV once in ED  -c/w solumedrol 20mg IV bid  -consulted gastroenterology who will see him on 6/27  -he will have to discuss with outpatient GI clinic about maintenance medication for crohn's disease  -NPO for bowel rest - after GI evaluation, plan to advance to liquid diet  -IVF  -zofran PRN for nausea, vomiting  Right kidney mass  -CT scan with very concerning finding for renal cell carcinoma  -inpatient urology consultation - who recommend outpatient robotic urology surgery. I discussed with patient and his friend about the importance of following up, high risk of kidney cancer if not promptly addressed  -patient agreeable to follow up  Essential (primary) hypertension  -resume home amlodipine, previously lost to follow up. Dose increased to 10mg daily.     VTE Pharmacologic Prophylaxis:   lovenox    Mobility:   Basic Mobility Inpatient Raw Score: 24  JH-HLM Goal: 8: Walk 250 feet or more  JH-HLM Achieved: 7: Walk 25 feet or more  JH-HLM Goal achieved. Continue to encourage appropriate mobility.    Patient Centered Rounds: I performed bedside rounds with nursing staff today.   Discussions with Specialists or Other Care Team Provider: GI    Education and Discussions with Family / Patient: Patient declined call to .     Current Length of Stay: 0 day(s)  Current Patient Status: Observation   Certification Statement: The patient will continue to require additional inpatient hospital stay due to IV steroid, GI eval  Discharge Plan: TBD to home    Code Status: Level 1 - Full  Code    Subjective   Patient states less abdominal distension, less abdominal pain. No nausea, vomiting. No stool yet. No diarrhea. Denies dizziness. Would like to try a liquid diet.     Objective :  Temp:  [97.6 °F (36.4 °C)-98.3 °F (36.8 °C)] 97.6 °F (36.4 °C)  HR:  [70-89] 82  BP: (139-177)/() 139/88  Resp:  [16-18] 17  SpO2:  [94 %-97 %] 97 %  O2 Device: None (Room air)    Body mass index is 27.22 kg/m².     Input and Output Summary (last 24 hours):     Intake/Output Summary (Last 24 hours) at 6/27/2025 0988  Last data filed at 6/26/2025 1715  Gross per 24 hour   Intake 0 ml   Output --   Net 0 ml       Physical Exam  Vitals and nursing note reviewed.   Constitutional:       General: He is not in acute distress.     Appearance: He is well-developed.   HENT:      Head: Normocephalic and atraumatic.     Eyes:      Conjunctiva/sclera: Conjunctivae normal.       Cardiovascular:      Rate and Rhythm: Normal rate and regular rhythm.      Heart sounds: No murmur heard.  Pulmonary:      Effort: Pulmonary effort is normal. No respiratory distress.      Breath sounds: Normal breath sounds.   Abdominal:      General: There is no distension.      Palpations: Abdomen is soft.      Tenderness: There is no abdominal tenderness. There is no guarding or rebound.      Hernia: No hernia is present.     Musculoskeletal:         General: No swelling.      Cervical back: Neck supple.     Skin:     General: Skin is warm and dry.      Capillary Refill: Capillary refill takes less than 2 seconds.     Neurological:      Mental Status: He is alert.     Psychiatric:         Mood and Affect: Mood normal.           Lines/Drains:              Lab Results: I have reviewed the following results:   Results from last 7 days   Lab Units 06/27/25  0510   WBC Thousand/uL 11.31*   HEMOGLOBIN g/dL 13.7   HEMATOCRIT % 43.1   PLATELETS Thousands/uL 278   SEGS PCT % 89*   LYMPHO PCT % 7*   MONO PCT % 3*   EOS PCT % 0     Results from last 7 days    Lab Units 06/27/25  0510   SODIUM mmol/L 140   POTASSIUM mmol/L 4.0   CHLORIDE mmol/L 105   CO2 mmol/L 26   BUN mg/dL 16   CREATININE mg/dL 0.88   ANION GAP mmol/L 9   CALCIUM mg/dL 8.7   ALBUMIN g/dL 4.0   TOTAL BILIRUBIN mg/dL 0.49   ALK PHOS U/L 94   ALT U/L 24   AST U/L 9*   GLUCOSE RANDOM mg/dL 125     Results from last 7 days   Lab Units 06/27/25  0510   INR  1.03             Results from last 7 days   Lab Units 06/26/25  0516   LACTIC ACID mmol/L 1.7       Recent Cultures (last 7 days):               Last 24 Hours Medication List:     Current Facility-Administered Medications:     amLODIPine (NORVASC) tablet 10 mg, Daily    enoxaparin (LOVENOX) subcutaneous injection 40 mg, Q24H JUANCHO    methylPREDNISolone sodium succinate (Solu-MEDROL) injection 20 mg, Q12H JUANCHO    multi-electrolyte (ISOLYTE-S PH 7.4 equivalent) IV solution, Continuous, Last Rate: 125 mL/hr (06/27/25 0505)    ondansetron (ZOFRAN) injection 4 mg, Q6H PRN    Administrative Statements   Today, Patient Was Seen By: Jenny Giron MD      **Please Note: This note may have been constructed using a voice recognition system.**

## 2025-06-27 NOTE — CONSULTS
Consultation - Gastroenterology   Name: Mohit Farley 50 y.o. male I MRN: [id number]  Unit/Bed#: 427-01 I Date of Admission: 6/26/2025   Date of Service: 6/27/2025 I Hospital Day: 0   Inpatient consult to gastroenterology  Consult performed by: Jenn Mercer PA-C  Consult ordered by: Jose Daniel Mendenhall MD      Physician Requesting Evaluation: Jose Daniel Mendenhall MD   Reason for Evaluation / Principal Problem: abd pain, rectal bleeding, crohn's colitis     Assessment & Plan  Crohn's disease of colon with complication (HCC)  S/P right hemicolectomy  Hx of small and large bowel Crohn's dz s/p 2 resections most recently in 2012 with IC resection.   Last colon in 11/2023 with left sided and rectal dz.   Failed several medications including: Remicade, Humira, Entyvio, and most recently. Most recently on Rinvoq though self discontinued almost 1 year prior.   Now with symptoms and imaging findings concerning for IBD exacerbation.   Check stool studies now.   Serial abd examinations.   Maintain on solumedrol 20mg TID and when appropriate transition to prednisone.   Send pre-biologic w/u.   Okay for trial of clear liquid diet, with advancement as tolerated from GI standpoint.   DVT ppx for IBD pt.  Anti-emetics and analgesics as needed per primary team. Minimize narcotic analgesia.   Initiated PPI due to complaints of sig heartburn symptoms.   Will need close OP f/u with GI team.  Right kidney mass  Urology consult.   Concerning for malignancy, going to establish as OP to discussion surgical intervention.   I have discussed the above management plan in detail with the primary service.   Gastroenterology service will follow.    History of Present Illness   HPI:  Mohit Farley is a 50 y.o. male who presents for abd pain. Pmhx sig for Crohn's disease s/p status post 2 surgeries including most recent ileocolic resection in 2012, previously on multiple medications (Remicade, Humira, Entyvio, most recently Rinvoq which he  "self-discontinued about 1 year ago).    Pt was dealing with several days of generalized abd pain, nausea, and ultimately episodes of emesis (one episode with specks of red). No hematemesis. Stools had been more challenging to pass, though were coming out in small thin ribbon like strands. No BRBPR or melena. No fever. No obvious precipitants he can recall. D/c Rinvoq about a year ago as he was unable to make it to his GI appts. Intermittently over the past year would take a few days of prednisone for suspected \"inflammation\". At baseline was having formed stools just about daily. No mouth ulcers, recurrent eye infections, recurrent joint erythema or recurrent rashes. No weight loss over past 6 months, dealing with weight gain. No NSAIDs, etoh, tobacco.     Pt presented to hospital on 06/26/25 with abd pain, nausea, and emesis (one episode with specks of blood). CT at time of admission with colitis, right kidney masses. Serologies at time of hospitalization with WBC 7.68, Hb 15.9, MCV 79, Plt 275, BUN 14, Cr 0.89, AST 13, ALT 31, , albumin 4.4, t bili 0.30, lactate 1.7, CRP 3.6. stool studies ordered though not collected.     Endoscopic history:   Colon: 11/2023: The terminal ileum is normal. Patent end-to-side ileo-colonic anastomosis at 40 cm from anus, characterized by healthy appearing mucosa.   The area from 30 to 40 cm proximal to the anus is normal. Biopsies taken at 30 cm. Stricture at 25 cm proximal to the anus. Pseudopolyp/ granulation tissue at 25 cm proximal to the anus.  Congested, erythematous and friable (with contact bleeding) mucosa from 15 to 30 cm proximal to the anus. Biopsied at 20 cm. Congested, erythematous, eroded, inflamed and ulcerated mucosa in the rectum.   A. Ileum, Terminal, biopsy: Ileal mucosa without significant active inflammation or dysplasia.   B. Colon, @ 30 cm, biopsy:Colonic mucosa without significant active inflammation or dysplasia.   C. Colon, Polyp at 25 cm, " polypectomy: Inflammatory polyp with prominent granulation tissue. Immunostain for CMV is negative  D. Colon, Stricture @ 25 cm, biopsy: Mild chronic inactive colitis with submucosal fibrosis. Negative for dysplasia. No evidence of malignancy.   Detached fragments of granulation tissue present suggestive of a nearby ulcer.  E. Colon,  @ 20 cm, biopsy: Mild chronic inactive colitis; Negative for dysplasia.  F. Rectum, biopsy: Severe chronic colitis, severely active; Negative for dysplasia.Immunostain for CMV is negative.    Review of Systems  Per HPI    Historical Information   Past Medical History[1]  Past Surgical History[2]  Social History[3]  E-Cigarette/Vaping    E-Cigarette Use Never User      E-Cigarette/Vaping Substances     Family History[4]    Objective :  Temp:  [97.6 °F (36.4 °C)-98.3 °F (36.8 °C)] 97.6 °F (36.4 °C)  HR:  [70-89] 82  BP: (139-177)/() 139/88  Resp:  [16-18] 17  SpO2:  [94 %-97 %] 97 %  O2 Device: None (Room air)    Physical Exam  Vitals and nursing note reviewed.   Constitutional:       General: He is not in acute distress.     Appearance: He is well-developed.   HENT:      Head: Normocephalic and atraumatic.     Eyes:      General: No scleral icterus.     Conjunctiva/sclera: Conjunctivae normal.       Cardiovascular:      Rate and Rhythm: Normal rate.   Pulmonary:      Effort: Pulmonary effort is normal. No respiratory distress.   Abdominal:      General: There is no distension.      Palpations: Abdomen is soft.      Tenderness: There is no abdominal tenderness. There is no guarding or rebound.     Skin:     General: Skin is warm and dry.      Coloration: Skin is not jaundiced.     Neurological:      General: No focal deficit present.      Mental Status: He is alert.     Psychiatric:         Mood and Affect: Mood normal.         Behavior: Behavior normal.         Lab Results: I have reviewed the following results:CBC/BMP:   .     06/27/25  0510   WBC 11.31*   HGB 13.7   HCT 43.1       SODIUM 140   K 4.0      CO2 26   BUN 16   CREATININE 0.88   GLUC 125   MG 2.3   PHOS 3.6    , Creatinine Clearance: Estimated Creatinine Clearance: 113.5 mL/min (by C-G formula based on SCr of 0.88 mg/dL)., LFTs:   .     06/27/25  0510   AST 9*   ALT 24   ALB 4.0   TBILI 0.49   ALKPHOS 94        Imaging Results Review: I reviewed radiology reports from this admission including: CT abdomen/pelvis.  Other Study Results Review: Pathology reports reviewed.    **Please note:  Dictation voice to text software may have been used in the creation of this record.  Occasional wrong word or “sound alike” substitutions may have occurred due to the inherent limitations of voice recognition software.  Read the chart carefully and recognize, using context, where substitutions have occurred.**       [1]   Past Medical History:  Diagnosis Date    Crohn's disease (HCC)    [2]   Past Surgical History:  Procedure Laterality Date    COLECTOMY      right hemicolectomy? 2012   [3]   Social History  Tobacco Use    Smoking status: Never    Smokeless tobacco: Never   Vaping Use    Vaping status: Never Used   Substance and Sexual Activity    Alcohol use: Never    Drug use: Not Currently   [4] No family history on file.

## 2025-06-28 LAB
ANION GAP SERPL CALCULATED.3IONS-SCNC: 11 MMOL/L (ref 4–13)
ANISOCYTOSIS BLD QL SMEAR: PRESENT
BASOPHILS # BLD AUTO: 0.02 THOUSANDS/ÂΜL (ref 0–0.1)
BASOPHILS NFR BLD AUTO: 0 % (ref 0–1)
BUN SERPL-MCNC: 14 MG/DL (ref 5–25)
CALCIUM SERPL-MCNC: 8.7 MG/DL (ref 8.4–10.2)
CHLORIDE SERPL-SCNC: 103 MMOL/L (ref 96–108)
CO2 SERPL-SCNC: 24 MMOL/L (ref 21–32)
CREAT SERPL-MCNC: 0.82 MG/DL (ref 0.6–1.3)
EOSINOPHIL # BLD AUTO: 0 THOUSAND/ÂΜL (ref 0–0.61)
EOSINOPHIL NFR BLD AUTO: 0 % (ref 0–6)
ERYTHROCYTE [DISTWIDTH] IN BLOOD BY AUTOMATED COUNT: 14.1 % (ref 11.6–15.1)
GAMMA INTERFERON BACKGROUND BLD IA-ACNC: 0.01 IU/ML
GFR SERPL CREATININE-BSD FRML MDRD: 103 ML/MIN/1.73SQ M
GLUCOSE P FAST SERPL-MCNC: 139 MG/DL (ref 65–99)
GLUCOSE SERPL-MCNC: 139 MG/DL (ref 65–140)
HBV CORE AB SER QL: NORMAL
HCT VFR BLD AUTO: 44.5 % (ref 36.5–49.3)
HCV AB SER QL: NORMAL
HGB BLD-MCNC: 14.3 G/DL (ref 12–17)
IMM GRANULOCYTES # BLD AUTO: 0.11 THOUSAND/UL (ref 0–0.2)
IMM GRANULOCYTES NFR BLD AUTO: 1 % (ref 0–2)
LYMPHOCYTES # BLD AUTO: 0.67 THOUSANDS/ÂΜL (ref 0.6–4.47)
LYMPHOCYTES NFR BLD AUTO: 5 % (ref 14–44)
M TB IFN-G BLD-IMP: NEGATIVE
M TB IFN-G CD4+ BCKGRND COR BLD-ACNC: 0 IU/ML
M TB IFN-G CD4+ BCKGRND COR BLD-ACNC: 0 IU/ML
MCH RBC QN AUTO: 24.9 PG (ref 26.8–34.3)
MCHC RBC AUTO-ENTMCNC: 32.1 G/DL (ref 31.4–37.4)
MCV RBC AUTO: 78 FL (ref 82–98)
MITOGEN IGNF BCKGRD COR BLD-ACNC: 0.62 IU/ML
MONOCYTES # BLD AUTO: 0.19 THOUSAND/ÂΜL (ref 0.17–1.22)
MONOCYTES NFR BLD AUTO: 2 % (ref 4–12)
NEUTROPHILS # BLD AUTO: 11.43 THOUSANDS/ÂΜL (ref 1.85–7.62)
NEUTS SEG NFR BLD AUTO: 92 % (ref 43–75)
NRBC BLD AUTO-RTO: 0 /100 WBCS
PLATELET # BLD AUTO: 288 THOUSANDS/UL (ref 149–390)
PLATELET BLD QL SMEAR: ADEQUATE
PMV BLD AUTO: 9.7 FL (ref 8.9–12.7)
POTASSIUM SERPL-SCNC: 4 MMOL/L (ref 3.5–5.3)
RBC # BLD AUTO: 5.74 MILLION/UL (ref 3.88–5.62)
RBC MORPH BLD: PRESENT
SODIUM SERPL-SCNC: 138 MMOL/L (ref 135–147)
WBC # BLD AUTO: 12.42 THOUSAND/UL (ref 4.31–10.16)

## 2025-06-28 PROCEDURE — 87505 NFCT AGENT DETECTION GI: CPT | Performed by: FAMILY MEDICINE

## 2025-06-28 PROCEDURE — 83993 ASSAY FOR CALPROTECTIN FECAL: CPT | Performed by: STUDENT IN AN ORGANIZED HEALTH CARE EDUCATION/TRAINING PROGRAM

## 2025-06-28 PROCEDURE — 86706 HEP B SURFACE ANTIBODY: CPT | Performed by: PHYSICIAN ASSISTANT

## 2025-06-28 PROCEDURE — 99232 SBSQ HOSP IP/OBS MODERATE 35: CPT | Performed by: FAMILY MEDICINE

## 2025-06-28 PROCEDURE — 80048 BASIC METABOLIC PNL TOTAL CA: CPT | Performed by: STUDENT IN AN ORGANIZED HEALTH CARE EDUCATION/TRAINING PROGRAM

## 2025-06-28 PROCEDURE — 87340 HEPATITIS B SURFACE AG IA: CPT | Performed by: PHYSICIAN ASSISTANT

## 2025-06-28 PROCEDURE — 85025 COMPLETE CBC W/AUTO DIFF WBC: CPT | Performed by: STUDENT IN AN ORGANIZED HEALTH CARE EDUCATION/TRAINING PROGRAM

## 2025-06-28 RX ADMIN — PANTOPRAZOLE SODIUM 40 MG: 40 TABLET, DELAYED RELEASE ORAL at 05:50

## 2025-06-28 RX ADMIN — ENOXAPARIN SODIUM 40 MG: 40 INJECTION SUBCUTANEOUS at 08:17

## 2025-06-28 RX ADMIN — OXYCODONE HYDROCHLORIDE 5 MG: 5 TABLET ORAL at 18:21

## 2025-06-28 RX ADMIN — METHYLPREDNISOLONE SODIUM SUCCINATE 20 MG: 40 INJECTION, POWDER, FOR SOLUTION INTRAMUSCULAR; INTRAVENOUS at 14:01

## 2025-06-28 RX ADMIN — METHYLPREDNISOLONE SODIUM SUCCINATE 20 MG: 40 INJECTION, POWDER, FOR SOLUTION INTRAMUSCULAR; INTRAVENOUS at 05:50

## 2025-06-28 RX ADMIN — OXYCODONE HYDROCHLORIDE 5 MG: 5 TABLET ORAL at 14:01

## 2025-06-28 RX ADMIN — OXYCODONE HYDROCHLORIDE 5 MG: 5 TABLET ORAL at 22:14

## 2025-06-28 RX ADMIN — SODIUM CHLORIDE, SODIUM GLUCONATE, SODIUM ACETATE, POTASSIUM CHLORIDE, MAGNESIUM CHLORIDE, SODIUM PHOSPHATE, DIBASIC, AND POTASSIUM PHOSPHATE 125 ML/HR: .53; .5; .37; .037; .03; .012; .00082 INJECTION, SOLUTION INTRAVENOUS at 07:16

## 2025-06-28 RX ADMIN — OXYCODONE HYDROCHLORIDE 5 MG: 5 TABLET ORAL at 03:24

## 2025-06-28 RX ADMIN — AMLODIPINE BESYLATE 10 MG: 10 TABLET ORAL at 08:17

## 2025-06-28 RX ADMIN — METHYLPREDNISOLONE SODIUM SUCCINATE 20 MG: 40 INJECTION, POWDER, FOR SOLUTION INTRAMUSCULAR; INTRAVENOUS at 22:15

## 2025-06-28 NOTE — UTILIZATION REVIEW
"*PLEASE SEE INITIAL REVIEW COMPLETED 6/27/25 BY VIOLETTA MCCRACKEN RN    Continued Stay Review/ OBSERVATION TO INPATIENT   Date: 6/28/25                        Current Patient Class: OBSERVATION 6/26/25 AT 1103 - CONVERTED TO INPATIENT 6/28/25 AT 1447 2ND ACUTE EXACERBATION STRICTURING + PENETRATING CROHN'S DISEASE REQUIRING CONTINUED INPATIENT MANAGEMENT > 51 HOURS WITH INCREASED IV STEROIDS, GI CONSULT WITH PROBABLE BIOLOGIC TX, PPI, ANALGESICS PRN    Current Level of Care: Med Surg    06/28/25 1448  INPATIENT ADMISSION  Once        Transfer Service: Hospitalist   Question Answer Comment   Level of Care Med Surg    Estimated length of stay More than 2 Midnights    Certification I certify that inpatient services are medically necessary for this patient for a duration of greater than two midnights. See H&P and MD Progress Notes for additional information about the patient's course of treatment.        06/28/25 1447   06/26/25 1104  Place in Observation  Once        Transfer Service: Hospitalist   Question: Level of Care Answer: Med Surg    06/26/25 1103       HPI:  49 y/o male with PMHx Crohn's, hemicolectomy x 2 - initially presented to [facility] ED + placed in Observation on 6/26/25 2nd Chron's disease of colon, right renal mass enlargement.    Initial Tx plan included IVFF, IV solumedrol, GI + Urology consult      6/28/25 OBSERVATION TO INPATIENT:  MD Certification Statement: Requires additional inpatient hospital stay due to Crohns   Per MD \"feels a little worse today\"  More distended, passing flatus, no bm , some cramping overnight     Crohn's disease of colon with complication (HCC)  S/P right hemicolectomy  -history and presentation consistent with Crohn disease exacerbation. There is no evidence of severe colitis or toxic megacolon  -received solumedrol 60mg IV once in ED - Increased solumedrol to 20mg IV TID   Advanced from NPO to clear liquids, plan advance to lite meal     CONVERTED TO INPATIENT 6/28/25 AT " 1447 2ND ACUTE EXACERBATION STRICTURING + PENETRATING CROHN'S DISEASE REQUIRING CONTINUED INPATIENT MANAGEMENT > 51 HOURS WITH INCREASED IV STEROIDS, GI CONSULT WITH PROBABLE BIOLOGIC TX, PPI, ANALGESICS PRN    6/29/25 INPATIENT DAY 2:  INT MED DISCHARGE SUMMARY NOTE:  Hospital Course:   50-year-old male with history of stricturing and penetrating Crohn's disease status post resection and right hemicolectomy x2 (most recently 2012 with IC resection) currently not on any therapy - admitted on 6/26 for Crohn's disease with acute exacerbation. Treated with solumdrol IV, evaluated by gastroenterology, obtained pre-biologic workup in hospital. Patient regained bowel function and advanced diet to full. At time of discharge, he was sent home with prednisone gradual taper every 2 weeks starting at 40 mg (40 mg, 30 mg, 20 mg, 10 mg, 5 mg). Recommend close GI followup for maintenance medication. See GI consult on 6/27/25.    Secondarily, there was incidental finding on CT scan very concerning finding for renal cell carcinoma. This finding is worse compared to prior scans. He spoke with inpatient urology consultation, who recommend outpatient robotic urology surgery. Information provided at discharge.     Crohn's disease of colon with complication (HCC)  S/P right hemicolectomy  50-year-old male with history of stricturing and penetrating Crohn's disease status post resection and right hemicolectomy x2 (most recently 2012 with IC resection) currently not on any therapy due to lost to follow up - admitted on 6/26 for Crohn's disease with acute exacerbation  Treated with solumdrol IV while admitted  Evaluated by gastroenterology - see detailed note from 6/27/25  obtained pre-biologic workup in hospital  Patient regained bowel function and advanced diet to baseline  At time of discharge, he was sent home with prednisone gradual taper every 2 weeks starting at 40 mg (40 mg, 30 mg, 20 mg, 10 mg, 5 mg)  Recommend close GI followup  for maintenance medication, likely need biologics    *DISCHARGED 6/29/25 AT 1157    Diet Surgical Soft/ Lite Meal    Scheduled Medications:  amLODIPine, 10 mg, Oral, Daily  enoxaparin, 40 mg, Subcutaneous, Q24H JUANCHO  methylPREDNISolone sodium succinate, 20 mg, Intravenous, Q8H JUANCHO  pantoprazole, 40 mg, Oral, Early Morning    Continuous IV Infusions: NONE    PRN Meds:  acetaminophen, 650 mg, Oral, Q6H PRN- 6/27 X 1  ondansetron, 4 mg, Intravenous, Q6H PRN  oxyCODONE, 5 mg, Oral, Q4H PRN - 6/27 X 2, 6/28 X 4    Discharge Plan:   To be determined   Inpatient Case Management following for all discharge needs    Vital Signs:   Date/Time Temp Pulse Resp BP MAP (mmHg) SpO2 O2 Device Patient Position - Orthostatic VS   06/29/25 07:46:09 98.4 °F (36.9 °C) 64 20 146/79 101 94 % None (Room air) Sitting   06/28/25 23:45:15 98 °F (36.7 °C) 69 20 123/95 104 -- -- Sitting   06/28/25 1915 -- -- -- -- -- -- None (Room air) --   06/28/25 15:53:56 97.9 °F (36.6 °C) 69 20 145/88 107 97 % None (Room air) Sitting      Date/Time Temp Pulse Resp BP MAP (mmHg) SpO2 O2 Device Patient Position - Orthostatic VS [place] Coma Scale Score Pain    06/28/25 0955 -- -- -- -- -- -- -- -- -- No Pain    06/28/25 07:04:17 97.6 °F (36.4 °C) 55 18 145/83 104 96 % None (Room air) Lying -- --    06/28/25 0324 -- -- -- -- -- -- -- -- -- 6    06/27/25 2314 -- -- -- -- -- -- -- -- -- 5    06/27/25 21:50:54 97.9 °F (36.6 °C) 78 20 138/90 106 96 % -- -- -- --    06/27/25 21:12:03 -- 83 16 130/85 100 93 % -- -- -- --    06/27/25 2104 -- -- -- -- -- -- -- -- -- 3    06/27/25 2100 -- -- -- -- -- -- -- -- 15 --    06/27/25 2057 -- -- -- -- -- -- None (Room air) -- 15 3    06/27/25 1844 -- -- -- -- -- -- -- -- -- 5    06/27/25 15:27:51 97.7 °F (36.5 °C) 79 16 140/86 104 95 % -- -- -- --    06/27/25 0910 -- -- -- -- -- -- -- -- -- 2    06/27/25 08:39:45 97.6 °F (36.4 °C) 82 17 139/88 105 97 % -- -- -- --    06/26/25 23:45:44 98.3 °F (36.8 °C) 89 -- 150/80 103 96 % --  -- -- --    06/26/25 2341 -- 89 -- -- -- 95 % -- -- -- --    06/26/25 2307 -- -- -- -- -- -- -- -- -- 4 06/26/25 2027 -- -- -- -- -- 94 % None (Room air) -- -- No Pain    06/26/25 1200 -- -- -- -- -- -- -- -- -- No Pain    06/26/25 11:49:23 98.2 °F (36.8 °C) 70 18 154/95 115 97 % None (Room air) Sitting -- --    06/26/25 1104 -- -- -- -- -- -- -- -- -- 5 06/26/25 1100 98.3 °F (36.8 °C) 76 16 177/105 134 95 % None (Room air) Sitting -- 5 06/26/25 0900 98.3 °F (36.8 °C) 79 18 179/100 130 96 % None (Room air) Sitting -- 2 06/26/25 0757 -- -- -- -- -- -- None (Room air) -- -- --    06/26/25 0756 -- -- -- -- -- -- -- -- 15 --    06/26/25 0745 98.2 °F (36.8 °C) 78 18 162/97 124 97 % None (Room air) Sitting -- 2 06/26/25 0741 -- -- -- 162/97 -- -- -- -- -- --    06/26/25 0658 98.2 °F (36.8 °C) 76 18 176/112 -- 96 % None (Room air) Sitting -- 2 06/26/25 0600 98.4 °F (36.9 °C) 81 18 169/104 130 95 % None (Room air) Sitting -- 2 06/26/25 0530 97.5 °F (36.4 °C) 75 -- 179/101 133 92 % None (Room air) Sitting -- 4 06/26/25 0518 -- -- -- -- -- -- -- -- -- 6    06/26/25 0502 -- -- -- -- -- -- None (Room air) -- -- --    06/26/25 0457 -- -- -- -- -- 99 % -- -- -- --    06/26/25 0455 97.5 °F (36.4 °C) 79 18 203/109 -- 99 % None (Room air) -- -- 6     Weight (last 2 days)       Date/Time Weight    06/26/25 11:49:23 93.6 (206.35)    06/26/25 0455 90.7 (200)     I/O 06/27  0701 06/28  0701    Intake   (mL/kg) 820   (8.8) 360   (3.8)    P.O. 820 360    Net +820 +360    Unmeasured Urine Occurrence 5  x --      Pertinent Labs/Diagnostic Results:   XR chest 1 view portable   Final Interpretation by Belén Rouse MD (06/26 2149)      No acute cardiopulmonary disease.      CT abdomen pelvis with contrast   Final Interpretation by Radha Vogel MD (06/26 5906)      Colitis, as described above. Please see discussion. This could be infectious or inflammatory and may possibly be related to the patient's  history of Crohn's disease. Clinical correlation and follow-up recommended. Gastroenterology consultation    recommended.      There is an approximately 4.5 cm heterogeneously enhancing mass involving the medial aspect of the lower pole right kidney and the inferior aspect of the interpolar region right kidney, larger compared with December 5, 2023. The appearance of this mass    is consistent with malignant neoplasm. Urology consultation and follow-up recommended.      There is an approximately 2.2 cm hypodense lesion arising from the medial aspect of the upper pole right kidney which is indeterminate. Follow-up is required. Renal protocol CT or MRI is recommended for further characterization, if there are no    contraindications.      Splenomegaly.      Mild hepatic steatosis.     Cardiology:  ECG 12 lead   Final Result by Nafisa Matute MD (06/26 0704)   Normal sinus rhythm   Normal ECG   Confirmed by Nafisa Matute ([id number]) on 6/26/2025 4:32:37 PM          Results from last 7 days   Lab Units 06/28/25 0419 06/27/25  0510 06/26/25  0516   WBC Thousand/uL 12.42* 11.31* 7.68   HEMOGLOBIN g/dL 14.3 13.7 15.9   HEMATOCRIT % 44.5 43.1 50.6*   PLATELETS Thousands/uL 288 278 275   TOTAL NEUT ABS Thousands/µL 11.43* 10.09* 6.26     Results from last 7 days   Lab Units 06/28/25 0419 06/27/25  0510 06/26/25  0532   SODIUM mmol/L 138 140 139   POTASSIUM mmol/L 4.0 4.0 4.2   CHLORIDE mmol/L 103 105 106   CO2 mmol/L 24 26 25   ANION GAP mmol/L 11 9 8   BUN mg/dL 14 16 14   CREATININE mg/dL 0.82 0.88 0.89   EGFR ml/min/1.73sq m 103 100 99   CALCIUM mg/dL 8.7 8.7 9.4   MAGNESIUM mg/dL  --  2.3  --    PHOSPHORUS mg/dL  --  3.6  --      Results from last 7 days   Lab Units 06/27/25  0510 06/26/25  0532   AST U/L 9* 13   ALT U/L 24 31   ALK PHOS U/L 94 109*   TOTAL PROTEIN g/dL 6.1* 6.9   ALBUMIN g/dL 4.0 4.4   TOTAL BILIRUBIN mg/dL 0.49 0.30     Results from last 7 days   Lab Units 06/28/25 0419 06/27/25  0510 06/26/25  0532    GLUCOSE RANDOM mg/dL 139 125 126       Results from last 7 days   Lab Units 06/27/25  0510   PROTIME seconds 14.0   INR  1.03   PTT seconds 27         Results from last 7 days   Lab Units 06/26/25  0524   CLARITY UA  Clear   COLOR UA  Colorless   SPEC GRAV UA  1.015   PH UA  7.5   GLUCOSE UA mg/dl Negative   KETONES UA mg/dl Negative   BLOOD UA  Negative   PROTEIN UA mg/dl Negative   NITRITE UA  Negative   BILIRUBIN UA  Negative   UROBILINOGEN UA (BE) mg/dl <2.0   LEUKOCYTES UA  Negative     Network Utilization Review Department  ATTENTION: Please call with any questions or concerns to [phone] and carefully listen to the prompts so that you are directed to the right person. All voicemails are confidential.   For Discharge needs, contact Care Management DC Support Team at [phone] opt. 2  Send all requests for admission clinical reviews, approved or denied determinations and any other requests to dedicated fax number below belonging to the campus where the patient is receiving treatment. List of dedicated fax numbers for the Facilities:  FACILITY NAME UR FAX NUMBER   ADMISSION DENIALS (Administrative/Medical Necessity) [phone]   DISCHARGE SUPPORT TEAM (NETWORK) [phone]   PARENT CHILD HEALTH (Maternity/NICU/Pediatrics) [phone]   [facility] [phone]   [facility] [phone]   [facility] [phone]   [facility] [phone]   [facility] [phone]   [facility] [phone]   [facility] [phone]   [facility] [phone]   [facility] [phone]   [facility] [phone]   [facility] [phone]   [facility] [phone]

## 2025-06-28 NOTE — PROGRESS NOTES
Progress Note - Hospitalist   Name: Mohit Farley 50 y.o. male I MRN: [id number]  Unit/Bed#: 427-01 I Date of Admission: 6/26/2025   Date of Service: 6/28/2025 I Hospital Day: 0    Assessment & Plan  Crohn's disease of colon with complication (HCC)  S/P right hemicolectomy  -history and presentation consistent with Crohn disease exacerbation. There is no evidence of severe colitis or toxic megacolon  -received solumedrol 60mg IV once in ED  Increased solumedrol to 20mg IV TID   Advanced from NPO to clear liquids, will advance to lite meal   Right kidney mass  -CT scan with very concerning finding for renal cell carcinoma  -inpatient urology consultation - who recommend outpatient robotic urology surgery. I discussed with patient and his friend about the importance of following up, high risk of kidney cancer if not promptly addressed  -patient agreeable to follow up  Essential (primary) hypertension  -resume home amlodipine, previously lost to follow up. Dose increased to 10mg daily.     VTE Pharmacologic Prophylaxis:   Moderate Risk (Score 3-4) - Pharmacological DVT Prophylaxis Ordered: enoxaparin (Lovenox).    Mobility:   Basic Mobility Inpatient Raw Score: 24  JH-HLM Goal: 8: Walk 250 feet or more  JH-HLM Achieved: 7: Walk 25 feet or more  JH-HLM Goal achieved. Continue to encourage appropriate mobility.    Patient Centered Rounds: I performed bedside rounds with nursing staff today.   Discussions with Specialists or Other Care Team Provider:     Education and Discussions with Family / Patient:     Current Length of Stay: 0 day(s)  Current Patient Status: Observation   Certification Statement: The patient will continue to require additional inpatient hospital stay due to Crohns   Discharge Plan: Anticipate discharge tomorrow to home.    Code Status: Level 1 - Full Code    Subjective   Seen and examined, he feels a little worst today  More distended, passing flatus, no bm , some cramping overnight     Objective  :  Temp:  [97.6 °F (36.4 °C)-97.9 °F (36.6 °C)] 97.6 °F (36.4 °C)  HR:  [55-83] 55  BP: (130-145)/(83-90) 145/83  Resp:  [16-20] 18  SpO2:  [93 %-97 %] 96 %  O2 Device: None (Room air)    Body mass index is 27.22 kg/m².     Input and Output Summary (last 24 hours):     Intake/Output Summary (Last 24 hours) at 6/28/2025 0746  Last data filed at 6/27/2025 1700  Gross per 24 hour   Intake 820 ml   Output --   Net 820 ml       Physical Exam  Constitutional:       General: He is not in acute distress.     Appearance: He is not ill-appearing.     Cardiovascular:      Rate and Rhythm: Normal rate and regular rhythm.      Pulses: Normal pulses.   Pulmonary:      Effort: Pulmonary effort is normal. No respiratory distress.      Breath sounds: No wheezing or rales.   Abdominal:      General: Abdomen is flat. There is distension.      Palpations: Abdomen is soft.      Tenderness: There is abdominal tenderness. There is no right CVA tenderness, left CVA tenderness, guarding or rebound.      Comments: Slightly more distended than yesterday, tenderness is a little better       Musculoskeletal:      Right lower leg: No edema.      Left lower leg: No edema.     Skin:     Capillary Refill: Capillary refill takes 2 to 3 seconds.     Neurological:      General: No focal deficit present.      Mental Status: He is alert and oriented to person, place, and time.           Lines/Drains:              Lab Results: I have reviewed the following results:   Results from last 7 days   Lab Units 06/28/25  0419   WBC Thousand/uL 12.42*   HEMOGLOBIN g/dL 14.3   HEMATOCRIT % 44.5   PLATELETS Thousands/uL 288   SEGS PCT % 92*   LYMPHO PCT % 5*   MONO PCT % 2*   EOS PCT % 0     Results from last 7 days   Lab Units 06/28/25  0419 06/27/25  0510   SODIUM mmol/L 138 140   POTASSIUM mmol/L 4.0 4.0   CHLORIDE mmol/L 103 105   CO2 mmol/L 24 26   BUN mg/dL 14 16   CREATININE mg/dL 0.82 0.88   ANION GAP mmol/L 11 9   CALCIUM mg/dL 8.7 8.7   ALBUMIN g/dL  --   4.0   TOTAL BILIRUBIN mg/dL  --  0.49   ALK PHOS U/L  --  94   ALT U/L  --  24   AST U/L  --  9*   GLUCOSE RANDOM mg/dL 139 125     Results from last 7 days   Lab Units 06/27/25  0510   INR  1.03             Results from last 7 days   Lab Units 06/26/25  0516   LACTIC ACID mmol/L 1.7       Recent Cultures (last 7 days):         Imaging Results Review: I reviewed radiology reports from this admission including: CT abdomen/pelvis.  Other Study Results Review: No additional pertinent studies reviewed.    Last 24 Hours Medication List:     Current Facility-Administered Medications:     acetaminophen (TYLENOL) tablet 650 mg, Q6H PRN    amLODIPine (NORVASC) tablet 10 mg, Daily    enoxaparin (LOVENOX) subcutaneous injection 40 mg, Q24H JUANCHO    methylPREDNISolone sodium succinate (Solu-MEDROL) injection 20 mg, Q8H JUANCHO    multi-electrolyte (ISOLYTE-S PH 7.4 equivalent) IV solution, Continuous, Last Rate: 125 mL/hr (06/28/25 0716)    ondansetron (ZOFRAN) injection 4 mg, Q6H PRN    oxyCODONE (ROXICODONE) IR tablet 5 mg, Q4H PRN    pantoprazole (PROTONIX) EC tablet 40 mg, Early Morning    Administrative Statements   Today, Patient Was Seen By: Jose Daniel Mendenhall MD      **Please Note: This note may have been constructed using a voice recognition system.**

## 2025-06-28 NOTE — ASSESSMENT & PLAN NOTE
-history and presentation consistent with Crohn disease exacerbation. There is no evidence of severe colitis or toxic megacolon  -received solumedrol 60mg IV once in ED  Increased solumedrol to 20mg IV TID   Advanced from NPO to clear liquids, will advance to lite meal

## 2025-06-29 VITALS
RESPIRATION RATE: 20 BRPM | OXYGEN SATURATION: 94 % | WEIGHT: 206.35 LBS | SYSTOLIC BLOOD PRESSURE: 146 MMHG | TEMPERATURE: 98.4 F | HEIGHT: 73 IN | HEART RATE: 64 BPM | BODY MASS INDEX: 27.35 KG/M2 | DIASTOLIC BLOOD PRESSURE: 79 MMHG

## 2025-06-29 PROBLEM — K44.9 HIATAL HERNIA: Status: ACTIVE | Noted: 2025-06-29

## 2025-06-29 LAB
ANISOCYTOSIS BLD QL SMEAR: PRESENT
BASOPHILS # BLD MANUAL: 0 THOUSAND/UL (ref 0–0.1)
BASOPHILS NFR MAR MANUAL: 0 % (ref 0–1)
EOSINOPHIL # BLD MANUAL: 0 THOUSAND/UL (ref 0–0.4)
EOSINOPHIL NFR BLD MANUAL: 0 % (ref 0–6)
ERYTHROCYTE [DISTWIDTH] IN BLOOD BY AUTOMATED COUNT: 13.9 % (ref 11.6–15.1)
HBV SURFACE AB SER-ACNC: 101 MIU/ML
HBV SURFACE AG SER QL: NORMAL
HCT VFR BLD AUTO: 46.6 % (ref 36.5–49.3)
HGB BLD-MCNC: 14.9 G/DL (ref 12–17)
LYMPHOCYTES # BLD AUTO: 0.66 THOUSAND/UL (ref 0.6–4.47)
LYMPHOCYTES # BLD AUTO: 5 % (ref 14–44)
MCH RBC QN AUTO: 24.8 PG (ref 26.8–34.3)
MCHC RBC AUTO-ENTMCNC: 32 G/DL (ref 31.4–37.4)
MCV RBC AUTO: 77 FL (ref 82–98)
MICROCYTES BLD QL AUTO: PRESENT
MONOCYTES # BLD AUTO: 0.13 THOUSAND/UL (ref 0–1.22)
MONOCYTES NFR BLD: 1 % (ref 4–12)
NEUTROPHILS # BLD MANUAL: 12.49 THOUSAND/UL (ref 1.85–7.62)
NEUTS SEG NFR BLD AUTO: 94 % (ref 43–75)
OVALOCYTES BLD QL SMEAR: PRESENT
PLATELET # BLD AUTO: 295 THOUSANDS/UL (ref 149–390)
PLATELET BLD QL SMEAR: ADEQUATE
PMV BLD AUTO: 9.8 FL (ref 8.9–12.7)
POIKILOCYTOSIS BLD QL SMEAR: PRESENT
RBC # BLD AUTO: 6.02 MILLION/UL (ref 3.88–5.62)
RBC MORPH BLD: PRESENT
WBC # BLD AUTO: 13.29 THOUSAND/UL (ref 4.31–10.16)

## 2025-06-29 PROCEDURE — 85007 BL SMEAR W/DIFF WBC COUNT: CPT | Performed by: FAMILY MEDICINE

## 2025-06-29 PROCEDURE — 99238 HOSP IP/OBS DSCHRG MGMT 30/<: CPT | Performed by: FAMILY MEDICINE

## 2025-06-29 PROCEDURE — 85027 COMPLETE CBC AUTOMATED: CPT | Performed by: FAMILY MEDICINE

## 2025-06-29 RX ORDER — PANTOPRAZOLE SODIUM 40 MG/1
40 TABLET, DELAYED RELEASE ORAL
Qty: 60 TABLET | Refills: 0 | Status: SHIPPED | OUTPATIENT
Start: 2025-06-30

## 2025-06-29 RX ORDER — AMLODIPINE BESYLATE 10 MG/1
10 TABLET ORAL DAILY
Qty: 90 TABLET | Refills: 0 | Status: SHIPPED | OUTPATIENT
Start: 2025-06-30

## 2025-06-29 RX ORDER — PREDNISONE 10 MG/1
TABLET ORAL
Qty: 147 TABLET | Refills: 0 | Status: SHIPPED | OUTPATIENT
Start: 2025-06-29 | End: 2025-09-07

## 2025-06-29 RX ADMIN — METHYLPREDNISOLONE SODIUM SUCCINATE 20 MG: 40 INJECTION, POWDER, FOR SOLUTION INTRAMUSCULAR; INTRAVENOUS at 05:52

## 2025-06-29 RX ADMIN — PANTOPRAZOLE SODIUM 40 MG: 40 TABLET, DELAYED RELEASE ORAL at 05:52

## 2025-06-29 RX ADMIN — ENOXAPARIN SODIUM 40 MG: 40 INJECTION SUBCUTANEOUS at 08:25

## 2025-06-29 RX ADMIN — AMLODIPINE BESYLATE 10 MG: 10 TABLET ORAL at 08:25

## 2025-06-29 NOTE — ASSESSMENT & PLAN NOTE
Discharged on pantoprazole 40mg daily, incidental finding on CT scan with some acid reflux symptoms

## 2025-06-29 NOTE — ASSESSMENT & PLAN NOTE
incidental finding on CT scan very concerning finding for renal cell carcinoma. This finding is worse compared to prior scans - grew from 2cm to 4cm  He spoke with inpatient urology consultation, who recommend outpatient robotic urology surgery.   Per urology recommendation, he needs to f[place] Orte[place] to discuss difficult robotic surgical options  Information provided at discharge.

## 2025-06-29 NOTE — CASE MANAGEMENT
Case Management Discharge Planning Note    Patient name Mohit aFrley  Location /427-01 MRN [id number]  : 1975 Date 2025       Current Admission Date: 2025  Current Admission Diagnosis:Crohn's disease of colon with complication (HCC)   Patient Active Problem List    Diagnosis Date Noted    Crohn's disease of colon with complication (HCC) 2023    Essential (primary) hypertension 2023    Osteopenia with high risk of fracture 2023    S/P right hemicolectomy 2021    Right kidney mass 2019    Crohn's disease (HCC) 2012      LOS (days): 1  Geometric Mean LOS (GMLOS) (days):   Days to GMLOS:     OBJECTIVE:  Risk of Unplanned Readmission Score: 10.96         Current admission status: Inpatient   Preferred Pharmacy:   [id number]  [address] [address], [address]  [address] [place] [address], [address] [facility] [address]  Phone: [phone] Fax: [phone]    Primary Care Provider: Maude Cruz PA-C    Primary Insurance: [place] [employer]  Secondary Insurance:     DISCHARGE DETAILS:patient discharging home. No discharge needs noted. Nurse to review AVS, all follow up providers listed.

## 2025-06-29 NOTE — ASSESSMENT & PLAN NOTE
50-year-old male with history of stricturing and penetrating Crohn's disease status post resection and right hemicolectomy x2 (most recently 2012 with IC resection) currently not on any therapy due to lost to follow up - admitted on 6/26 for Crohn's disease with acute exacerbation  Treated with solumdrol IV while admitted  Evaluated by gastroenterology - see detailed note from 6/27/25  obtained pre-biologic workup in hospital  Patient regained bowel function and advanced diet to baseline  At time of discharge, he was sent home with prednisone gradual taper every 2 weeks starting at 40 mg (40 mg, 30 mg, 20 mg, 10 mg, 5 mg)  Recommend close GI followup for maintenance medication, likely need biologics

## 2025-06-29 NOTE — DISCHARGE SUMMARY
Discharge Summary - Hospitalist   Name: Mohit Farley 50 y.o. male I MRN: [id number]  Unit/Bed#: 427-01 I Date of Admission: 6/26/2025   Date of Service: 6/29/2025 I Hospital Day: 1     Assessment & Plan  Crohn's disease of colon with complication (HCC)  S/P right hemicolectomy  50-year-old male with history of stricturing and penetrating Crohn's disease status post resection and right hemicolectomy x2 (most recently 2012 with IC resection) currently not on any therapy due to lost to follow up - admitted on 6/26 for Crohn's disease with acute exacerbation  Treated with solumdrol IV while admitted  Evaluated by gastroenterology - see detailed note from 6/27/25  obtained pre-biologic workup in hospital  Patient regained bowel function and advanced diet to baseline  At time of discharge, he was sent home with prednisone gradual taper every 2 weeks starting at 40 mg (40 mg, 30 mg, 20 mg, 10 mg, 5 mg)  Recommend close GI followup for maintenance medication, likely need biologics  Right kidney mass  incidental finding on CT scan very concerning finding for renal cell carcinoma. This finding is worse compared to prior scans - grew from 2cm to 4cm  He spoke with inpatient urology consultation, who recommend outpatient robotic urology surgery.   Per urology recommendation, he needs to f/u Dr Ortega at [facility] to discuss difficult robotic surgical options  Information provided at discharge.     Essential (primary) hypertension  Discharged on amlodipine 10mg daily  Hiatal hernia  Discharged on pantoprazole 40mg daily, incidental finding on CT scan with some acid reflux symptoms     Medical Problems       Resolved Problems  Date Reviewed: 12/9/2023   None       Discharging Physician / Practitioner: Jenny Giron MD  PCP: Maude Cruz PA-C  Admission Date:   Admission Orders (From admission, onward)       Ordered        06/28/25 1447  INPATIENT ADMISSION  Once            06/26/25 1103  Place in Observation  Once                           Discharge Date: 06/29/25    Next Steps for Physician/AP Assuming Care:  Urology to remove potential kidney cancer  GI clinic for maintenance treatment for crohn's disease  Continue hypertension treatment    Test Results Pending at Discharge (will require follow up):  Stool PCR    Medication Changes for Discharge & Rationale:     See after visit summary for reconciled discharge medications provided to patient and/or family.     Consultations During Hospital Stay:  GI  Urology    Procedures Performed:   None    Significant Findings / Test Results:   IBD history: Crohn's disease  Age at diagnosis: 19 years old with symptoms.  Definitive diagnosis of Crohn's disease in 2011.  Disease location: Transverse colon and rectum, anastomosis site with ulcers at ileum, terminal ileum (prior history of diffuse wall thickening of the small bowel).  Crohn's disease type: Inflammatory and fibrostenotic.  Surgical history: Colon resection secondary to perforation, phlegmon and microperforation distal right colon to hepatic flexure (performed at [facility] in 2011), and ileocolonic resection (right hemicolectomy) in 2012.  Extraintestinal manifestations:  Therapy history: Prednisone, Pentasa, Asacol, sulfasalazine, azathioprine, Humira, Remicade, Entyvio, and most recently Rinvoq.     Colonoscopy in 11/2023 showing normal ileum, end-to-side ileocolonic anastomosis at 40 cm from the anal verge with healthy-appearing mucosa, stricture at 25 cm proximal to the anus, pseudopolyps/granulation tissue at 25 cm, congested erythematous and friable mucosa with contact bleeding from 15 to 30 cm proximal to the anal verge.  Also present in the rectum.  Colon polyp at 25 cm was inflammatory polyp.  Immunostain negative for CMV.  Mildly active chronic colitis with submucosal fibrosis of the colonic stricture at 25 cm.  Mild chronic active colitis negative for dysplasia meters.  Rectal biopsy showing severe chronic colitis,  severely.  Negative for dysplasia.  CT abdomen/pelvis on 6/2025 showed prior right hemicolectomy, postoperative changes involving the left colon, portions of colonic wall appeared thickened, rectal wall appeared thickened with some fat stranding adjacent to this area. Also noted to have moderate sized hiatal hernia. Also found to have 4.5 cm heterogenously enhancing mass involving the medial aspect of the lower pole right kidney consistent with malignant neoplasm.     Incidental Findings:   CT: There is an approximately 4.5 cm heterogeneously enhancing mass involving the medial aspect of the lower pole right kidney and the inferior aspect of the interpolar region right kidney, larger compared with December 5, 2023. The appearance of this mass   is consistent with malignant neoplasm. Urology consultation and follow-up recommended.     There is an approximately 2.2 cm hypodense lesion arising from the medial aspect of the upper pole right kidney which is indeterminate. Follow-up is required. Renal protocol CT or MRI is recommended for further characterization, if there are no contraindications.   I reviewed the above mentioned incidental findings with the patient and/or family and they expressed understanding.    Hospital Course:   50-year-old male with history of stricturing and penetrating Crohn's disease status post resection and right hemicolectomy x2 (most recently 2012 with IC resection) currently not on any therapy - admitted on 6/26 for Crohn's disease with acute exacerbation. Treated with solumdrol IV, evaluated by gastroenterology, obtained pre-biologic workup in hospital. Patient regained bowel function and advanced diet to full. At time of discharge, he was sent home with prednisone gradual taper every 2 weeks starting at 40 mg (40 mg, 30 mg, 20 mg, 10 mg, 5 mg). Recommend close GI followup for maintenance medication. See GI note from 6/27/25 for detailed recommendation.     Secondarily, there was  "incidental finding on CT scan very concerning finding for renal cell carcinoma. This finding is worse compared to prior scans. He spoke with inpatient urology consultation, who recommend outpatient robotic urology surgery. Information provided at discharge.     Please see above list of diagnoses and related plan for additional information.     Discharge Day Visit / Exam:   Subjective:  patient denies abdominal pain. Had stool yesterday, formed and not diarrhea. All questions were answered by GI on Friday. Agree to go home.   Vitals: Blood Pressure: 146/79 (06/29/25 0746)  Pulse: 64 (06/29/25 0746)  Temperature: 98.4 °F (36.9 °C) (06/29/25 0746)  Temp Source: Oral (06/29/25 0746)  Respirations: 20 (06/29/25 0746)  Height: 6' 1\" (185.4 cm) (06/26/25 1149)  Weight - Scale: 93.6 kg (206 lb 5.6 oz) (06/26/25 1149)  SpO2: 94 % (06/29/25 0746)  Physical Exam  Vitals and nursing note reviewed.   Constitutional:       General: He is not in acute distress.     Appearance: He is well-developed.   HENT:      Head: Normocephalic and atraumatic.     Eyes:      Conjunctiva/sclera: Conjunctivae normal.       Cardiovascular:      Rate and Rhythm: Normal rate and regular rhythm.      Heart sounds: No murmur heard.  Pulmonary:      Effort: Pulmonary effort is normal. No respiratory distress.      Breath sounds: Normal breath sounds.   Abdominal:      Palpations: Abdomen is soft.      Tenderness: There is no abdominal tenderness.     Musculoskeletal:         General: No swelling.      Cervical back: Neck supple.     Skin:     General: Skin is warm and dry.      Capillary Refill: Capillary refill takes less than 2 seconds.     Neurological:      Mental Status: He is alert.     Psychiatric:         Mood and Affect: Mood normal.          Discussion with Family: n/a    Discharge instructions/Information to patient and family:   See after visit summary for information provided to patient and family.      Provisions for Follow-Up Care:  See " after visit summary for information related to follow-up care and any pertinent home health orders.      Mobility at time of Discharge:   Basic Mobility Inpatient Raw Score: 24  JH-HLM Goal: 8: Walk 250 feet or more  JH-HLM Achieved: 8: Walk 250 feet ot more  HLM Goal achieved. Continue to encourage appropriate mobility.     Disposition:   Home    Planned Readmission: no    Administrative Statements   Discharge Statement:  I have spent a total time of 60 minutes in caring for this patient on the day of the visit/encounter. .    **Please Note: This note may have been constructed using a voice recognition system**

## 2025-06-29 NOTE — DISCHARGE INSTR - AVS FIRST PAGE
You were admitted for Crohn's disease. prednisone gradual taper every 2 weeks starting at 40 mg (40 mg, 30 mg, 20 mg, 10 mg, 5 mg).  See outpatient GI clinic as soon as possible. You saw Dr. Vadim Burgess in the hospital.     Your blood pressure was very high so we sent you home with amlodipine.     For your hiatal hernia, we sent you home with pantoprazole.     You need to see urology as soon as possible to discuss surgery for your kidney mass, which looks like cancer on the CT scan.   Your case was reviewed by Jani Marroquin MD and Nina Morales PA-C while in the hospital  You need to schedule with Dr Ortega at [facility] to discuss difficult robotic surgical options

## 2025-06-29 NOTE — NURSING NOTE
Patient was discharged in stable condition. IV was taken out and patients belongings were gathered. AVS was reviewed with patient and a verbal understanding was given. Patient was transferred home via family.

## 2025-06-30 ENCOUNTER — TELEPHONE (OUTPATIENT)
Dept: GASTROENTEROLOGY | Facility: CLINIC | Age: 50
End: 2025-06-30

## 2025-06-30 ENCOUNTER — TELEPHONE (OUTPATIENT)
Age: 50
End: 2025-06-30

## 2025-06-30 LAB
C COLI+JEJUNI TUF STL QL NAA+PROBE: NEGATIVE
CALPROTECTIN STL-MCNC: 3830 ÂΜG/G
EC STX1+STX2 GENES STL QL NAA+PROBE: NEGATIVE
SALMONELLA SP SPAO STL QL NAA+PROBE: NEGATIVE
SHIGELLA SP+EIEC IPAH STL QL NAA+PROBE: NEGATIVE

## 2025-06-30 NOTE — TELEPHONE ENCOUNTER
Called and spoke with the patient to assist with scheduling an appointment with urology for his kidney masses.  Patient scheduled 7/3/2025 at 1130 am at the [place] office with Dr Marroquin.  Patient will obtain office address from My Chart.

## 2025-06-30 NOTE — TELEPHONE ENCOUNTER
Holding an appointment 8/28/25 at 3:30 in [place]. Please call us to let us know if you can keep this appointment.

## 2025-06-30 NOTE — TELEPHONE ENCOUNTER
----- Message from Jenn Mercer PA-C sent at 6/30/2025  1:18 PM EDT -----  Pt needs hospital f/u with Dr Burgess for Crohn's disease within next 4-6 weeks.

## 2025-07-01 ENCOUNTER — TELEPHONE (OUTPATIENT)
Age: 50
End: 2025-07-01

## 2025-07-01 NOTE — TELEPHONE ENCOUNTER
----- Message from Vadim Burgess DO sent at 7/1/2025  7:14 AM EDT -----  Regarding: Rinvoq  Good morning,    This is a gentleman that we saw in the hospital for complicated Crohn's disease. Looks like he was on Rinvoq last year but stopped therapy after he was lost to follow-up with [facility].    Would like to get him back on Rinvoq please.    Thank you!    Vadim Burgess D.O.  [facility]  Division of Gastroenterology & Hepatology  Available on [employer] chat  [email or web address]

## 2025-07-03 ENCOUNTER — OFFICE VISIT (OUTPATIENT)
Dept: UROLOGY | Facility: AMBULATORY SURGERY CENTER | Age: 50
End: 2025-07-03
Payer: COMMERCIAL

## 2025-07-03 VITALS
OXYGEN SATURATION: 97 % | HEIGHT: 73 IN | DIASTOLIC BLOOD PRESSURE: 90 MMHG | SYSTOLIC BLOOD PRESSURE: 160 MMHG | BODY MASS INDEX: 27.3 KG/M2 | WEIGHT: 206 LBS | HEART RATE: 97 BPM

## 2025-07-03 DIAGNOSIS — N28.89 RENAL MASS, RIGHT: Primary | ICD-10-CM

## 2025-07-03 PROCEDURE — 99204 OFFICE O/P NEW MOD 45 MIN: CPT | Performed by: UROLOGY

## 2025-07-03 RX ORDER — CEFAZOLIN SODIUM 2 G/50ML
2000 SOLUTION INTRAVENOUS ONCE
OUTPATIENT
Start: 2025-07-03 | End: 2025-07-03

## 2025-07-03 NOTE — TELEPHONE ENCOUNTER
Received a letter from PA [employer] that patient currently has [employer]. [facility]    KEY:[id number]

## 2025-07-03 NOTE — PROGRESS NOTES
Name: Mohit Farley      : 1975      MRN: [id number]  Encounter Provider: Jani Marroquin MD  Encounter Date: 7/3/2025   Encounter department: [facility] UROLOGY [place]  :  Assessment & Plan  Renal mass, right  We had a long discussion today regarding his findings.  He understands that this was not present 2 years ago and is concerned about malignancy.  It appears to be concerning and there are also multifocal lesions on the right kidney.  The primary lesion in lower pole extends up to the renal sinus.  We discussed potential management including nephrectomy and partial nephrectomy.  He is more comfortable with total nephrectomy.  Fortunately renal function is excellent with GFR around 100.    We did discuss considerable risk for him due to his prior findings of intra-abdominal inflammatory change and adhesions.  A previous laparoscopic surgery was converted to open via midline incision.  We discussed techniques, risk, benefits of robotic right radical nephrectomy as well as high likelihood of conversion to open if this is attempted.  The other option is to simply start with open nephrectomy.  After discussion we decided it may be reasonable to attempt laparoscopy.  We reviewed his CT images in detail.  His abdominal wall is very thin and he does have a lot of intra-abdominal fat.  This may be beneficial in terms of placing a laparoscope for evaluation.  He does understand the risk of bowel injury if undergoing another laparoscopic or open surgery in his case.  Although the right colon is no longer present, small bowel has taken its place and is clearly visible on the CT scan.    He does have an appointment to see GI and I have messaged GI as well.  He will be resuming his chronic maintenance medication which will hopefully help.  We will delay attempted surgery for at least 3 months or so in order to allow treatment to take effect.    Orders:  •  Case request operating room: NEPHRECTOMY  "RADICAL LAPAROSCOPIC W/ ROBOTICS; Standing        History of Present Illness   Mohit Farley is a 50 y.o. male who presents for evaluation of renal mass.  Patient has medical history complicated by Crohn's disease with flares.  He has had 2 laparotomies including a right hemicolectomy.  At the time this was supposed to be done laparoscopically however there were intra-abdominal adhesions and required laparotomy via midline incision.    He was to be managed on suppressive therapy for his Crohn's however he has not been totally compliant by his admission.  He was feeling badly and went to the hospital at which time a CT scan revealed finding of a multilobulated lower pole right renal mass measuring 4.5 cm concerning for carcinoma.  There is also question of a secondary mass in the upper pole of the same kidney.      AUA SYMPTOM SCORE      Flowsheet Row Most Recent Value   AUA SYMPTOM SCORE    How often have you had a sensation of not emptying your bladder completely after you finished urinating? 0 (P)     How often have you had to urinate again less than two hours after you finished urinating? 1 (P)     How often have you found you stopped and started again several times when you urinate? 0 (P)     How often have you found it difficult to postpone urination? 0 (P)     How often have you had a weak urinary stream? 0 (P)     How often have you had to push or strain to begin urination? 0 (P)     How many times did you most typically get up to urinate from the time you went to bed at night until the time you got up in the morning? 1 (P)     Quality of Life: If you were to spend the rest of your life with your urinary condition just the way it is now, how would you feel about that? 1 (P)     AUA SYMPTOM SCORE 2 (P)            Review of Systems       Objective   /90 (BP Location: Left arm, Patient Position: Sitting, Cuff Size: Adult)   Pulse 97   Ht 6' 1\" (1.854 m)   Wt 93.4 kg (206 lb)   SpO2 97%   BMI 27.18 " "kg/m²     Physical Exam  Vitals reviewed.   Constitutional:       Appearance: He is well-developed.   HENT:      Head: Normocephalic and atraumatic.     Eyes:      Conjunctiva/sclera: Conjunctivae normal.       Cardiovascular:      Rate and Rhythm: Normal rate.   Pulmonary:      Effort: Pulmonary effort is normal.   Abdominal:      Palpations: Abdomen is soft.      Comments: Midline scar   Genitourinary:     Comments: No CVAT    Musculoskeletal:         General: Normal range of motion.     Skin:     General: Skin is warm and dry.     Neurological:      Mental Status: He is alert and oriented to person, place, and time.     Psychiatric:         Mood and Affect: Mood normal.           Results   No results found for: \"PSA\"  Lab Results   Component Value Date    CALCIUM 8.7 06/28/2025    K 4.0 06/28/2025    CO2 24 06/28/2025     06/28/2025    BUN 14 06/28/2025    CREATININE 0.82 06/28/2025     Lab Results   Component Value Date    WBC 13.29 (H) 06/29/2025    HGB 14.9 06/29/2025    HCT 46.6 06/29/2025    MCV 77 (L) 06/29/2025     06/29/2025       Office Urine Dip  No results found for this or any previous visit (from the past hour).        "

## 2025-07-03 NOTE — LETTER
July 3, 2025     Maude Cruz PA-C  [address]  [facility] [address]    Patient: Mohit Farley   YOB: 1975   Date of Visit: 7/3/2025       Dear ADITYA Mcdonald DO:    Thank you for referring Mohit Farley to me for evaluation. Below are my notes for this consultation.    If you have questions, please do not hesitate to call me. I look forward to following your patient along with you.         Sincerely,        Jani Marroquin MD        CC: DO Jani Burnett MD  7/3/2025  2:18 PM  Sign when Signing Visit  Name: Mohit Farley      : 1975      MRN: [id number]  Encounter Provider: Jani Marroquin MD  Encounter Date: 7/3/2025   Encounter department: [facility] UROLOGY [place]  :  Assessment & Plan  Renal mass, right  We had a long discussion today regarding his findings.  He understands that this was not present 2 years ago and is concerned about malignancy.  It appears to be concerning and there are also multifocal lesions on the right kidney.  The primary lesion in lower pole extends up to the renal sinus.  We discussed potential management including nephrectomy and partial nephrectomy.  He is more comfortable with total nephrectomy.  Fortunately renal function is excellent with GFR around 100.    We did discuss considerable risk for him due to his prior findings of intra-abdominal inflammatory change and adhesions.  A previous laparoscopic surgery was converted to open via midline incision.  We discussed techniques, risk, benefits of robotic right radical nephrectomy as well as high likelihood of conversion to open if this is attempted.  The other option is to simply start with open nephrectomy.  After discussion we decided it may be reasonable to attempt laparoscopy.  We reviewed his CT images in detail.  His abdominal wall is very thin and he does have a lot of intra-abdominal fat.  This may be beneficial in  terms of placing a laparoscope for evaluation.  He does understand the risk of bowel injury if undergoing another laparoscopic or open surgery in his case.  Although the right colon is no longer present, small bowel has taken its place and is clearly visible on the CT scan.    He does have an appointment to see GI and I have messaged GI as well.  He will be resuming his chronic maintenance medication which will hopefully help.  We will delay attempted surgery for at least 3 months or so in order to allow treatment to take effect.    Orders:  •  Case request operating room: NEPHRECTOMY RADICAL LAPAROSCOPIC W/ ROBOTICS; Standing        History of Present Illness  Mohit Farley is a 50 y.o. male who presents for evaluation of renal mass.  Patient has medical history complicated by Crohn's disease with flares.  He has had 2 laparotomies including a right hemicolectomy.  At the time this was supposed to be done laparoscopically however there were intra-abdominal adhesions and required laparotomy via midline incision.    He was to be managed on suppressive therapy for his Crohn's however he has not been totally compliant by his admission.  He was feeling badly and went to the hospital at which time a CT scan revealed finding of a multilobulated lower pole right renal mass measuring 4.5 cm concerning for carcinoma.  There is also question of a secondary mass in the upper pole of the same kidney.      AUA SYMPTOM SCORE      Flowsheet Row Most Recent Value   AUA SYMPTOM SCORE    How often have you had a sensation of not emptying your bladder completely after you finished urinating? 0 (P)     How often have you had to urinate again less than two hours after you finished urinating? 1 (P)     How often have you found you stopped and started again several times when you urinate? 0 (P)     How often have you found it difficult to postpone urination? 0 (P)     How often have you had a weak urinary stream? 0 (P)     How often have  "you had to push or strain to begin urination? 0 (P)     How many times did you most typically get up to urinate from the time you went to bed at night until the time you got up in the morning? 1 (P)     Quality of Life: If you were to spend the rest of your life with your urinary condition just the way it is now, how would you feel about that? 1 (P)     AUA SYMPTOM SCORE 2 (P)            Review of Systems       Objective  /90 (BP Location: Left arm, Patient Position: Sitting, Cuff Size: Adult)   Pulse 97   Ht 6' 1\" (1.854 m)   Wt 93.4 kg (206 lb)   SpO2 97%   BMI 27.18 kg/m²     Physical Exam  Vitals reviewed.   Constitutional:       Appearance: He is well-developed.   HENT:      Head: Normocephalic and atraumatic.     Eyes:      Conjunctiva/sclera: Conjunctivae normal.       Cardiovascular:      Rate and Rhythm: Normal rate.   Pulmonary:      Effort: Pulmonary effort is normal.   Abdominal:      Palpations: Abdomen is soft.      Comments: Midline scar   Genitourinary:     Comments: No CVAT    Musculoskeletal:         General: Normal range of motion.     Skin:     General: Skin is warm and dry.     Neurological:      Mental Status: He is alert and oriented to person, place, and time.     Psychiatric:         Mood and Affect: Mood normal.           Results   No results found for: \"PSA\"  Lab Results   Component Value Date    CALCIUM 8.7 06/28/2025    K 4.0 06/28/2025    CO2 24 06/28/2025     06/28/2025    BUN 14 06/28/2025    CREATININE 0.82 06/28/2025     Lab Results   Component Value Date    WBC 13.29 (H) 06/29/2025    HGB 14.9 06/29/2025    HCT 46.6 06/29/2025    MCV 77 (L) 06/29/2025     06/29/2025       Office Urine Dip  No results found for this or any previous visit (from the past hour).          "

## 2025-07-10 NOTE — TELEPHONE ENCOUNTER
How Severe Are Your Spot(S)?: moderate Pt missed his appt today due to feeling sick. He attempted to reschedule online but wasn't able to. Can you assist with another follow up visit?   Have Your Spot(S) Been Treated In The Past?: has not been treated Hpi Title: Evaluation of Skin Lesions

## 2025-07-10 NOTE — TELEPHONE ENCOUNTER
I gave Ghulam the very next available appointment and made sure it was on the call list, which worked for him last time, so he was fine with that.

## 2025-07-15 ENCOUNTER — PREP FOR PROCEDURE (OUTPATIENT)
Dept: UROLOGY | Facility: AMBULATORY SURGERY CENTER | Age: 50
End: 2025-07-15

## 2025-07-15 ENCOUNTER — TELEPHONE (OUTPATIENT)
Dept: UROLOGY | Facility: AMBULATORY SURGERY CENTER | Age: 50
End: 2025-07-15

## 2025-07-15 DIAGNOSIS — Z01.818 PREOP EXAMINATION: ICD-10-CM

## 2025-07-15 DIAGNOSIS — N28.89 RENAL MASS, RIGHT: Primary | ICD-10-CM

## 2025-07-15 DIAGNOSIS — Z01.810 PRE-OPERATIVE CARDIOVASCULAR EXAMINATION: ICD-10-CM

## 2025-07-15 DIAGNOSIS — Z79.01 LONG TERM (CURRENT) USE OF ANTICOAGULANTS: ICD-10-CM

## 2025-07-15 DIAGNOSIS — R39.89 SUSPECTED UTI: ICD-10-CM

## 2025-07-15 DIAGNOSIS — Z01.812 PRE-OPERATIVE LABORATORY EXAMINATION: ICD-10-CM

## 2025-07-15 NOTE — TELEPHONE ENCOUNTER
Spoke with patient and confirmed surgery date of: 10/27/2025  Type of surgery: Robotic R. Radical Nephrectomy  Operating physician: Dr. Marroquin  Location of surgery: [facility]    Verbally went over prep with patient on: 7/15/2025  NPO  Bowel prep? Yes  Hospital calls afternoon prior with arrival time -Calls Friday afternoon for Monday surgeries  Patient needs ride to and from surgery Surgery Admit   Pre-op testing to be done 2 weeks prior to surgery  CBC, CMP, PT/INR, PTT, Type and Screen, Urine Culture and EKG  Blood thinners:   NONE  Clearances needed: Medical    Mailed to patient on: 7/22/2025  Copy of packet scanned into Media on: 7/22/2025  Labs in packet  Soap / Bowel prep in packet  Pre-op & Post-op in packet  Dates of H&P and post-op if needed    Consent: in Media   If needed:    Medical/Cardiac Clearance: Medical  Appt with:Fidelia Azar  Appt date and time:  Date clearance form faxed:  Best fax number: [phone]  I called PCP office to schedule medical clearance appt and was informed that pt has not been seen by their office in over 3 years. Pt needed to have a new patient appt prior to being able to schedule his medical clearance appt. Pt is scheduled for a new patient appt on 7/29/25 at 12:40PM. Pt is aware of this appt and that his medical clearance will be scheduled that day.     RBC blood bank done on: 7/15/2025

## 2025-07-22 DIAGNOSIS — K50.119 CROHN'S DISEASE OF COLON WITH COMPLICATION (HCC): Primary | ICD-10-CM

## 2025-07-22 RX ORDER — DIPHENHYDRAMINE HCL 25 MG
25 TABLET ORAL ONCE
OUTPATIENT
Start: 2025-08-06

## 2025-07-22 RX ORDER — METHYLPREDNISOLONE SODIUM SUCCINATE 40 MG/ML
40 INJECTION, POWDER, LYOPHILIZED, FOR SOLUTION INTRAMUSCULAR; INTRAVENOUS ONCE
OUTPATIENT
Start: 2025-08-06

## 2025-07-22 RX ORDER — ACETAMINOPHEN 325 MG/1
650 TABLET ORAL ONCE
OUTPATIENT
Start: 2025-08-06

## 2025-07-22 RX ORDER — DEXTROSE MONOHYDRATE 50 MG/ML
20 INJECTION, SOLUTION INTRAVENOUS ONCE
OUTPATIENT
Start: 2025-08-06

## 2025-08-04 ENCOUNTER — TELEPHONE (OUTPATIENT)
Dept: INFUSION CENTER | Facility: HOSPITAL | Age: 50
End: 2025-08-04

## 2025-08-06 ENCOUNTER — HOSPITAL ENCOUNTER (OUTPATIENT)
Dept: INFUSION CENTER | Facility: HOSPITAL | Age: 50
Discharge: HOME | End: 2025-08-06
Attending: STUDENT IN AN ORGANIZED HEALTH CARE EDUCATION/TRAINING PROGRAM
Payer: COMMERCIAL